# Patient Record
Sex: FEMALE | Race: WHITE | NOT HISPANIC OR LATINO | Employment: OTHER | URBAN - METROPOLITAN AREA
[De-identification: names, ages, dates, MRNs, and addresses within clinical notes are randomized per-mention and may not be internally consistent; named-entity substitution may affect disease eponyms.]

---

## 2018-02-21 ENCOUNTER — OFFICE VISIT CONVERTED (OUTPATIENT)
Dept: FAMILY MEDICINE CLINIC | Facility: CLINIC | Age: 68
End: 2018-02-21
Attending: NURSE PRACTITIONER

## 2018-03-07 ENCOUNTER — CONVERSION ENCOUNTER (OUTPATIENT)
Dept: MAMMOGRAPHY | Facility: HOSPITAL | Age: 68
End: 2018-03-07

## 2018-05-11 ENCOUNTER — OFFICE VISIT CONVERTED (OUTPATIENT)
Dept: FAMILY MEDICINE CLINIC | Facility: CLINIC | Age: 68
End: 2018-05-11
Attending: NURSE PRACTITIONER

## 2018-08-10 ENCOUNTER — OFFICE VISIT CONVERTED (OUTPATIENT)
Dept: FAMILY MEDICINE CLINIC | Facility: CLINIC | Age: 68
End: 2018-08-10
Attending: NURSE PRACTITIONER

## 2018-08-10 ENCOUNTER — CONVERSION ENCOUNTER (OUTPATIENT)
Dept: FAMILY MEDICINE CLINIC | Facility: CLINIC | Age: 68
End: 2018-08-10

## 2018-08-17 ENCOUNTER — OFFICE VISIT CONVERTED (OUTPATIENT)
Dept: SURGERY | Facility: CLINIC | Age: 68
End: 2018-08-17
Attending: SURGERY

## 2018-08-22 ENCOUNTER — OFFICE VISIT CONVERTED (OUTPATIENT)
Dept: FAMILY MEDICINE CLINIC | Facility: CLINIC | Age: 68
End: 2018-08-22
Attending: NURSE PRACTITIONER

## 2018-10-09 ENCOUNTER — OFFICE VISIT CONVERTED (OUTPATIENT)
Dept: SURGERY | Facility: CLINIC | Age: 68
End: 2018-10-09
Attending: SURGERY

## 2018-11-13 ENCOUNTER — OFFICE VISIT CONVERTED (OUTPATIENT)
Dept: FAMILY MEDICINE CLINIC | Facility: CLINIC | Age: 68
End: 2018-11-13
Attending: NURSE PRACTITIONER

## 2018-11-27 ENCOUNTER — OFFICE VISIT CONVERTED (OUTPATIENT)
Dept: FAMILY MEDICINE CLINIC | Facility: CLINIC | Age: 68
End: 2018-11-27
Attending: NURSE PRACTITIONER

## 2018-11-30 ENCOUNTER — OFFICE VISIT CONVERTED (OUTPATIENT)
Dept: SURGERY | Facility: CLINIC | Age: 68
End: 2018-11-30
Attending: SURGERY

## 2019-01-17 ENCOUNTER — OFFICE VISIT CONVERTED (OUTPATIENT)
Dept: FAMILY MEDICINE CLINIC | Facility: CLINIC | Age: 69
End: 2019-01-17
Attending: NURSE PRACTITIONER

## 2019-01-17 ENCOUNTER — HOSPITAL ENCOUNTER (OUTPATIENT)
Dept: FAMILY MEDICINE CLINIC | Facility: CLINIC | Age: 69
Discharge: HOME | End: 2019-01-17
Attending: NURSE PRACTITIONER

## 2019-01-17 LAB
ALBUMIN SERPL-MCNC: 4.7 G/DL (ref 3.5–5)
ALBUMIN/GLOB SERPL: 1.6 {RATIO} (ref 1.4–2.6)
ALP SERPL-CCNC: 116 U/L (ref 43–160)
ALT SERPL-CCNC: 43 U/L (ref 10–40)
ANION GAP SERPL CALC-SCNC: 26 MMOL/L (ref 8–19)
APPEARANCE UR: CLEAR
AST SERPL-CCNC: 52 U/L (ref 15–50)
BASOPHILS # BLD AUTO: 0.09 10*3/UL (ref 0–0.2)
BASOPHILS NFR BLD AUTO: 0.75 % (ref 0–3)
BILIRUB SERPL-MCNC: 0.47 MG/DL (ref 0.2–1.3)
BILIRUB UR QL: NEGATIVE
BUN SERPL-MCNC: 21 MG/DL (ref 5–25)
BUN/CREAT SERPL: 19 {RATIO} (ref 6–20)
CALCIUM SERPL-MCNC: 9.8 MG/DL (ref 8.7–10.4)
CHLORIDE SERPL-SCNC: 101 MMOL/L (ref 99–111)
CHOLEST SERPL-MCNC: 162 MG/DL (ref 107–200)
CHOLEST/HDLC SERPL: 4.1 {RATIO} (ref 3–6)
COLOR UR: YELLOW
CONV CO2: 20 MMOL/L (ref 22–32)
CONV COLLECTION SOURCE (UA): NORMAL
CONV TOTAL PROTEIN: 7.7 G/DL (ref 6.3–8.2)
CONV UROBILINOGEN IN URINE BY AUTOMATED TEST STRIP: 0.2 {EHRLICHU}/DL (ref 0.1–1)
CREAT UR-MCNC: 1.08 MG/DL (ref 0.5–0.9)
EOSINOPHIL # BLD AUTO: 0.44 10*3/UL (ref 0–0.7)
EOSINOPHIL # BLD AUTO: 3.65 % (ref 0–7)
ERYTHROCYTE [DISTWIDTH] IN BLOOD BY AUTOMATED COUNT: 13.2 % (ref 11.5–14.5)
EST. AVERAGE GLUCOSE BLD GHB EST-MCNC: 154 MG/DL
GFR SERPLBLD BASED ON 1.73 SQ M-ARVRAT: 52 ML/MIN/{1.73_M2}
GLOBULIN UR ELPH-MCNC: 3 G/DL (ref 2–3.5)
GLUCOSE SERPL-MCNC: 158 MG/DL (ref 65–99)
GLUCOSE UR QL: NEGATIVE MG/DL
HBA1C MFR BLD: 12.7 G/DL (ref 12–16)
HBA1C MFR BLD: 7 % (ref 3.5–5.7)
HCT VFR BLD AUTO: 36.6 % (ref 37–47)
HDLC SERPL-MCNC: 40 MG/DL (ref 40–60)
HGB UR QL STRIP: NEGATIVE
KETONES UR QL STRIP: NEGATIVE MG/DL
LDLC SERPL CALC-MCNC: 75 MG/DL (ref 70–100)
LEUKOCYTE ESTERASE UR QL STRIP: NEGATIVE
LYMPHOCYTES # BLD AUTO: 2.66 10*3/UL (ref 1–5)
MCH RBC QN AUTO: 29.6 PG (ref 27–31)
MCHC RBC AUTO-ENTMCNC: 34.8 G/DL (ref 33–37)
MCV RBC AUTO: 85.1 FL (ref 81–99)
MONOCYTES # BLD AUTO: 0.62 10*3/UL (ref 0.2–1.2)
MONOCYTES NFR BLD AUTO: 5.2 % (ref 3–10)
NEUTROPHILS # BLD AUTO: 8.14 10*3/UL (ref 2–8)
NEUTROPHILS NFR BLD AUTO: 68.1 % (ref 30–85)
NITRITE UR QL STRIP: NEGATIVE
NRBC BLD AUTO-RTO: 0 % (ref 0–0.01)
OSMOLALITY SERPL CALC.SUM OF ELEC: 300 MOSM/KG (ref 273–304)
PH UR STRIP.AUTO: 6 [PH] (ref 5–8)
PLATELET # BLD AUTO: 326 10*3/UL (ref 130–400)
PMV BLD AUTO: 7.2 FL (ref 7.4–10.4)
POTASSIUM SERPL-SCNC: 4.8 MMOL/L (ref 3.5–5.3)
PROT UR QL: NEGATIVE MG/DL
RBC # BLD AUTO: 4.3 10*6/UL (ref 4.2–5.4)
SODIUM SERPL-SCNC: 142 MMOL/L (ref 135–147)
SP GR UR: 1.01 (ref 1–1.03)
TRIGL SERPL-MCNC: 234 MG/DL (ref 40–150)
VARIANT LYMPHS NFR BLD MANUAL: 22.3 % (ref 20–45)
VLDLC SERPL-MCNC: 47 MG/DL (ref 5–37)
WBC # BLD AUTO: 11.9 10*3/UL (ref 4.8–10.8)

## 2019-07-25 ENCOUNTER — HOSPITAL ENCOUNTER (OUTPATIENT)
Dept: GENERAL RADIOLOGY | Facility: HOSPITAL | Age: 69
Discharge: HOME | End: 2019-07-25
Attending: PHYSICIAN ASSISTANT

## 2020-01-13 ENCOUNTER — HOSPITAL ENCOUNTER (OUTPATIENT)
Dept: ULTRASOUND IMAGING | Facility: HOSPITAL | Age: 70
Discharge: HOME | End: 2020-01-13
Attending: PHYSICIAN ASSISTANT

## 2020-03-10 ENCOUNTER — OFFICE VISIT CONVERTED (OUTPATIENT)
Dept: ONCOLOGY | Facility: HOSPITAL | Age: 70
End: 2020-03-10
Attending: INTERNAL MEDICINE

## 2020-03-10 ENCOUNTER — HOSPITAL ENCOUNTER (OUTPATIENT)
Dept: ONCOLOGY | Facility: HOSPITAL | Age: 70
Discharge: HOME | End: 2020-03-10
Attending: INTERNAL MEDICINE

## 2020-04-02 ENCOUNTER — OFFICE VISIT CONVERTED (OUTPATIENT)
Dept: ONCOLOGY | Facility: HOSPITAL | Age: 70
End: 2020-04-02
Attending: INTERNAL MEDICINE

## 2020-04-02 ENCOUNTER — HOSPITAL ENCOUNTER (OUTPATIENT)
Dept: ONCOLOGY | Facility: HOSPITAL | Age: 70
Discharge: HOME | End: 2020-04-02
Attending: INTERNAL MEDICINE

## 2020-04-02 LAB
ALBUMIN SERPL-MCNC: 3.9 G/DL (ref 3.5–5)
ALBUMIN/GLOB SERPL: 1.1 {RATIO} (ref 1.4–2.6)
ALP SERPL-CCNC: 134 U/L (ref 43–160)
ALT SERPL-CCNC: 22 U/L (ref 10–40)
AMYLASE SERPL-CCNC: 60 U/L (ref 30–150)
ANION GAP SERPL CALC-SCNC: 15 MMOL/L (ref 8–19)
AST SERPL-CCNC: 27 U/L (ref 15–50)
BASOPHILS # BLD AUTO: 0.05 10*3/UL (ref 0–0.2)
BASOPHILS NFR BLD AUTO: 0.6 % (ref 0–3)
BILIRUB SERPL-MCNC: 0.36 MG/DL (ref 0.2–1.3)
BUN SERPL-MCNC: 21 MG/DL (ref 5–25)
BUN/CREAT SERPL: 34 {RATIO} (ref 6–20)
CALCIUM SERPL-MCNC: 9.9 MG/DL (ref 8.7–10.4)
CALCIUM SPEC-SCNC: 9.8 MG/DL (ref 8.7–10.4)
CHLORIDE SERPL-SCNC: 97 MMOL/L (ref 99–111)
CONV ABS IMM GRAN: 0.02 10*3/UL (ref 0–0.54)
CONV CO2: 23 MMOL/L (ref 22–32)
CONV EOSINOPHILS PERCENT BY MANUAL COUNT: 2.5 % (ref 0–7)
CONV IMMATURE GRAN: 0.2 % (ref 0–0.4)
CONV TOTAL PROTEIN: 7.4 G/DL (ref 6.3–8.2)
CREAT UR-MCNC: 0.61 MG/DL (ref 0.5–0.9)
EOSINOPHIL # BLD MANUAL: 0.21 10*3/UL (ref 0–0.7)
ERYTHROCYTE [DISTWIDTH] IN BLOOD BY AUTOMATED COUNT: 15.5 % (ref 11.5–14.5)
ERYTHROCYTE [DISTWIDTH] IN BLOOD BY AUTOMATED COUNT: 46.5 FL
GFR SERPLBLD BASED ON 1.73 SQ M-ARVRAT: >60 ML/MIN/{1.73_M2}
GLOBULIN UR ELPH-MCNC: 3.5 G/DL (ref 2–3.5)
GLUCOSE SERPL-MCNC: 244 MG/DL (ref 65–99)
HBA1C MFR BLD: 10 G/DL (ref 12–16)
HCT VFR BLD AUTO: 31.7 % (ref 37–47)
LIPASE SERPL-CCNC: 190 U/L (ref 5–51)
LYMPHOCYTES # BLD AUTO: 1.52 10*3/UL (ref 1–5)
LYMPHOCYTES NFR BLD AUTO: 17.9 % (ref 20–45)
MCH RBC QN AUTO: 25.4 PG (ref 27–31)
MCHC RBC AUTO-ENTMCNC: 31.5 G/DL (ref 33–37)
MCV RBC AUTO: 80.7 FL (ref 81–99)
MONOCYTES # BLD AUTO: 0.45 10*3/UL (ref 0.2–1.2)
MONOCYTES NFR BLD MANUAL: 5.3 % (ref 3–10)
NEUTROPHILS # BLD AUTO: 6.26 10*3/UL (ref 2–8)
NEUTROPHILS NFR BLD MANUAL: 73.5 % (ref 30–85)
OSMOLALITY SERPL CALC.SUM OF ELEC: 283 MOSM/KG (ref 273–304)
PLATELET # BLD AUTO: 341 10*3/UL (ref 130–400)
PMV BLD AUTO: 9.3 FL (ref 7.4–10.4)
POTASSIUM SERPL-SCNC: 4.1 MMOL/L (ref 3.5–5.3)
PREALB SERPL-MCNC: 24.3 MG/DL (ref 20–40)
RBC MORPH BLD: 3.93 10*6/UL (ref 4.2–5.4)
SODIUM SERPL-SCNC: 131 MMOL/L (ref 135–147)
WBC # BLD AUTO: 8.51 10*3/UL (ref 4.8–10.8)

## 2020-05-14 ENCOUNTER — OFFICE VISIT CONVERTED (OUTPATIENT)
Dept: ONCOLOGY | Facility: HOSPITAL | Age: 70
End: 2020-05-14
Attending: INTERNAL MEDICINE

## 2020-05-14 ENCOUNTER — HOSPITAL ENCOUNTER (OUTPATIENT)
Dept: OTHER | Facility: HOSPITAL | Age: 70
Discharge: HOME | End: 2020-05-14
Attending: INTERNAL MEDICINE

## 2020-05-14 LAB
ALBUMIN SERPL-MCNC: 4 G/DL (ref 3.5–5)
ALBUMIN/GLOB SERPL: 1.3 {RATIO} (ref 1.4–2.6)
ALP SERPL-CCNC: 114 U/L (ref 43–160)
ALT SERPL-CCNC: 18 U/L (ref 10–40)
ANION GAP SERPL CALC-SCNC: 18 MMOL/L (ref 8–19)
AST SERPL-CCNC: 19 U/L (ref 15–50)
BASOPHILS # BLD AUTO: 0.04 10*3/UL (ref 0–0.2)
BASOPHILS NFR BLD AUTO: 0.4 % (ref 0–3)
BILIRUB SERPL-MCNC: 0.23 MG/DL (ref 0.2–1.3)
BUN SERPL-MCNC: 11 MG/DL (ref 5–25)
BUN/CREAT SERPL: 21 {RATIO} (ref 6–20)
CALCIUM SERPL-MCNC: 9.1 MG/DL (ref 8.7–10.4)
CHLORIDE SERPL-SCNC: 94 MMOL/L (ref 99–111)
CONV ABS IMM GRAN: 0.04 10*3/UL (ref 0–0.2)
CONV CO2: 23 MMOL/L (ref 22–32)
CONV IMMATURE GRAN: 0.4 % (ref 0–1.8)
CONV TOTAL PROTEIN: 7.2 G/DL (ref 6.3–8.2)
CREAT UR-MCNC: 0.53 MG/DL (ref 0.5–0.9)
DEPRECATED RDW RBC AUTO: 42.6 FL (ref 36.4–46.3)
EOSINOPHIL # BLD AUTO: 0.18 10*3/UL (ref 0–0.7)
EOSINOPHIL # BLD AUTO: 1.8 % (ref 0–7)
ERYTHROCYTE [DISTWIDTH] IN BLOOD BY AUTOMATED COUNT: 14.9 % (ref 11.7–14.4)
GFR SERPLBLD BASED ON 1.73 SQ M-ARVRAT: >60 ML/MIN/{1.73_M2}
GLOBULIN UR ELPH-MCNC: 3.2 G/DL (ref 2–3.5)
GLUCOSE SERPL-MCNC: 188 MG/DL (ref 65–99)
HCT VFR BLD AUTO: 29.4 % (ref 37–47)
HGB BLD-MCNC: 9.2 G/DL (ref 12–16)
LYMPHOCYTES # BLD AUTO: 1.43 10*3/UL (ref 1–5)
LYMPHOCYTES NFR BLD AUTO: 14.4 % (ref 20–45)
MAGNESIUM SERPL-MCNC: 1.68 MG/DL (ref 1.6–2.3)
MCH RBC QN AUTO: 24.7 PG (ref 27–31)
MCHC RBC AUTO-ENTMCNC: 31.3 G/DL (ref 33–37)
MCV RBC AUTO: 78.8 FL (ref 81–99)
MONOCYTES # BLD AUTO: 0.54 10*3/UL (ref 0.2–1.2)
MONOCYTES NFR BLD AUTO: 5.5 % (ref 3–10)
NEUTROPHILS # BLD AUTO: 7.67 10*3/UL (ref 2–8)
NEUTROPHILS NFR BLD AUTO: 77.5 % (ref 30–85)
NRBC CBCN: 0 % (ref 0–0.7)
OSMOLALITY SERPL CALC.SUM OF ELEC: 278 MOSM/KG (ref 273–304)
PHOSPHATE SERPL-MCNC: 3.4 MG/DL (ref 2.4–4.5)
PLATELET # BLD AUTO: 241 10*3/UL (ref 130–400)
PMV BLD AUTO: 10.3 FL (ref 9.4–12.3)
POTASSIUM SERPL-SCNC: 3.1 MMOL/L (ref 3.5–5.3)
RBC # BLD AUTO: 3.73 10*6/UL (ref 4.2–5.4)
SODIUM SERPL-SCNC: 132 MMOL/L (ref 135–147)
WBC # BLD AUTO: 9.9 10*3/UL (ref 4.8–10.8)

## 2020-05-21 ENCOUNTER — OFFICE VISIT CONVERTED (OUTPATIENT)
Dept: ONCOLOGY | Facility: HOSPITAL | Age: 70
End: 2020-05-21
Attending: NURSE PRACTITIONER

## 2020-05-22 ENCOUNTER — OFFICE VISIT CONVERTED (OUTPATIENT)
Dept: ONCOLOGY | Facility: HOSPITAL | Age: 70
End: 2020-05-22
Attending: INTERNAL MEDICINE

## 2020-05-22 ENCOUNTER — HOSPITAL ENCOUNTER (OUTPATIENT)
Dept: OTHER | Facility: HOSPITAL | Age: 70
Setting detail: RECURRING SERIES
Discharge: HOME | End: 2020-08-20
Attending: INTERNAL MEDICINE

## 2020-05-22 LAB
ALBUMIN SERPL-MCNC: 4 G/DL (ref 3.5–5)
ALBUMIN/GLOB SERPL: 1.3 {RATIO} (ref 1.4–2.6)
ALP SERPL-CCNC: 124 U/L (ref 43–160)
ALT SERPL-CCNC: 25 U/L (ref 10–40)
ANION GAP SERPL CALC-SCNC: 16 MMOL/L (ref 8–19)
AST SERPL-CCNC: 38 U/L (ref 15–50)
BASOPHILS # BLD AUTO: 0.01 10*3/UL (ref 0–0.2)
BASOPHILS NFR BLD AUTO: 0.1 % (ref 0–3)
BILIRUB SERPL-MCNC: 0.27 MG/DL (ref 0.2–1.3)
BUN SERPL-MCNC: 13 MG/DL (ref 5–25)
BUN/CREAT SERPL: 20 {RATIO} (ref 6–20)
CALCIUM SERPL-MCNC: 9.3 MG/DL (ref 8.7–10.4)
CALCIUM SPEC-SCNC: 9.3 MG/DL (ref 8.7–10.4)
CHLORIDE SERPL-SCNC: 93 MMOL/L (ref 99–111)
CONV ABS IMM GRAN: 0.01 10*3/UL (ref 0–0.54)
CONV CO2: 24 MMOL/L (ref 22–32)
CONV EOSINOPHILS PERCENT BY MANUAL COUNT: 1.8 % (ref 0–7)
CONV IMMATURE GRAN: 0.1 % (ref 0–0.4)
CONV TOTAL PROTEIN: 7 G/DL (ref 6.3–8.2)
CREAT UR-MCNC: 0.64 MG/DL (ref 0.5–0.9)
EOSINOPHIL # BLD MANUAL: 0.14 10*3/UL (ref 0–0.7)
ERYTHROCYTE [DISTWIDTH] IN BLOOD BY AUTOMATED COUNT: 14.3 % (ref 11.5–14.5)
ERYTHROCYTE [DISTWIDTH] IN BLOOD BY AUTOMATED COUNT: 40.4 FL
FERRITIN SERPL-MCNC: 23 NG/ML (ref 10–200)
GFR SERPLBLD BASED ON 1.73 SQ M-ARVRAT: >60 ML/MIN/{1.73_M2}
GLOBULIN UR ELPH-MCNC: 3 G/DL (ref 2–3.5)
GLUCOSE SERPL-MCNC: 248 MG/DL (ref 65–99)
HBA1C MFR BLD: 9.3 G/DL (ref 12–16)
HCT VFR BLD AUTO: 28.7 % (ref 37–47)
IRON SATN MFR SERPL: 5 % (ref 20–55)
IRON SERPL-MCNC: 27 UG/DL (ref 60–170)
LYMPHOCYTES # BLD AUTO: 0.95 10*3/UL (ref 1–5)
LYMPHOCYTES NFR BLD AUTO: 12.3 % (ref 20–45)
MCH RBC QN AUTO: 24.9 PG (ref 27–31)
MCHC RBC AUTO-ENTMCNC: 32.4 G/DL (ref 33–37)
MCV RBC AUTO: 76.9 FL (ref 81–99)
MONOCYTES # BLD AUTO: 0.48 10*3/UL (ref 0.2–1.2)
MONOCYTES NFR BLD MANUAL: 6.2 % (ref 3–10)
NEUTROPHILS # BLD AUTO: 6.14 10*3/UL (ref 2–8)
NEUTROPHILS NFR BLD MANUAL: 79.5 % (ref 30–85)
OSMOLALITY SERPL CALC.SUM OF ELEC: 276 MOSM/KG (ref 273–304)
PLATELET # BLD AUTO: 233 10*3/UL (ref 130–400)
PMV BLD AUTO: 9.4 FL (ref 7.4–10.4)
POTASSIUM SERPL-SCNC: 3.6 MMOL/L (ref 3.5–5.3)
RBC MORPH BLD: 3.73 10*6/UL (ref 4.2–5.4)
SODIUM SERPL-SCNC: 129 MMOL/L (ref 135–147)
TIBC SERPL-MCNC: 502 UG/DL (ref 245–450)
TRANSFERRIN SERPL-MCNC: 351 MG/DL (ref 250–380)
WBC # BLD AUTO: 7.73 10*3/UL (ref 4.8–10.8)

## 2020-05-29 ENCOUNTER — OFFICE VISIT CONVERTED (OUTPATIENT)
Dept: ONCOLOGY | Facility: HOSPITAL | Age: 70
End: 2020-05-29
Attending: INTERNAL MEDICINE

## 2020-05-29 LAB
ALBUMIN SERPL-MCNC: 4 G/DL (ref 3.5–5)
ALBUMIN/GLOB SERPL: 1.3 {RATIO} (ref 1.4–2.6)
ALP SERPL-CCNC: 109 U/L (ref 43–160)
ALT SERPL-CCNC: 27 U/L (ref 10–40)
ANION GAP SERPL CALC-SCNC: 15 MMOL/L (ref 8–19)
AST SERPL-CCNC: 26 U/L (ref 15–50)
BASOPHILS # BLD AUTO: 0.02 10*3/UL (ref 0–0.2)
BASOPHILS NFR BLD AUTO: 0.7 % (ref 0–3)
BILIRUB SERPL-MCNC: 0.22 MG/DL (ref 0.2–1.3)
BUN SERPL-MCNC: 22 MG/DL (ref 5–25)
BUN/CREAT SERPL: 31 {RATIO} (ref 6–20)
CALCIUM SERPL-MCNC: 9.7 MG/DL (ref 8.7–10.4)
CALCIUM SPEC-SCNC: 9.7 MG/DL (ref 8.7–10.4)
CHLORIDE SERPL-SCNC: 95 MMOL/L (ref 99–111)
CONV ABS IMM GRAN: 0.01 10*3/UL (ref 0–0.54)
CONV CO2: 26 MMOL/L (ref 22–32)
CONV EOSINOPHILS PERCENT BY MANUAL COUNT: 2.8 % (ref 0–7)
CONV IMMATURE GRAN: 0.4 % (ref 0–0.4)
CONV TOTAL PROTEIN: 7 G/DL (ref 6.3–8.2)
CREAT UR-MCNC: 0.72 MG/DL (ref 0.5–0.9)
EOSINOPHIL # BLD MANUAL: 0.08 10*3/UL (ref 0–0.7)
ERYTHROCYTE [DISTWIDTH] IN BLOOD BY AUTOMATED COUNT: 14.1 % (ref 11.5–14.5)
ERYTHROCYTE [DISTWIDTH] IN BLOOD BY AUTOMATED COUNT: 40.3 FL
GFR SERPLBLD BASED ON 1.73 SQ M-ARVRAT: >60 ML/MIN/{1.73_M2}
GLOBULIN UR ELPH-MCNC: 3 G/DL (ref 2–3.5)
GLUCOSE SERPL-MCNC: 232 MG/DL (ref 65–99)
HBA1C MFR BLD: 8.8 G/DL (ref 12–16)
HCT VFR BLD AUTO: 27.7 % (ref 37–47)
LYMPHOCYTES # BLD AUTO: 0.98 10*3/UL (ref 1–5)
LYMPHOCYTES NFR BLD AUTO: 34.9 % (ref 20–45)
MCH RBC QN AUTO: 24.7 PG (ref 27–31)
MCHC RBC AUTO-ENTMCNC: 31.8 G/DL (ref 33–37)
MCV RBC AUTO: 77.8 FL (ref 81–99)
MONOCYTES # BLD AUTO: 0.33 10*3/UL (ref 0.2–1.2)
MONOCYTES NFR BLD MANUAL: 11.7 % (ref 3–10)
NEUTROPHILS # BLD AUTO: 1.39 10*3/UL (ref 2–8)
NEUTROPHILS NFR BLD MANUAL: 49.5 % (ref 30–85)
OSMOLALITY SERPL CALC.SUM OF ELEC: 285 MOSM/KG (ref 273–304)
PATHOLOGY REVIEW: NORMAL
PLATELET # BLD AUTO: 157 10*3/UL (ref 130–400)
PMV BLD AUTO: 9.2 FL (ref 7.4–10.4)
POTASSIUM SERPL-SCNC: 4.2 MMOL/L (ref 3.5–5.3)
RBC MORPH BLD: 3.56 10*6/UL (ref 4.2–5.4)
SODIUM SERPL-SCNC: 132 MMOL/L (ref 135–147)
WBC # BLD AUTO: 2.81 10*3/UL (ref 4.8–10.8)

## 2020-06-15 ENCOUNTER — OFFICE VISIT CONVERTED (OUTPATIENT)
Dept: ONCOLOGY | Facility: HOSPITAL | Age: 70
End: 2020-06-15
Attending: INTERNAL MEDICINE

## 2020-06-15 LAB
ALBUMIN SERPL-MCNC: 4.1 G/DL (ref 3.5–5)
ALBUMIN/GLOB SERPL: 1.6 {RATIO} (ref 1.4–2.6)
ALP SERPL-CCNC: 103 U/L (ref 43–160)
ALT SERPL-CCNC: 25 U/L (ref 10–40)
ANION GAP SERPL CALC-SCNC: 12 MMOL/L (ref 8–19)
AST SERPL-CCNC: 30 U/L (ref 15–50)
BASOPHILS # BLD AUTO: 0.04 10*3/UL (ref 0–0.2)
BASOPHILS NFR BLD AUTO: 0.6 % (ref 0–3)
BILIRUB SERPL-MCNC: 0.22 MG/DL (ref 0.2–1.3)
BUN SERPL-MCNC: 15 MG/DL (ref 5–25)
BUN/CREAT SERPL: 22 {RATIO} (ref 6–20)
CALCIUM SERPL-MCNC: 9.3 MG/DL (ref 8.7–10.4)
CALCIUM SPEC-SCNC: 9.3 MG/DL (ref 8.7–10.4)
CHLORIDE SERPL-SCNC: 100 MMOL/L (ref 99–111)
CONV ABS IMM GRAN: 0.04 10*3/UL (ref 0–0.54)
CONV CO2: 25 MMOL/L (ref 22–32)
CONV EOSINOPHILS PERCENT BY MANUAL COUNT: 3 % (ref 0–7)
CONV IMMATURE GRAN: 0.6 % (ref 0–0.4)
CONV TOTAL PROTEIN: 6.7 G/DL (ref 6.3–8.2)
CREAT UR-MCNC: 0.69 MG/DL (ref 0.5–0.9)
EOSINOPHIL # BLD MANUAL: 0.21 10*3/UL (ref 0–0.7)
ERYTHROCYTE [DISTWIDTH] IN BLOOD BY AUTOMATED COUNT: 18.5 % (ref 11.5–14.5)
ERYTHROCYTE [DISTWIDTH] IN BLOOD BY AUTOMATED COUNT: 51 FL
GFR SERPLBLD BASED ON 1.73 SQ M-ARVRAT: >60 ML/MIN/{1.73_M2}
GLOBULIN UR ELPH-MCNC: 2.6 G/DL (ref 2–3.5)
GLUCOSE SERPL-MCNC: 170 MG/DL (ref 65–99)
HBA1C MFR BLD: 9.7 G/DL (ref 12–16)
HCT VFR BLD AUTO: 30 % (ref 37–47)
LYMPHOCYTES # BLD AUTO: 1.35 10*3/UL (ref 1–5)
LYMPHOCYTES NFR BLD AUTO: 19.5 % (ref 20–45)
MCH RBC QN AUTO: 26.1 PG (ref 27–31)
MCHC RBC AUTO-ENTMCNC: 32.3 G/DL (ref 33–37)
MCV RBC AUTO: 80.9 FL (ref 81–99)
MONOCYTES # BLD AUTO: 0.52 10*3/UL (ref 0.2–1.2)
MONOCYTES NFR BLD MANUAL: 7.5 % (ref 3–10)
NEUTROPHILS # BLD AUTO: 4.76 10*3/UL (ref 2–8)
NEUTROPHILS NFR BLD MANUAL: 68.8 % (ref 30–85)
OSMOLALITY SERPL CALC.SUM OF ELEC: 281 MOSM/KG (ref 273–304)
PLATELET # BLD AUTO: 366 10*3/UL (ref 130–400)
PMV BLD AUTO: 9 FL (ref 7.4–10.4)
POTASSIUM SERPL-SCNC: 4.4 MMOL/L (ref 3.5–5.3)
RBC MORPH BLD: 3.71 10*6/UL (ref 4.2–5.4)
SODIUM SERPL-SCNC: 133 MMOL/L (ref 135–147)
WBC # BLD AUTO: 6.92 10*3/UL (ref 4.8–10.8)

## 2020-06-24 ENCOUNTER — HOSPITAL ENCOUNTER (OUTPATIENT)
Dept: OTHER | Facility: HOSPITAL | Age: 70
Discharge: HOME | End: 2020-06-24
Attending: INTERNAL MEDICINE

## 2020-06-24 LAB
ALBUMIN SERPL-MCNC: 4.7 G/DL (ref 3.5–5)
ALBUMIN/GLOB SERPL: 1.7 {RATIO} (ref 1.4–2.6)
ALP SERPL-CCNC: 119 U/L (ref 43–160)
ALT SERPL-CCNC: 37 U/L (ref 10–40)
AMYLASE SERPL-CCNC: 39 U/L (ref 30–150)
ANION GAP SERPL CALC-SCNC: 17 MMOL/L (ref 8–19)
AST SERPL-CCNC: 38 U/L (ref 15–50)
BASOPHILS # BLD AUTO: 0.04 10*3/UL (ref 0–0.2)
BASOPHILS NFR BLD AUTO: 0.5 % (ref 0–3)
BILIRUB SERPL-MCNC: 0.24 MG/DL (ref 0.2–1.3)
BUN SERPL-MCNC: 19 MG/DL (ref 5–25)
BUN/CREAT SERPL: 19 {RATIO} (ref 6–20)
CALCIUM SERPL-MCNC: 9.5 MG/DL (ref 8.7–10.4)
CHLORIDE SERPL-SCNC: 97 MMOL/L (ref 99–111)
CONV ABS IMM GRAN: 0.08 10*3/UL (ref 0–0.2)
CONV CO2: 22 MMOL/L (ref 22–32)
CONV IMMATURE GRAN: 1 % (ref 0–1.8)
CONV TOTAL PROTEIN: 7.5 G/DL (ref 6.3–8.2)
CREAT UR-MCNC: 0.98 MG/DL (ref 0.5–0.9)
DEPRECATED RDW RBC AUTO: 53.2 FL (ref 36.4–46.3)
EOSINOPHIL # BLD AUTO: 0.22 10*3/UL (ref 0–0.7)
EOSINOPHIL # BLD AUTO: 2.7 % (ref 0–7)
ERYTHROCYTE [DISTWIDTH] IN BLOOD BY AUTOMATED COUNT: 20.2 % (ref 11.7–14.4)
GFR SERPLBLD BASED ON 1.73 SQ M-ARVRAT: 58 ML/MIN/{1.73_M2}
GLOBULIN UR ELPH-MCNC: 2.8 G/DL (ref 2–3.5)
GLUCOSE SERPL-MCNC: 130 MG/DL (ref 65–99)
HCT VFR BLD AUTO: 34 % (ref 37–47)
HGB BLD-MCNC: 10.7 G/DL (ref 12–16)
LIPASE SERPL-CCNC: 25 U/L (ref 5–51)
LYMPHOCYTES # BLD AUTO: 1.78 10*3/UL (ref 1–5)
LYMPHOCYTES NFR BLD AUTO: 22.1 % (ref 20–45)
MCH RBC QN AUTO: 26 PG (ref 27–31)
MCHC RBC AUTO-ENTMCNC: 31.5 G/DL (ref 33–37)
MCV RBC AUTO: 82.5 FL (ref 81–99)
MONOCYTES # BLD AUTO: 0.69 10*3/UL (ref 0.2–1.2)
MONOCYTES NFR BLD AUTO: 8.6 % (ref 3–10)
NEUTROPHILS # BLD AUTO: 5.26 10*3/UL (ref 2–8)
NEUTROPHILS NFR BLD AUTO: 65.1 % (ref 30–85)
NRBC CBCN: 0 % (ref 0–0.7)
OSMOLALITY SERPL CALC.SUM OF ELEC: 278 MOSM/KG (ref 273–304)
PLATELET # BLD AUTO: 172 10*3/UL (ref 130–400)
PMV BLD AUTO: 9.6 FL (ref 9.4–12.3)
POTASSIUM SERPL-SCNC: 4 MMOL/L (ref 3.5–5.3)
RBC # BLD AUTO: 4.12 10*6/UL (ref 4.2–5.4)
SODIUM SERPL-SCNC: 132 MMOL/L (ref 135–147)
WBC # BLD AUTO: 8.07 10*3/UL (ref 4.8–10.8)

## 2020-06-29 ENCOUNTER — OFFICE VISIT CONVERTED (OUTPATIENT)
Dept: ONCOLOGY | Facility: HOSPITAL | Age: 70
End: 2020-06-29
Attending: INTERNAL MEDICINE

## 2020-06-29 LAB
ALBUMIN SERPL-MCNC: 4 G/DL (ref 3.5–5)
ALBUMIN/GLOB SERPL: 1.5 {RATIO} (ref 1.4–2.6)
ALP SERPL-CCNC: 105 U/L (ref 43–160)
ALT SERPL-CCNC: 28 U/L (ref 10–40)
ANION GAP SERPL CALC-SCNC: 13 MMOL/L (ref 8–19)
AST SERPL-CCNC: 31 U/L (ref 15–50)
BASOPHILS # BLD AUTO: 0.02 10*3/UL (ref 0–0.2)
BASOPHILS NFR BLD AUTO: 0.3 % (ref 0–3)
BILIRUB SERPL-MCNC: 0.29 MG/DL (ref 0.2–1.3)
BUN SERPL-MCNC: 14 MG/DL (ref 5–25)
BUN/CREAT SERPL: 17 {RATIO} (ref 6–20)
CALCIUM SERPL-MCNC: 9.1 MG/DL (ref 8.7–10.4)
CALCIUM SPEC-SCNC: 9.1 MG/DL (ref 8.7–10.4)
CHLORIDE SERPL-SCNC: 101 MMOL/L (ref 99–111)
CONV ABS IMM GRAN: 0.04 10*3/UL (ref 0–0.54)
CONV CO2: 23 MMOL/L (ref 22–32)
CONV EOSINOPHILS PERCENT BY MANUAL COUNT: 4.2 % (ref 0–7)
CONV IMMATURE GRAN: 0.5 % (ref 0–0.4)
CONV TOTAL PROTEIN: 6.6 G/DL (ref 6.3–8.2)
CREAT UR-MCNC: 0.82 MG/DL (ref 0.5–0.9)
EOSINOPHIL # BLD MANUAL: 0.31 10*3/UL (ref 0–0.7)
ERYTHROCYTE [DISTWIDTH] IN BLOOD BY AUTOMATED COUNT: 22.1 % (ref 11.5–14.5)
ERYTHROCYTE [DISTWIDTH] IN BLOOD BY AUTOMATED COUNT: 62.1 FL
GFR SERPLBLD BASED ON 1.73 SQ M-ARVRAT: >60 ML/MIN/{1.73_M2}
GLOBULIN UR ELPH-MCNC: 2.6 G/DL (ref 2–3.5)
GLUCOSE SERPL-MCNC: 183 MG/DL (ref 65–99)
HBA1C MFR BLD: 10.5 G/DL (ref 12–16)
HCT VFR BLD AUTO: 32.7 % (ref 37–47)
LYMPHOCYTES # BLD AUTO: 1.33 10*3/UL (ref 1–5)
LYMPHOCYTES NFR BLD AUTO: 18.1 % (ref 20–45)
MCH RBC QN AUTO: 26.9 PG (ref 27–31)
MCHC RBC AUTO-ENTMCNC: 32.1 G/DL (ref 33–37)
MCV RBC AUTO: 83.8 FL (ref 81–99)
MONOCYTES # BLD AUTO: 0.65 10*3/UL (ref 0.2–1.2)
MONOCYTES NFR BLD MANUAL: 8.9 % (ref 3–10)
NEUTROPHILS # BLD AUTO: 4.99 10*3/UL (ref 2–8)
NEUTROPHILS NFR BLD MANUAL: 68 % (ref 30–85)
OSMOLALITY SERPL CALC.SUM OF ELEC: 281 MOSM/KG (ref 273–304)
PLATELET # BLD AUTO: 140 10*3/UL (ref 130–400)
PMV BLD AUTO: 9.8 FL (ref 7.4–10.4)
POTASSIUM SERPL-SCNC: 4.3 MMOL/L (ref 3.5–5.3)
RBC MORPH BLD: 3.9 10*6/UL (ref 4.2–5.4)
SODIUM SERPL-SCNC: 133 MMOL/L (ref 135–147)
WBC # BLD AUTO: 7.34 10*3/UL (ref 4.8–10.8)

## 2020-07-13 ENCOUNTER — OFFICE VISIT CONVERTED (OUTPATIENT)
Dept: ONCOLOGY | Facility: HOSPITAL | Age: 70
End: 2020-07-13
Attending: INTERNAL MEDICINE

## 2020-07-13 LAB
ALBUMIN SERPL-MCNC: 4 G/DL (ref 3.5–5)
ALBUMIN/GLOB SERPL: 1.7 {RATIO} (ref 1.4–2.6)
ALP SERPL-CCNC: 116 U/L (ref 43–160)
ALT SERPL-CCNC: 30 U/L (ref 10–40)
ANION GAP SERPL CALC-SCNC: 15 MMOL/L (ref 8–19)
AST SERPL-CCNC: 33 U/L (ref 15–50)
BASOPHILS # BLD AUTO: 0.03 10*3/UL (ref 0–0.2)
BASOPHILS NFR BLD AUTO: 0.5 % (ref 0–3)
BILIRUB SERPL-MCNC: 0.3 MG/DL (ref 0.2–1.3)
BUN SERPL-MCNC: 17 MG/DL (ref 5–25)
BUN/CREAT SERPL: 19 {RATIO} (ref 6–20)
CALCIUM SERPL-MCNC: 9.1 MG/DL (ref 8.7–10.4)
CHLORIDE SERPL-SCNC: 103 MMOL/L (ref 99–111)
CONV ABS IMM GRAN: 0.02 10*3/UL (ref 0–0.54)
CONV CO2: 23 MMOL/L (ref 22–32)
CONV EOSINOPHILS PERCENT BY MANUAL COUNT: 6 % (ref 0–7)
CONV IMMATURE GRAN: 0.4 % (ref 0–0.4)
CONV TOTAL PROTEIN: 6.4 G/DL (ref 6.3–8.2)
CREAT UR-MCNC: 0.9 MG/DL (ref 0.5–0.9)
EOSINOPHIL # BLD MANUAL: 0.34 10*3/UL (ref 0–0.7)
ERYTHROCYTE [DISTWIDTH] IN BLOOD BY AUTOMATED COUNT: 24.2 % (ref 11.5–14.5)
ERYTHROCYTE [DISTWIDTH] IN BLOOD BY AUTOMATED COUNT: 72.7 FL
GFR SERPLBLD BASED ON 1.73 SQ M-ARVRAT: >60 ML/MIN/{1.73_M2}
GLOBULIN UR ELPH-MCNC: 2.4 G/DL (ref 2–3.5)
GLUCOSE SERPL-MCNC: 161 MG/DL (ref 65–99)
HBA1C MFR BLD: 10.6 G/DL (ref 12–16)
HCT VFR BLD AUTO: 32.3 % (ref 37–47)
LYMPHOCYTES # BLD AUTO: 0.87 10*3/UL (ref 1–5)
LYMPHOCYTES NFR BLD AUTO: 15.3 % (ref 20–45)
MCH RBC QN AUTO: 28.6 PG (ref 27–31)
MCHC RBC AUTO-ENTMCNC: 32.8 G/DL (ref 33–37)
MCV RBC AUTO: 87.1 FL (ref 81–99)
MONOCYTES # BLD AUTO: 0.54 10*3/UL (ref 0.2–1.2)
MONOCYTES NFR BLD MANUAL: 9.5 % (ref 3–10)
NEUTROPHILS # BLD AUTO: 3.9 10*3/UL (ref 2–8)
NEUTROPHILS NFR BLD MANUAL: 68.3 % (ref 30–85)
OSMOLALITY SERPL CALC.SUM OF ELEC: 287 MOSM/KG (ref 273–304)
PLATELET # BLD AUTO: 172 10*3/UL (ref 130–400)
PMV BLD AUTO: 9.6 FL (ref 7.4–10.4)
POTASSIUM SERPL-SCNC: 4.5 MMOL/L (ref 3.5–5.3)
RBC MORPH BLD: 3.71 10*6/UL (ref 4.2–5.4)
SODIUM SERPL-SCNC: 136 MMOL/L (ref 135–147)
WBC # BLD AUTO: 5.7 10*3/UL (ref 4.8–10.8)

## 2020-07-27 ENCOUNTER — OFFICE VISIT CONVERTED (OUTPATIENT)
Dept: ONCOLOGY | Facility: HOSPITAL | Age: 70
End: 2020-07-27
Attending: INTERNAL MEDICINE

## 2020-07-27 LAB
ALBUMIN SERPL-MCNC: 4 G/DL (ref 3.5–5)
ALBUMIN/GLOB SERPL: 1.7 {RATIO} (ref 1.4–2.6)
ALP SERPL-CCNC: 119 U/L (ref 43–160)
ALT SERPL-CCNC: 23 U/L (ref 10–40)
ANION GAP SERPL CALC-SCNC: 13 MMOL/L (ref 8–19)
AST SERPL-CCNC: 27 U/L (ref 15–50)
BASOPHILS # BLD AUTO: 0.02 10*3/UL (ref 0–0.2)
BASOPHILS NFR BLD AUTO: 0.3 % (ref 0–3)
BILIRUB SERPL-MCNC: 0.3 MG/DL (ref 0.2–1.3)
BUN SERPL-MCNC: 17 MG/DL (ref 5–25)
BUN/CREAT SERPL: 22 {RATIO} (ref 6–20)
CALCIUM SERPL-MCNC: 9.1 MG/DL (ref 8.7–10.4)
CHLORIDE SERPL-SCNC: 104 MMOL/L (ref 99–111)
CONV ABS IMM GRAN: 0.02 10*3/UL (ref 0–0.54)
CONV CO2: 24 MMOL/L (ref 22–32)
CONV EOSINOPHILS PERCENT BY MANUAL COUNT: 4 % (ref 0–7)
CONV IMMATURE GRAN: 0.3 % (ref 0–0.4)
CONV TOTAL PROTEIN: 6.3 G/DL (ref 6.3–8.2)
CREAT UR-MCNC: 0.79 MG/DL (ref 0.5–0.9)
EOSINOPHIL # BLD MANUAL: 0.25 10*3/UL (ref 0–0.7)
ERYTHROCYTE [DISTWIDTH] IN BLOOD BY AUTOMATED COUNT: 24.7 % (ref 11.5–14.5)
ERYTHROCYTE [DISTWIDTH] IN BLOOD BY AUTOMATED COUNT: 78.8 FL
GFR SERPLBLD BASED ON 1.73 SQ M-ARVRAT: >60 ML/MIN/{1.73_M2}
GLOBULIN UR ELPH-MCNC: 2.3 G/DL (ref 2–3.5)
GLUCOSE SERPL-MCNC: 130 MG/DL (ref 65–99)
HBA1C MFR BLD: 10.2 G/DL (ref 12–16)
HCT VFR BLD AUTO: 30.2 % (ref 37–47)
LYMPHOCYTES # BLD AUTO: 1.23 10*3/UL (ref 1–5)
LYMPHOCYTES NFR BLD AUTO: 19.8 % (ref 20–45)
MCH RBC QN AUTO: 30.1 PG (ref 27–31)
MCHC RBC AUTO-ENTMCNC: 33.8 G/DL (ref 33–37)
MCV RBC AUTO: 89.1 FL (ref 81–99)
MONOCYTES # BLD AUTO: 0.56 10*3/UL (ref 0.2–1.2)
MONOCYTES NFR BLD MANUAL: 9 % (ref 3–10)
NEUTROPHILS # BLD AUTO: 4.12 10*3/UL (ref 2–8)
NEUTROPHILS NFR BLD MANUAL: 66.6 % (ref 30–85)
OSMOLALITY SERPL CALC.SUM OF ELEC: 287 MOSM/KG (ref 273–304)
PATHOLOGY REVIEW: NORMAL
PLATELET # BLD AUTO: 152 10*3/UL (ref 130–400)
PMV BLD AUTO: 9.6 FL (ref 7.4–10.4)
POTASSIUM SERPL-SCNC: 4.1 MMOL/L (ref 3.5–5.3)
RBC MORPH BLD: 3.39 10*6/UL (ref 4.2–5.4)
SODIUM SERPL-SCNC: 137 MMOL/L (ref 135–147)
WBC # BLD AUTO: 6.2 10*3/UL (ref 4.8–10.8)

## 2020-08-10 ENCOUNTER — OFFICE VISIT CONVERTED (OUTPATIENT)
Dept: ONCOLOGY | Facility: HOSPITAL | Age: 70
End: 2020-08-10
Attending: INTERNAL MEDICINE

## 2020-08-10 ENCOUNTER — DOCUMENTATION (OUTPATIENT)
Dept: GENETICS | Facility: HOSPITAL | Age: 70
End: 2020-08-10

## 2020-08-10 LAB
ALBUMIN SERPL-MCNC: 4.1 G/DL (ref 3.5–5)
ALBUMIN/GLOB SERPL: 1.7 {RATIO} (ref 1.4–2.6)
ALP SERPL-CCNC: 121 U/L (ref 43–160)
ALT SERPL-CCNC: 24 U/L (ref 10–40)
ANION GAP SERPL CALC-SCNC: 12 MMOL/L (ref 8–19)
AST SERPL-CCNC: 32 U/L (ref 15–50)
BASOPHILS # BLD AUTO: 0.02 10*3/UL (ref 0–0.2)
BASOPHILS NFR BLD AUTO: 0.3 % (ref 0–3)
BILIRUB SERPL-MCNC: 0.37 MG/DL (ref 0.2–1.3)
BUN SERPL-MCNC: 20 MG/DL (ref 5–25)
BUN/CREAT SERPL: 26 {RATIO} (ref 6–20)
CALCIUM SERPL-MCNC: 9 MG/DL (ref 8.7–10.4)
CHLORIDE SERPL-SCNC: 102 MMOL/L (ref 99–111)
CONV ABS IMM GRAN: 0.03 10*3/UL (ref 0–0.54)
CONV CO2: 24 MMOL/L (ref 22–32)
CONV EOSINOPHILS PERCENT BY MANUAL COUNT: 4.2 % (ref 0–7)
CONV IMMATURE GRAN: 0.5 % (ref 0–0.4)
CONV TOTAL PROTEIN: 6.5 G/DL (ref 6.3–8.2)
CREAT UR-MCNC: 0.76 MG/DL (ref 0.5–0.9)
EOSINOPHIL # BLD MANUAL: 0.25 10*3/UL (ref 0–0.7)
ERYTHROCYTE [DISTWIDTH] IN BLOOD BY AUTOMATED COUNT: 24.4 % (ref 11.5–14.5)
ERYTHROCYTE [DISTWIDTH] IN BLOOD BY AUTOMATED COUNT: 80.1 FL
GFR SERPLBLD BASED ON 1.73 SQ M-ARVRAT: >60 ML/MIN/{1.73_M2}
GLOBULIN UR ELPH-MCNC: 2.4 G/DL (ref 2–3.5)
GLUCOSE SERPL-MCNC: 173 MG/DL (ref 65–99)
HBA1C MFR BLD: 10.7 G/DL (ref 12–16)
HCT VFR BLD AUTO: 31.7 % (ref 37–47)
LYMPHOCYTES # BLD AUTO: 0.91 10*3/UL (ref 1–5)
LYMPHOCYTES NFR BLD AUTO: 15.4 % (ref 20–45)
MCH RBC QN AUTO: 30.8 PG (ref 27–31)
MCHC RBC AUTO-ENTMCNC: 33.8 G/DL (ref 33–37)
MCV RBC AUTO: 91.4 FL (ref 81–99)
MONOCYTES # BLD AUTO: 0.59 10*3/UL (ref 0.2–1.2)
MONOCYTES NFR BLD MANUAL: 10 % (ref 3–10)
NEUTROPHILS # BLD AUTO: 4.09 10*3/UL (ref 2–8)
NEUTROPHILS NFR BLD MANUAL: 69.6 % (ref 30–85)
OSMOLALITY SERPL CALC.SUM OF ELEC: 285 MOSM/KG (ref 273–304)
PATHOLOGY REVIEW: NORMAL
PLATELET # BLD AUTO: 167 10*3/UL (ref 130–400)
PMV BLD AUTO: 9.6 FL (ref 7.4–10.4)
POTASSIUM SERPL-SCNC: 3.8 MMOL/L (ref 3.5–5.3)
RBC MORPH BLD: 3.47 10*6/UL (ref 4.2–5.4)
SODIUM SERPL-SCNC: 134 MMOL/L (ref 135–147)
WBC # BLD AUTO: 5.89 10*3/UL (ref 4.8–10.8)

## 2020-08-11 NOTE — PROGRESS NOTES
Yvrose Duncan, a 70-year-old female, was referred for genetic counseling due to a personal history of pancreatic cancer.  Genetic counseling was provided via telemedicine.  Ms. Duncan was recently diagnosed with pancreatic cancer at age 70.  She is post-menopausal and retains her uterus and one fallopian tube/ovary.  She was interested in discussing her risk for a hereditary cancer syndrome.  Ms. Duncan was interested in pursuing a multi-gene panel to evaluate her risk of cancer, therefore the CancerNext panel was ordered through [employer] which analyzes 36 genes associated with an increased cancer risk. The genes on this panel include APC, JANNY, AXIN2, BARD1, BMPR1A, BRCA1, BRCA2, BRIP1, CDH1, CDK4, CDKN2A, CHEK2, DICER1, EPCAM, GREM1, HOXB13, MLH1, MRE11A, MSH2, MSH3, MSH6, MUTYH, NBN, NF1, NTHL1, PALB2, PMS2, POLD1, POLE, PTEN, RAD51C, RAD51D, RECQL, SMAD4, SMARCA4, STK11, and TP53. Results are expected in approximately 2-3 weeks.    PERTINENT FAMILY HISTORY: (See attached pedigree)   Brother: Metastatic prostate cancer, 75  Pat. Aunt: Breast cancer, >50  Pat. Aunt: Breast cancer, >50    We do not have medical records regarding any of these diagnoses.      RISK ASSESSMENT:  Ms. Duncan’s personal history of pancreatic cancer, as well as her family history of breast and prostate cancer raises the question of a hereditary cancer syndrome. We discussed BRCA1/2 testing as well as the option of pursuing a panel that would test for other genes known to impact cancer risk in addition to BRCA1/2.   Ms. Duncan clearly meets NCCN guidelines criteria for BRCA1/2 testing. These risk assessments are based on the family history information provided at the time of the appointment and could change in the future should new information be obtained.    GENETIC COUNSELING:  We reviewed the family history information in detail. Cases of cancer follow three general patterns: sporadic, familial, and hereditary.  While most cancer is  sporadic, some cases appear to occur in family clusters.  These cases are said to be familial and account for 10-20% of cancer cases.  Familial cases may be due to a combination of shared genes and environmental factors among family members.  In even fewer families, the risk for cancer is inherited, and the genes responsible for the increased cancer risk are known.      Family histories typical of hereditary cancer syndromes usually include multiple first- and second-degree relatives diagnosed with cancer types that define a syndrome.  These cases tend to be diagnosed at younger-than-expected ages and can be bilateral or multifocal.  The cancer in these families follows an autosomal dominant inheritance pattern, which indicates the likely presence of a mutation in a cancer susceptibility gene.  Children and siblings of an individual believed to carry this mutation have a 50% chance of inheriting that mutation, thereby inheriting the increased risk to develop cancer.  These mutations can be passed down from the maternal or the paternal lineage.    Hereditary pancreatic cancer accounts for 5-10% of all cases of pancreatic cancer.   The gene most often associated with a family history of pancreatic cancer is BRCA2. Mutations in BRCA2 confer up to a 7% risk for pancreatic cancer. Mutations in BRCA1 and BRCA2 also confer an increased risk for breast cancer, ovarian cancer, male breast cancer, and prostate cancer. Women with a BRCA1 or BRCA2 mutation have up to an 87% risk of breast cancer and up to a 44% risk of ovarian cancer. PALB2 is another breast cancer related gene that affects the risk of pancreatic cancer, and Hurtado syndrome is a hereditary colon cancer syndrome that is associated with pancreatic cancer.  Based on Ms. Duncan’s desire to get as much information as possible regarding her personal risks and potential risks for her family, she opted to pursue testing through a panel that would look at several other  genes known to increase the risk for cancer.    GENETIC TESTING:  The risks, benefits and limitations of genetic testing and implications for clinical management following testing were reviewed.  DNA test results can influence decisions regarding screening, prevention and surgical management.  Genetic testing can have significant psychological implications for both individuals and families.  Also discussed was the possibility of employment and insurance discrimination based on genetic test results and the laws in place to prevent this (BRANDIN).    We discussed panel testing, which would involve testing for BRCA1/2 as well as 34 additional genes that are associated with increased cancer risk. The benefits and limitations of genetic testing were discussed and Ms. Duncan decided to pursue testing via the panel. The implications of a positive or negative test result were discussed. We discussed the possibility that, in some cases, genetic test results may be informative or may be ambiguous due to the identification of a genetic variant. These variants may or may not be associated with an increased cancer risk.  With multigene panel testing, it is not uncommon for a variant of uncertain significance (VUS) to be identified.  If a VUS is identified, testing family members is typically not recommended and screening recommendations are made based on the family history.  The laboratories that perform genetic testing work to reclassify the VUS and send out an amended report if and when a VUS is reclassified.  The majority of variant findings are ultimately reclassified to a negative result.  Given her personal and family history, a negative test result would not eliminate all cancer risk to her relatives, although the risk would not be as high as it would with positive genetic testing.      PLAN: Genetic testing via the CancerNext panel through [employer] was ordered and results are expected in 2-3 weeks. We will contact Ms.  Dakota to discuss results once they are received.  Ms. Dakota is welcome to contact us in the meantime at [phone] with any questions she may have.      Shayy Solis MS, [facility], [facility]  Licensed Certified Genetic Counselor

## 2020-08-24 ENCOUNTER — HOSPITAL ENCOUNTER (OUTPATIENT)
Dept: OTHER | Facility: HOSPITAL | Age: 70
Setting detail: RECURRING SERIES
Discharge: HOME | End: 2020-11-22
Attending: INTERNAL MEDICINE

## 2020-08-24 ENCOUNTER — OFFICE VISIT CONVERTED (OUTPATIENT)
Dept: ONCOLOGY | Facility: HOSPITAL | Age: 70
End: 2020-08-24
Attending: INTERNAL MEDICINE

## 2020-08-24 LAB
ALBUMIN SERPL-MCNC: 3.9 G/DL (ref 3.5–5)
ALBUMIN/GLOB SERPL: 1.9 {RATIO} (ref 1.4–2.6)
ALP SERPL-CCNC: 111 U/L (ref 43–160)
ALT SERPL-CCNC: 21 U/L (ref 10–40)
ANION GAP SERPL CALC-SCNC: 10 MMOL/L (ref 8–19)
AST SERPL-CCNC: 25 U/L (ref 15–50)
BASOPHILS # BLD AUTO: 0.04 10*3/UL (ref 0–0.2)
BASOPHILS NFR BLD AUTO: 0.6 % (ref 0–3)
BILIRUB SERPL-MCNC: <0.15 MG/DL (ref 0.2–1.3)
BUN SERPL-MCNC: 13 MG/DL (ref 5–25)
BUN/CREAT SERPL: 18 {RATIO} (ref 6–20)
CALCIUM SERPL-MCNC: 8.7 MG/DL (ref 8.7–10.4)
CHLORIDE SERPL-SCNC: 105 MMOL/L (ref 99–111)
CONV ABS IMM GRAN: 0.03 10*3/UL (ref 0–0.54)
CONV CO2: 26 MMOL/L (ref 22–32)
CONV EOSINOPHILS PERCENT BY MANUAL COUNT: 5.5 % (ref 0–7)
CONV IMMATURE GRAN: 0.5 % (ref 0–0.4)
CONV TOTAL PROTEIN: 6 G/DL (ref 6.3–8.2)
CREAT UR-MCNC: 0.73 MG/DL (ref 0.5–0.9)
EOSINOPHIL # BLD MANUAL: 0.35 10*3/UL (ref 0–0.7)
ERYTHROCYTE [DISTWIDTH] IN BLOOD BY AUTOMATED COUNT: 22.3 % (ref 11.5–14.5)
ERYTHROCYTE [DISTWIDTH] IN BLOOD BY AUTOMATED COUNT: 76.8 FL
GFR SERPLBLD BASED ON 1.73 SQ M-ARVRAT: >60 ML/MIN/{1.73_M2}
GLOBULIN UR ELPH-MCNC: 2.1 G/DL (ref 2–3.5)
GLUCOSE SERPL-MCNC: 129 MG/DL (ref 65–99)
HBA1C MFR BLD: 9.9 G/DL (ref 12–16)
HCT VFR BLD AUTO: 28.7 % (ref 37–47)
LYMPHOCYTES # BLD AUTO: 1.26 10*3/UL (ref 1–5)
LYMPHOCYTES NFR BLD AUTO: 19.9 % (ref 20–45)
MCH RBC QN AUTO: 32.5 PG (ref 27–31)
MCHC RBC AUTO-ENTMCNC: 34.5 G/DL (ref 33–37)
MCV RBC AUTO: 94.1 FL (ref 81–99)
MONOCYTES # BLD AUTO: 0.67 10*3/UL (ref 0.2–1.2)
MONOCYTES NFR BLD MANUAL: 10.6 % (ref 3–10)
NEUTROPHILS # BLD AUTO: 3.97 10*3/UL (ref 2–8)
NEUTROPHILS NFR BLD MANUAL: 62.9 % (ref 30–85)
OSMOLALITY SERPL CALC.SUM OF ELEC: 286 MOSM/KG (ref 273–304)
PATHOLOGY REVIEW: NORMAL
PLATELET # BLD AUTO: 150 10*3/UL (ref 130–400)
PMV BLD AUTO: 9.7 FL (ref 7.4–10.4)
POTASSIUM SERPL-SCNC: 4 MMOL/L (ref 3.5–5.3)
RBC MORPH BLD: 3.05 10*6/UL (ref 4.2–5.4)
SODIUM SERPL-SCNC: 137 MMOL/L (ref 135–147)
WBC # BLD AUTO: 6.32 10*3/UL (ref 4.8–10.8)

## 2020-08-25 ENCOUNTER — DOCUMENTATION (OUTPATIENT)
Dept: GENETICS | Facility: HOSPITAL | Age: 70
End: 2020-08-25

## 2020-08-25 NOTE — PROGRESS NOTES
Yvrose Duncan, a 70-year-old female, was referred for genetic counseling due to a personal history of pancreatic cancer.  Genetic counseling was provided via telemedicine.  Ms. Duncan was recently diagnosed with pancreatic cancer at age 70.  She is post-menopausal and retains her uterus and one fallopian tube/ovary.  She was interested in discussing her risk for a hereditary cancer syndrome.  Ms. Duncan was interested in pursuing a multi-gene panel to evaluate her risk of cancer, therefore the CancerNext panel was ordered through [employer] which analyzes 36 genes associated with an increased cancer risk. The genes on this panel include APC, JANNY, AXIN2, BARD1, BMPR1A, BRCA1, BRCA2, BRIP1, CDH1, CDK4, CDKN2A, CHEK2, DICER1, EPCAM, GREM1, HOXB13, MLH1, MRE11A, MSH2, MSH3, MSH6, MUTYH, NBN, NF1, NTHL1, PALB2, PMS2, POLD1, POLE, PTEN, RAD51C, RAD51D, RECQL, SMAD4, SMARCA4, STK11, and TP53. Genetic testing was negative by sequencing and rearrangement testing for mutations in BRCA1/2 and 34 additional genes on the CancerNext panel (see attached results). These normal results were discussed with Ms. Duncan, by telephone on 8/25/20.    PERTINENT FAMILY HISTORY: (See attached pedigree)   Brother: Metastatic prostate cancer, 75  Pat. Aunt: Breast cancer, >50  Pat. Aunt: Breast cancer, >50    We do not have medical records regarding any of these diagnoses.      RISK ASSESSMENT:  Ms. Duncan’s personal history of pancreatic cancer, as well as her family history of breast and prostate cancer raises the question of a hereditary cancer syndrome. We discussed BRCA1/2 testing as well as the option of pursuing a panel that would test for other genes known to impact cancer risk in addition to BRCA1/2.   Ms. Duncan clearly meets NCCN guidelines criteria for BRCA1/2 testing. These risk assessments are based on the family history information provided at the time of the appointment and could change in the future should new information be  obtained.    GENETIC COUNSELING:  We reviewed the family history information in detail. Cases of cancer follow three general patterns: sporadic, familial, and hereditary.  While most cancer is sporadic, some cases appear to occur in family clusters.  These cases are said to be familial and account for 10-20% of cancer cases.  Familial cases may be due to a combination of shared genes and environmental factors among family members.  In even fewer families, the risk for cancer is inherited, and the genes responsible for the increased cancer risk are known.      Family histories typical of hereditary cancer syndromes usually include multiple first- and second-degree relatives diagnosed with cancer types that define a syndrome.  These cases tend to be diagnosed at younger-than-expected ages and can be bilateral or multifocal.  The cancer in these families follows an autosomal dominant inheritance pattern, which indicates the likely presence of a mutation in a cancer susceptibility gene.  Children and siblings of an individual believed to carry this mutation have a 50% chance of inheriting that mutation, thereby inheriting the increased risk to develop cancer.  These mutations can be passed down from the maternal or the paternal lineage.    Hereditary pancreatic cancer accounts for 5-10% of all cases of pancreatic cancer.   The gene most often associated with a family history of pancreatic cancer is BRCA2. Mutations in BRCA2 confer up to a 7% risk for pancreatic cancer. Mutations in BRCA1 and BRCA2 also confer an increased risk for breast cancer, ovarian cancer, male breast cancer, and prostate cancer. Women with a BRCA1 or BRCA2 mutation have up to an 87% risk of breast cancer and up to a 44% risk of ovarian cancer. PALB2 is another breast cancer related gene that affects the risk of pancreatic cancer, and Hurtado syndrome is a hereditary colon cancer syndrome that is associated with pancreatic cancer.  Based on Ms.  Dakota’s desire to get as much information as possible regarding her personal risks and potential risks for her family, she opted to pursue testing through a panel that would look at several other genes known to increase the risk for cancer.    GENETIC TESTING:  The risks, benefits and limitations of genetic testing and implications for clinical management following testing were reviewed.  DNA test results can influence decisions regarding screening, prevention and surgical management.  Genetic testing can have significant psychological implications for both individuals and families.  Also discussed was the possibility of employment and insurance discrimination based on genetic test results and the laws in place to prevent this (BRANDIN).    We discussed panel testing, which would involve testing for BRCA1/2 as well as 34 additional genes that are associated with increased cancer risk. The benefits and limitations of genetic testing were discussed and Ms. Duncan decided to pursue testing via the panel. The implications of a positive or negative test result were discussed. We discussed the possibility that, in some cases, genetic test results may be informative or may be ambiguous due to the identification of a genetic variant. These variants may or may not be associated with an increased cancer risk.  With multigene panel testing, it is not uncommon for a variant of uncertain significance (VUS) to be identified.  If a VUS is identified, testing family members is typically not recommended and screening recommendations are made based on the family history.  The laboratories that perform genetic testing work to reclassify the VUS and send out an amended report if and when a VUS is reclassified.  The majority of variant findings are ultimately reclassified to a negative result.  Given her personal and family history, a negative test result would not eliminate all cancer risk to her relatives, although the risk would not be  as high as it would with positive genetic testing.      TEST RESULTS:  Genetic testing was negative by sequencing and rearrangement testing for mutations in the 36 genes on the CancerNext panel. The negative result greatly lowers the risk of a hereditary cancer syndrome. At this time, we are unable to determine why Ms. Duncan developed pancreatic cancer. This assessment is based on the information provided at the time of the consultation.    PLAN: Genetic counseling remains available to Ms. Duncan and her family, and she is welcome to contact us with any questions she may have at [phone].      Shayy Solis MS, [facility] [place], [facility]  Licensed Certified Genetic Counselor       Cc: Eboni Acharya MD, PhD   Yvrose Duncan

## 2020-09-08 ENCOUNTER — HOSPITAL ENCOUNTER (OUTPATIENT)
Dept: CT IMAGING | Facility: HOSPITAL | Age: 70
Discharge: HOME | End: 2020-09-08
Attending: INTERNAL MEDICINE

## 2020-09-14 ENCOUNTER — OFFICE VISIT CONVERTED (OUTPATIENT)
Dept: ONCOLOGY | Facility: HOSPITAL | Age: 70
End: 2020-09-14
Attending: INTERNAL MEDICINE

## 2020-09-14 ENCOUNTER — HOSPITAL ENCOUNTER (OUTPATIENT)
Dept: OTHER | Facility: HOSPITAL | Age: 70
Discharge: HOME | End: 2020-09-14
Attending: INTERNAL MEDICINE

## 2020-09-15 ENCOUNTER — CONVERSION ENCOUNTER (OUTPATIENT)
Dept: OTHER | Facility: HOSPITAL | Age: 70
End: 2020-09-15

## 2020-09-15 ENCOUNTER — OFFICE VISIT CONVERTED (OUTPATIENT)
Dept: GASTROENTEROLOGY | Facility: CLINIC | Age: 70
End: 2020-09-15
Attending: NURSE PRACTITIONER

## 2020-10-19 ENCOUNTER — HOSPITAL ENCOUNTER (OUTPATIENT)
Dept: OTHER | Facility: HOSPITAL | Age: 70
Discharge: HOME | End: 2020-10-19
Attending: INTERNAL MEDICINE

## 2020-10-22 ENCOUNTER — HOSPITAL ENCOUNTER (OUTPATIENT)
Dept: PREADMISSION TESTING | Facility: HOSPITAL | Age: 70
Discharge: HOME | End: 2020-10-22
Attending: INTERNAL MEDICINE

## 2020-10-24 LAB — SARS-COV-2 RNA SPEC QL NAA+PROBE: NOT DETECTED

## 2020-10-27 ENCOUNTER — HOSPITAL ENCOUNTER (OUTPATIENT)
Dept: GASTROENTEROLOGY | Facility: HOSPITAL | Age: 70
Setting detail: HOSPITAL OUTPATIENT SURGERY
Discharge: HOME | End: 2020-10-27
Attending: INTERNAL MEDICINE

## 2020-10-27 LAB — GLUCOSE BLD-MCNC: 173 MG/DL (ref 65–99)

## 2020-12-16 ENCOUNTER — HOSPITAL ENCOUNTER (OUTPATIENT)
Dept: CT IMAGING | Facility: HOSPITAL | Age: 70
Discharge: HOME | End: 2020-12-16
Attending: INTERNAL MEDICINE

## 2020-12-16 LAB
CREAT BLD-MCNC: 0.4 MG/DL (ref 0.6–1.4)
GFR SERPLBLD BASED ON 1.73 SQ M-ARVRAT: >60 ML/MIN/{1.73_M2}

## 2020-12-17 ENCOUNTER — HOSPITAL ENCOUNTER (OUTPATIENT)
Dept: OTHER | Facility: HOSPITAL | Age: 70
Discharge: HOME | End: 2020-12-17
Attending: INTERNAL MEDICINE

## 2020-12-17 ENCOUNTER — OFFICE VISIT CONVERTED (OUTPATIENT)
Dept: ONCOLOGY | Facility: HOSPITAL | Age: 70
End: 2020-12-17
Attending: INTERNAL MEDICINE

## 2020-12-17 LAB
ALBUMIN SERPL-MCNC: 4 G/DL (ref 3.5–5)
ALBUMIN/GLOB SERPL: 1.5 {RATIO} (ref 1.4–2.6)
ALP SERPL-CCNC: 132 U/L (ref 43–160)
ALT SERPL-CCNC: 20 U/L (ref 10–40)
ANION GAP SERPL CALC-SCNC: 13 MMOL/L (ref 8–19)
AST SERPL-CCNC: 23 U/L (ref 15–50)
BASOPHILS # BLD AUTO: 0.06 10*3/UL (ref 0–0.2)
BASOPHILS NFR BLD AUTO: 0.7 % (ref 0–3)
BILIRUB SERPL-MCNC: 0.39 MG/DL (ref 0.2–1.3)
BUN SERPL-MCNC: 16 MG/DL (ref 5–25)
BUN/CREAT SERPL: 20 {RATIO} (ref 6–20)
CALCIUM SERPL-MCNC: 8.8 MG/DL (ref 8.7–10.4)
CANCER AG19-9 SERPL-ACNC: 27.3 U/ML (ref 0–35)
CHLORIDE SERPL-SCNC: 101 MMOL/L (ref 99–111)
CONV ABS IMM GRAN: 0.02 10*3/UL (ref 0–0.2)
CONV CO2: 26 MMOL/L (ref 22–32)
CONV IMMATURE GRAN: 0.2 % (ref 0–1.8)
CONV TOTAL PROTEIN: 6.7 G/DL (ref 6.3–8.2)
CREAT UR-MCNC: 0.81 MG/DL (ref 0.5–0.9)
DEPRECATED RDW RBC AUTO: 43.4 FL (ref 36.4–46.3)
EOSINOPHIL # BLD AUTO: 0.31 10*3/UL (ref 0–0.7)
EOSINOPHIL # BLD AUTO: 3.7 % (ref 0–7)
ERYTHROCYTE [DISTWIDTH] IN BLOOD BY AUTOMATED COUNT: 13.2 % (ref 11.7–14.4)
GFR SERPLBLD BASED ON 1.73 SQ M-ARVRAT: >60 ML/MIN/{1.73_M2}
GLOBULIN UR ELPH-MCNC: 2.7 G/DL (ref 2–3.5)
GLUCOSE SERPL-MCNC: 204 MG/DL (ref 65–99)
HCT VFR BLD AUTO: 35.8 % (ref 37–47)
HGB BLD-MCNC: 12 G/DL (ref 12–16)
LYMPHOCYTES # BLD AUTO: 1.86 10*3/UL (ref 1–5)
LYMPHOCYTES NFR BLD AUTO: 22.3 % (ref 20–45)
MCH RBC QN AUTO: 30.1 PG (ref 27–31)
MCHC RBC AUTO-ENTMCNC: 33.5 G/DL (ref 33–37)
MCV RBC AUTO: 89.7 FL (ref 81–99)
MONOCYTES # BLD AUTO: 0.44 10*3/UL (ref 0.2–1.2)
MONOCYTES NFR BLD AUTO: 5.3 % (ref 3–10)
NEUTROPHILS # BLD AUTO: 5.66 10*3/UL (ref 2–8)
NEUTROPHILS NFR BLD AUTO: 67.8 % (ref 30–85)
NRBC CBCN: 0 % (ref 0–0.7)
OSMOLALITY SERPL CALC.SUM OF ELEC: 289 MOSM/KG (ref 273–304)
PLATELET # BLD AUTO: 182 10*3/UL (ref 130–400)
PMV BLD AUTO: 10.1 FL (ref 9.4–12.3)
POTASSIUM SERPL-SCNC: 3.8 MMOL/L (ref 3.5–5.3)
RBC # BLD AUTO: 3.99 10*6/UL (ref 4.2–5.4)
SODIUM SERPL-SCNC: 136 MMOL/L (ref 135–147)
WBC # BLD AUTO: 8.35 10*3/UL (ref 4.8–10.8)

## 2021-02-23 ENCOUNTER — HOSPITAL ENCOUNTER (OUTPATIENT)
Dept: ONCOLOGY | Facility: HOSPITAL | Age: 71
Discharge: HOME | End: 2021-02-23
Attending: INTERNAL MEDICINE

## 2021-03-12 ENCOUNTER — HOSPITAL ENCOUNTER (OUTPATIENT)
Dept: CT IMAGING | Facility: HOSPITAL | Age: 71
Discharge: HOME | End: 2021-03-12
Attending: INTERNAL MEDICINE

## 2021-03-12 LAB
ALBUMIN SERPL-MCNC: 4 G/DL (ref 3.5–5)
ALBUMIN/GLOB SERPL: 1.5 {RATIO} (ref 1.4–2.6)
ALP SERPL-CCNC: 150 U/L (ref 43–160)
ALT SERPL-CCNC: 29 U/L (ref 10–40)
ANION GAP SERPL CALC-SCNC: 15 MMOL/L (ref 8–19)
AST SERPL-CCNC: 27 U/L (ref 15–50)
BASOPHILS # BLD AUTO: 0.05 10*3/UL (ref 0–0.2)
BASOPHILS NFR BLD AUTO: 0.6 % (ref 0–3)
BILIRUB SERPL-MCNC: 0.3 MG/DL (ref 0.2–1.3)
BUN SERPL-MCNC: 19 MG/DL (ref 5–25)
BUN/CREAT SERPL: 25 {RATIO} (ref 6–20)
CALCIUM SERPL-MCNC: 8.7 MG/DL (ref 8.7–10.4)
CANCER AG19-9 SERPL-ACNC: 30.9 U/ML (ref 0–35)
CHLORIDE SERPL-SCNC: 102 MMOL/L (ref 99–111)
CONV ABS IMM GRAN: 0.04 10*3/UL (ref 0–0.2)
CONV CO2: 25 MMOL/L (ref 22–32)
CONV IMMATURE GRAN: 0.5 % (ref 0–1.8)
CONV TOTAL PROTEIN: 6.7 G/DL (ref 6.3–8.2)
CREAT BLD-MCNC: 0.6 MG/DL (ref 0.6–1.4)
CREAT UR-MCNC: 0.77 MG/DL (ref 0.5–0.9)
DEPRECATED RDW RBC AUTO: 42.6 FL (ref 36.4–46.3)
EOSINOPHIL # BLD AUTO: 0.18 10*3/UL (ref 0–0.7)
EOSINOPHIL # BLD AUTO: 2.1 % (ref 0–7)
ERYTHROCYTE [DISTWIDTH] IN BLOOD BY AUTOMATED COUNT: 12.9 % (ref 11.7–14.4)
GFR SERPLBLD BASED ON 1.73 SQ M-ARVRAT: >60 ML/MIN/{1.73_M2}
GFR SERPLBLD BASED ON 1.73 SQ M-ARVRAT: >60 ML/MIN/{1.73_M2}
GLOBULIN UR ELPH-MCNC: 2.7 G/DL (ref 2–3.5)
GLUCOSE SERPL-MCNC: 238 MG/DL (ref 65–99)
HCT VFR BLD AUTO: 34.8 % (ref 37–47)
HGB BLD-MCNC: 11.9 G/DL (ref 12–16)
LYMPHOCYTES # BLD AUTO: 1.96 10*3/UL (ref 1–5)
LYMPHOCYTES NFR BLD AUTO: 22.8 % (ref 20–45)
MCH RBC QN AUTO: 30.7 PG (ref 27–31)
MCHC RBC AUTO-ENTMCNC: 34.2 G/DL (ref 33–37)
MCV RBC AUTO: 89.7 FL (ref 81–99)
MONOCYTES # BLD AUTO: 0.52 10*3/UL (ref 0.2–1.2)
MONOCYTES NFR BLD AUTO: 6.1 % (ref 3–10)
NEUTROPHILS # BLD AUTO: 5.83 10*3/UL (ref 2–8)
NEUTROPHILS NFR BLD AUTO: 67.9 % (ref 30–85)
NRBC CBCN: 0 % (ref 0–0.7)
OSMOLALITY SERPL CALC.SUM OF ELEC: 296 MOSM/KG (ref 273–304)
PLATELET # BLD AUTO: 168 10*3/UL (ref 130–400)
PMV BLD AUTO: 9.8 FL (ref 9.4–12.3)
POTASSIUM SERPL-SCNC: 4.1 MMOL/L (ref 3.5–5.3)
RBC # BLD AUTO: 3.88 10*6/UL (ref 4.2–5.4)
SODIUM SERPL-SCNC: 138 MMOL/L (ref 135–147)
WBC # BLD AUTO: 8.58 10*3/UL (ref 4.8–10.8)

## 2021-03-26 ENCOUNTER — HOSPITAL ENCOUNTER (OUTPATIENT)
Dept: OTHER | Facility: HOSPITAL | Age: 71
Discharge: HOME | End: 2021-03-26
Attending: INTERNAL MEDICINE

## 2021-03-26 ENCOUNTER — OFFICE VISIT CONVERTED (OUTPATIENT)
Dept: ONCOLOGY | Facility: HOSPITAL | Age: 71
End: 2021-03-26
Attending: INTERNAL MEDICINE

## 2021-04-01 ENCOUNTER — OFFICE VISIT CONVERTED (OUTPATIENT)
Dept: GASTROENTEROLOGY | Facility: CLINIC | Age: 71
End: 2021-04-01
Attending: NURSE PRACTITIONER

## 2021-04-12 ENCOUNTER — OFFICE VISIT CONVERTED (OUTPATIENT)
Dept: SURGERY | Facility: CLINIC | Age: 71
End: 2021-04-12
Attending: SURGERY

## 2021-04-20 ENCOUNTER — HOSPITAL ENCOUNTER (OUTPATIENT)
Dept: OTHER | Facility: HOSPITAL | Age: 71
Discharge: HOME | End: 2021-04-20
Attending: NURSE PRACTITIONER

## 2021-04-20 LAB
CONV AFP: 2 NG/ML (ref 0–8.7)
FERRITIN SERPL-MCNC: 391 NG/ML (ref 10–200)
IRON SATN MFR SERPL: 31 % (ref 20–55)
IRON SERPL-MCNC: 108 UG/DL (ref 60–170)
TIBC SERPL-MCNC: 343 UG/DL (ref 245–450)
TRANSFERRIN SERPL-MCNC: 240 MG/DL (ref 250–380)

## 2021-04-21 LAB
ALBUMIN SERPL-MCNC: 4 G/DL (ref 2.9–4.4)
ALBUMIN/GLOB SERPL: 1.3 {RATIO} (ref 0.7–1.7)
ALPHA2 GLOB SERPL ELPH-MCNC: 0.8 G/DL (ref 0.4–1)
BETA GLOBULIN: 1.2 G/DL (ref 0.7–1.3)
CERULOPLASMIN SERPL-MCNC: 26.6 MG/DL (ref 19–39)
CONV ALPHA-1-GLOBULIN: 0.2 G/DL (ref 0–0.4)
CONV HEPATITIS B SURFACE AG W CONFIRMATION RE: NEGATIVE
CONV IMMUNOGLOBULIN G (IGG): 836 MG/DL (ref 586–1602)
CONV IMMUNOGLOBULIN M (IGM): 163 MG/DL (ref 26–217)
CONV PE INTERPRETATION: ABNORMAL
CONV PE NOTE: ABNORMAL
CONV TOTAL PROTEIN: 7.1 G/DL (ref 6–8.5)
DEPRECATED MITOCHONDRIA M2 IGG SER-ACNC: <20 UNITS (ref 0–20)
DSDNA AB SER-ACNC: NEGATIVE [IU]/ML
ENA AB SER IA-ACNC: NEGATIVE {RATIO}
GAMMA GLOB SERPL ELPH-MCNC: 0.9 G/DL (ref 0.4–1.8)
GLOBULIN UR ELPH-MCNC: 3.1 G/DL (ref 2.2–3.9)
HAV IGM SERPL QL IA: NEGATIVE
HBV CORE IGM SERPL QL IA: NEGATIVE
HCV AB SER DONR QL: <0.1 S/CO RATIO (ref 0–0.9)
IGA SERPL-MCNC: 427 MG/DL (ref 64–422)
M-SPIKE: ABNORMAL G/DL
PROT PATTERN SERPL IFE-IMP: ABNORMAL

## 2021-04-22 LAB
COPPER SERPL-MCNC: 130 UG/DL (ref 80–158)
SMOOTH MUSCLE F-ACTIN AB IGG: 7 UNITS (ref 0–19)

## 2021-04-23 LAB — CONV NASH FIBROSURE: NORMAL

## 2021-04-26 LAB
A1AT SERPL-MCNC: 127 MG/DL (ref 101–187)
PHENOTYPE: NORMAL

## 2021-05-09 VITALS
SYSTOLIC BLOOD PRESSURE: 128 MMHG | BODY MASS INDEX: 34.01 KG/M2 | HEIGHT: 65 IN | WEIGHT: 204.12 LBS | OXYGEN SATURATION: 97 % | TEMPERATURE: 96.7 F | DIASTOLIC BLOOD PRESSURE: 80 MMHG | HEART RATE: 91 BPM

## 2021-05-09 VITALS
OXYGEN SATURATION: 97 % | TEMPERATURE: 97.6 F | SYSTOLIC BLOOD PRESSURE: 140 MMHG | BODY MASS INDEX: 33.51 KG/M2 | HEIGHT: 65 IN | HEART RATE: 92 BPM | DIASTOLIC BLOOD PRESSURE: 70 MMHG | WEIGHT: 201.12 LBS

## 2021-05-09 VITALS
TEMPERATURE: 96.8 F | HEART RATE: 82 BPM | SYSTOLIC BLOOD PRESSURE: 160 MMHG | DIASTOLIC BLOOD PRESSURE: 80 MMHG | HEIGHT: 65 IN | OXYGEN SATURATION: 98 % | WEIGHT: 195 LBS | BODY MASS INDEX: 32.49 KG/M2

## 2021-05-09 VITALS
DIASTOLIC BLOOD PRESSURE: 80 MMHG | TEMPERATURE: 96.8 F | WEIGHT: 195 LBS | SYSTOLIC BLOOD PRESSURE: 132 MMHG | HEART RATE: 93 BPM | OXYGEN SATURATION: 97 %

## 2021-05-09 VITALS
HEIGHT: 65 IN | TEMPERATURE: 96.6 F | BODY MASS INDEX: 34.21 KG/M2 | SYSTOLIC BLOOD PRESSURE: 122 MMHG | OXYGEN SATURATION: 99 % | HEART RATE: 97 BPM | DIASTOLIC BLOOD PRESSURE: 76 MMHG | WEIGHT: 205.37 LBS

## 2021-05-09 VITALS
OXYGEN SATURATION: 96 % | TEMPERATURE: 96.8 F | WEIGHT: 198.25 LBS | HEIGHT: 65 IN | DIASTOLIC BLOOD PRESSURE: 80 MMHG | BODY MASS INDEX: 33.03 KG/M2 | HEART RATE: 103 BPM | SYSTOLIC BLOOD PRESSURE: 140 MMHG

## 2021-05-09 VITALS
WEIGHT: 194 LBS | DIASTOLIC BLOOD PRESSURE: 80 MMHG | OXYGEN SATURATION: 97 % | SYSTOLIC BLOOD PRESSURE: 140 MMHG | HEART RATE: 113 BPM | TEMPERATURE: 97.9 F

## 2021-05-11 NOTE — H&P
"   History and Physical      Patient Name: Yvrose Duncan   Patient ID: [id number]   Sex: Female   YOB: 1950    Primary Care Provider: Brock FONSECA   Referring Provider: Inna ALMONTE    Visit Date: April 12, 2021    Provider: Kishore Sofia MD   Location: [facility]   Location Address: [address] [place] [address]  [id number]   Location Phone: [phone]          Chief Complaint  · Hernia Consult      History Of Present Illness  Yvrose Duncan is a 70 year old /White female who presents to the office today as a consult from Inna ALMONTE.      Patient was referred for an evaluation for a ventral hernia.  The patient had a routine follow-up CT regarding pancreatic cancer.  It was done on 3/12/2021 and showed a mid abdominal ventral hernia measuring 6.4 cm in size and containing nondilated small loop bowels.  There was also a smaller hernia above the aforementioned hernia that measured 3 cm and contained a knuckle of small bowel.  There was also a hernia inferior to the first mention hernia in the midline that was 5.1 cm and contain nondilated small bowel and there was also a fourth hernia more inferiorly measuring 4.4 cm extent and containing a small loop of bowel.  Patient had a Whipple procedure in [facility] about 1 year ago and finished chemotherapy about 6 months ago.  She says the lymph nodes were involved but says \"they got it all out.\"  The patient does not have any abdominal pain.  Her only concern is that her abdominal wall bulges have gotten larger in size over the past few months.  No obstructive symptoms.  Other surgeries include a laparoscopic gallbladder removal and an open appendectomy.  I reviewed the radiology report and the CT scan images.       Past Medical History  Disease Name Date Onset Notes   Diabetes --  --    Essential hypertension --  --    Gallstones --  --    Heartburn --  --    Sleep apnea --  --  "         Past Surgical History  Procedure Name Date Notes   *No Past Surgical History --  --    Appendectomy --  --    Foot surgery --  --    Gallbladder --  --    Hernia Repair --  --    Ovary removal --  --          Medication List  Name Date Started Instructions   buspirone 5 mg oral tablet  take 1 tablet (5 mg) by oral route 2 times per day   Creon 36,000-114,000- 180,000 unit oral capsule,delayed release(DR/EC) 09/15/2020 take 2 capsules by oral route 3 times a day for 30 days w meals and 1 w snack   Levemir FlexTouch U-100 Insuln 100 unit/mL (3 mL) subcutaneous insulin pen  inject by subcutaneous route per prescriber's instructions. Insulin dosing requires individualization.   lisinopril oral  --    loratadine 10 mg oral tablet  take 1 tablet (10 mg) by oral route once daily   omeprazole 20 mg oral capsule,delayed release(DR/EC)  take 1 capsule (20 mg) by oral route once daily before a meal   prazosin 2 mg oral capsule  take 1 capsule (2 mg) by oral route 2 times per day   sertraline 50 mg oral tablet  take 1 tablet (50 mg) by oral route once daily   trazodone 50 mg oral tablet  --    turmeric 400 mg oral capsule  take 1 capsule by oral route daily         Allergy List  Allergen Name Date Reaction Notes   Latex --  --  --          Family Medical History  Disease Name Relative/Age Notes   Heart Attack (MI) Father/  Mother/   --    Diabetes Mother/   --    No family history of colorectal cancer  --    Family history of heart disease Mother/   Mother         Social History  Finding Status Start/Stop Quantity Notes   Alcohol Never --/-- --  does not drink   lives with spouse --  --/-- --  --     --  --/-- --  --    Retired --  --/-- --  --    Tobacco Never --/-- --  never smoker         Review of Systems  · Constitutional  o Denies  o : fatigue, night sweats  · Eyes  o Denies  o : double vision, blurred vision  · HENT  o Denies  o : vertigo, recent head injury  · Breasts  o Denies  o : abnormal changes in  "breast size, additional breast symptoms except as noted in the HPI  · Cardiovascular  o Denies  o : chest pain, irregular heart beats  · Respiratory  o Denies  o : shortness of breath, productive cough  · Gastrointestinal  o Denies  o : nausea, vomiting  · Genitourinary  o Denies  o : dysuria, urinary retention  · Integument  o Denies  o : hair growth change, new skin lesions  · Neurologic  o Denies  o : altered mental status, seizures  · Musculoskeletal  o Denies  o : joint swelling, limitation of motion  · Endocrine  o Denies  o : cold intolerance, heat intolerance  · Heme-Lymph  o Denies  o : petechiae, lymph node enlargement or tenderness  · Allergic-Immunologic  o Denies  o : frequent illnesses      Vitals  Date Time BP Position Site L\R Cuff Size HR RR TEMP (F) WT  HT  BMI kg/m2 BSA m2 O2 Sat FR L/min FiO2 HC       04/12/2021 09:47 AM       16  174lbs 8oz 5'  4\" 29.95 1.89             Physical Examination  · Constitutional  o Appearance  o : here alone, alert and in no acute distress, reliable historian  · Head and Face  o Head  o :   § Inspection  § : no visable deformities or lesions  · Eyes  o Conjunctivae  o : clear  o Sclerae  o : clear  · Neck  o Inspection/Palpation  o : normal appearance, no masses, trachea midline  · Respiratory  o Respiratory Effort  o : breathing unlabored, respiratory effort appears normal  o Inspection of Chest  o : normal appearance, no retractions  · Cardiovascular  o Heart  o : regular rate and rhythm  · Gastrointestinal  o Abdominal Examination  o :   § Abdomen  § : soft, nontender, nondistended. midline surgical scar. several midline bulges that are easily reducible  · Skin and Subcutaneous Tissue  o General Inspection  o : no visible concerning rashes or lesions present  · Neurologic  o Cranial Nerves  o : no obvious motor deficits  o Sensation  o : no obvious sensory deficits  o Gait and Station  o :   § Gait Screening  § : normal gait, able to stand without " diffculty  o Cerebellar Function  o : no obvious abnormalities  · Psychiatric  o Judgement and Insight  o : judgment and insight intact  o Mood and Affect  o : mood normal, affect appropriate          Assessment  · History of pancreatic cancer     V10.09/Z85.07  · Ventral incisional hernia     553.21/K43.2  easily reducible, minimally symptomatic      Plan  · Medications  o Medications have been Reconciled  o Transition of Care or Provider Policy  · Instructions  o Electronically Identified Patient Education Materials Provided Electronically     Diagnosis of ventral incisional hernia was discussed with the patient.  Given the patient's medical history and given that the hernias are minimally symptomatic, recommend watchful observation.  Signs and symptoms that would be concerning for an incarcerated hernia were discussed with the patient and she will seek medical attention promptly should any of those signs or symptoms occur.  Patient will schedule appointment to see me again if the hernias start to bother her in any way that significantly affects her quality of daily life.  At this time, the hernias are minimally symptomatic and surgery is not necessary.             Electronically Signed by: Kishore Sofia MD -Author on April 12, 2021 10:41:53 AM

## 2021-05-13 NOTE — PROGRESS NOTES
"   Progress Note      Patient Name: Yvrose Duncan   Patient ID: [id number]   Sex: Female   YOB: 1950    Primary Care Provider: Brock FONSECA   Referring Provider: Dr. Eboni Acharya MD    Visit Date: September 15, 2020    Provider: GAGE Phillip   Location: [facility] [address]   Location Address: [address] [place] [address]  [place]  [id number]   Location Phone: [phone]          Chief Complaint  · follow up       History Of Present Illness  Yvrose Duncan is a 70 year old /White female who presents to the office today.      Pt dx  w pancreatic cancer and had Whipple 4/2020 , path with invasive moderately to poorly differentiated adenocarcinoma of the pancreas as well as 5 of 21 lymph nodes, perineural invasion present. She has seen Dr Ayala and had chemo.  Dr. Clancy saw the patient in March when she was admitted with ascending cholangitis, suspicion of pancreatic cancer at that time.   8/24/20 Hgb 9.9, low Iron, CMP unremarkable   Pt states she's never had a colonoscopy. No blood in stool or black stool. Usually no abd pain, taking Creon before meals, some mild \"aching\" RUQ/LUQ at times. Denies diarrhea except occasionally  and occasionally also has constipation.   It seems pt's appt was to also manage her Creon Rx. Pt thinks her weight has stabilized since the weight loss she had after surgery.   Has been on Omeprazole for 20 yrs for HB, states it works well. Has skipped but sx return.       Past Medical History  Diabetes; Essential hypertension; Gallstones; Heartburn; Sleep apnea         Past Surgical History  *No Past Surgical History; Appendectomy; Foot surgery; Gallbladder; Hernia Repair; Ovary removal         Medication List  Name Date Started Instructions   buspirone 5 mg oral tablet  take 1 tablet (5 mg) by oral route 2 times per day   carvedilol 6.25 mg oral tablet  take 1 tablet (6.25 mg) by oral route 2 times per day with food   Creon " 36,000-114,000- 180,000 unit oral capsule,delayed release(DR/EC) 09/15/2020 take 2 capsules by oral route 3 times a day for 30 days w meals and 1 w snack   lisinopril oral  --    meloxicam 15 mg oral tablet  take 1 tablet (15 mg) by oral route once daily   omeprazole 20 mg oral capsule,delayed release(DR/EC)  take 1 capsule (20 mg) by oral route once daily before a meal   sertraline 50 mg oral tablet  take 1 tablet (50 mg) by oral route once daily   trazodone 50 mg oral tablet  --          Allergy List  Latex         Family Medical History  Heart Attack (MI); Diabetes; No family history of colorectal cancer; Family history of heart disease         Social History  Alcohol (Never); lives with spouse; ; Retired; Tobacco (Never)         Review of Systems  · Constitutional  o Admits  o : good general health lately, no acute distress  · Gastrointestinal  o Denies  o : additional gastrointestinal symptoms except as noted in the HPI  · Psychiatric  o Admits  o : pleasant affect      Physical Examination  · Constitutional  o Appearance  o : well developed, well-nourished, in no acute distress  · Head and Face  o Head  o :   § Inspection  § : atraumatic, normocephalic  · Eyes  o Sclerae  o : sclerae white, no sclerae icterus  · Neck  o Inspection/Palpation  o : supple  · Respiratory  o Respiratory Effort  o : breathing unlabored  o Inspection of Chest  o : normal appearance, no retractions  · Cardiovascular  o Peripheral Vascular System  o :   § Extremities  § : no cyanosis, clubbing or edema  · Gastrointestinal  o Abdominal Examination  o : soft, nontender to palpation  · Skin and Subcutaneous Tissue  o General Inspection  o : no lesions present, no rashes present  · Neurologic  o Mental Status Examination  o :   § Orientation  § : grossly oriented to person, place and time  § Speech/Language  § : communication ability within normal limits, voice quality normal, articulation of speech normal, no evidence of  aphasia  § Attention  § : attention normal, concentration abilities normal  o Sensation  o : grossly intact  o Gait and Station  o :   § Gait Screening  § : normal gait  · Psychiatric  o General  o : Alert and oriented x3  o Mood and Affect  o : Mood and affect are appropriate to circumstances          Assessment  · Pre-op exam     V72.84/Z01.818  · Abdominal pain     789.00/R10.9  · Anemia, iron deficiency     280.9/D50.9  · Heartburn     787.1/R12  persistent  · Pancreatic cancer     157.9/C25.9      Plan  · Orders  o [facility] Pre-Op Covid-19 Screening (72223) - - 09/15/2020  · Medications  o Golytely 236-22.74-6.74 -5.86 gram oral recon soln   SIG: take as directed   DISP: (1) 4000 ml bottle with 0 refills  Prescribed on 09/15/2020     o Creon 36,000-114,000- 180,000 unit oral capsule,delayed release(DR/EC)   SIG: take 2 capsules by oral route 3 times a day for 30 days w meals and 1 w snack   DISP: (240) capsules with 5 refills  Adjusted on 09/15/2020     · Instructions  o Please Sign Permit for: EGD/COLONOSCOPY  o Indication: iron def anemia, persist HB, recent pancreatic cancer s/p whipple  o Surgical Risk and Benefits: Possible risks/complications, benefits, and alternatives to surgical or invasive procedure have been explained to patient and/or legal guardian; Patient has been evaluated and can tolerate anesthesia and/or sedation. Risks, benefits, and alternatives to anesthesia and sedation have been explained to patient and/or legal guardian.  o Follow Up after Procedure.  o Patient was educated/instructed on their diagnosis, treatment and medications prior to discharge from the clinic today.  o Patient instructed to seek medical attention urgently for new or worsening symptoms.  o Encouraged to follow-up with Primary Care Provider for preventative care.            Electronically Signed by: GAGE Phillip -Author on September 15, 2020 06:26:42 PM

## 2021-05-14 VITALS — RESPIRATION RATE: 16 BRPM | HEIGHT: 64 IN | BODY MASS INDEX: 29.79 KG/M2 | WEIGHT: 174.5 LBS

## 2021-05-14 VITALS
DIASTOLIC BLOOD PRESSURE: 59 MMHG | HEART RATE: 66 BPM | TEMPERATURE: 97.9 F | HEIGHT: 66 IN | WEIGHT: 171 LBS | SYSTOLIC BLOOD PRESSURE: 147 MMHG | BODY MASS INDEX: 27.48 KG/M2

## 2021-05-14 VITALS
TEMPERATURE: 98 F | BODY MASS INDEX: 23.99 KG/M2 | WEIGHT: 149.25 LBS | DIASTOLIC BLOOD PRESSURE: 85 MMHG | HEIGHT: 66 IN | SYSTOLIC BLOOD PRESSURE: 160 MMHG | HEART RATE: 70 BPM

## 2021-05-14 NOTE — PROGRESS NOTES
Progress Note      Patient Name: Yvrose Duncan   Patient ID: [id number]   Sex: Female   YOB: 1950    Primary Care Provider: Brock FONSECA   Referring Provider: Inna ALMONTE    Visit Date: April 1, 2021    Provider: GAGE Phillip   Location: [facility]   Location Address: [address] [place] [address]  [place]  [id number]   Location Phone: [phone]          Chief Complaint  · F/U Colon  · F/U EGD      History Of Present Illness  Yvrose Duncan is a 70 year old /White female who presents to the office today.      Pt dx  w pancreatic cancer and had Whipple 4/2020 , path with invasive moderately to poorly differentiated adenocarcinoma of the pancreas as well as 5 of 21 lymph nodes, perineural invasion present. She has seen Dr Ayala and had chemo.  Dr. Clancy saw the patient in March 2020 when she was admitted with ascending cholangitis, suspicion of pancreatic cancer at that time.   8/24/20 Hgb 9.9, low Iron, CMP unremarkable     Pt was last seen 9/2020 to schedule colonoscopy and needed Creon Rx. Reported persistent HB on Omeprazole for 20+yrs.     EGD colonoscopy 10/27/2020: Medium-sized hiatal hernia, evidence of previous antrectomy gastrojejunostomy was seen, bile reflux related gastritis noted, stomach bx w mild chronic inactive gastritis, normal afferent and efferent loops; good prep, normal mucosa throughout the colon, 14 mm juvenile retention polyp removed from the ascending colon. Random colon bx negative.  Carafate was given.  Plan to repeat in 3 years    CT the abdomen chest and pelvis w contrast 3/12/2021: Cirrhosis of the liver noted, previous Whipple procedure, 4 incisional hernias described.   3/12/2021 hemoglobin 11.9 otherwise CBC unremarkable, CMP unremarkable, CA 19-9 normal    Pt has no c/o abd pain, bowels moving normally, no wt loss.   Pt doesn't rememer taking Carafate. Occasionally feels HB.   Pt feels she has an abd  "hernia. She finds this bothersome and uncomfortable, wearing elastic pants to be more comfortable.  Pt was not aware of hx of cirrhosis or fatty liver. NO hx ETOH.   c/o of pain in right arm after flu shot in November.       Past Medical History  Diabetes; Essential hypertension; Gallstones; Heartburn; Sleep apnea         Past Surgical History  *No Past Surgical History; Appendectomy; Foot surgery; Gallbladder; Hernia Repair; Ovary removal         Medication List  Name Date Started Instructions   buspirone 5 mg oral tablet  take 1 tablet (5 mg) by oral route 2 times per day   Creon 36,000-114,000- 180,000 unit oral capsule,delayed release(DR/EC) 09/15/2020 take 2 capsules by oral route 3 times a day for 30 days w meals and 1 w snack   lisinopril oral  --    omeprazole 20 mg oral capsule,delayed release(DR/EC)  take 1 capsule (20 mg) by oral route once daily before a meal   sertraline 50 mg oral tablet  take 1 tablet (50 mg) by oral route once daily   trazodone 50 mg oral tablet  --          Allergy List  Latex       Allergies Reconciled  Family Medical History  Heart Attack (MI); Diabetes; No family history of colorectal cancer; Family history of heart disease         Social History  Alcohol (Never); lives with spouse; ; Retired; Tobacco (Never)         Review of Systems  · Constitutional  o Admits  o : good general health lately, no acute distress  · Gastrointestinal  o Denies  o : additional gastrointestinal symptoms except as noted in the HPI  · Psychiatric  o Admits  o : pleasant affect      Vitals  Date Time BP Position Site L\R Cuff Size HR RR TEMP (F) WT  HT  BMI kg/m2 BSA m2 O2 Sat FR L/min FiO2 HC       09/15/2020 01:13 /85 Sitting    70 - R  98 149lbs 4oz 5'  6\" 24.09 1.78       04/01/2021 01:16 /59 Sitting    66 - R  97.9 171lbs 0oz 5'  6\" 27.6 1.9             Physical Examination  · Constitutional  o Appearance  o : well developed, well-nourished, in no acute distress  · Head and " Face  o Head  o :   § Inspection  § : atraumatic, normocephalic  · Eyes  o Sclerae  o : sclerae white, no sclerae icterus  · Neck  o Inspection/Palpation  o : supple  · Respiratory  o Respiratory Effort  o : breathing unlabored  o Inspection of Chest  o : normal appearance, no retractions  · Cardiovascular  o Peripheral Vascular System  o :   § Extremities  § : no cyanosis, clubbing or edema  · Gastrointestinal  o Abdominal Examination  o : soft, nontender to palpation--m/l incisional scar noted, in standing position large hernia noted just proximal to umbilicus, abd feels soft and no masses palpable in supine position  · Skin and Subcutaneous Tissue  o General Inspection  o : no lesions present, no rashes present  · Neurologic  o Mental Status Examination  o :   § Orientation  § : grossly oriented to person, place and time  § Speech/Language  § : communication ability within normal limits, voice quality normal, articulation of speech normal, no evidence of aphasia  § Attention  § : attention normal, concentration abilities normal  o Sensation  o : grossly intact  o Gait and Station  o :   § Gait Screening  § : normal gait  · Psychiatric  o General  o : Alert and oriented x3  o Mood and Affect  o : Mood and affect are appropriate to circumstances          Assessment  · Cirrhosis     571.5/K74.60  · Polyp of ascending colon       Benign neoplasm of ascending colon     211.3/D12.2  · Ventral hernia     553.20/K43.9  · History of pancreatic cancer     V10.09/Z85.07  · Bile reflux gastritis     535.40/K29.60      Plan  · Orders  o General Surgery Consult (GNSUR) - - 04/01/2021  o Acute hepatitis panel (HAV IgM, HbcAb IgM, HbsAg, HCV) (22529, 15612, 83422, 57744, 17756) - - 04/01/2021  o Copper level (03050) - - 04/01/2021  o Ceruloplasmin level (52347) - - 04/01/2021  o Anti-Smooth Muscle Antibody (ASMA) [facility] (07523) - - 04/01/2021  o Anti-mitochondrial antibody assay (14747) - -  04/01/2021  o Alpha-1-Antitrypsin/Phenotype [facility] (56846, 39339) - - 04/01/2021  o Iron Profile (Iron 19955 TIBC 42391 and Transferrin 24538) (IRONP) - - 04/01/2021  o PT/INR (93507) - - 04/01/2021  o AFP ser (36892) - - 04/01/2021  o CHERYL (antinuclear antibody profile) by enzyme immunoassay (89761) - - 04/01/2021  o Ferritin ser/plas (93155) - - 04/01/2021  o BYRNES Fibrosure [facility] (drawn at [facility] only and must be fasting 8 hours; requires HT and WT) (76189, 04410, 79567, 57133, 83633, 62150, 50716, 71190, 04789, 26096) - - 04/01/2021  o Immunoelectrophoresis, Serum [facility] (17308, 66749, 19112, 26215) - - 04/01/2021  · Medications  o Medications have been Reconciled  o Transition of Care or Provider Policy  · Instructions  o Encouraged to follow-up with Primary Care Provider for preventative care.  o Patient was educated/instructed on their diagnosis, treatment and medications prior to discharge from the clinic today.  o Patient instructed to seek medical attention urgently for new or worsening symptoms.  o Patient counseled on low carb diet written info given on this and on fatty liver. Wt loss encouraged.   o Follow Up: Next avaliable appointment  o Recommended complete hepatic w/u w new dx of cirrhosis  o Recommended pt d/w her PCP her right arm pain.   o Pt was interested in talking w surgeon regarding her abdominal hernias. Consult placed.            Electronically Signed by: GAGE Phillip -Author on April 1, 2021 03:09:11 PM

## 2021-05-16 VITALS — RESPIRATION RATE: 14 BRPM | HEIGHT: 66 IN | WEIGHT: 200.5 LBS | BODY MASS INDEX: 32.22 KG/M2

## 2021-05-16 VITALS — BODY MASS INDEX: 32.14 KG/M2 | HEIGHT: 66 IN | RESPIRATION RATE: 16 BRPM | WEIGHT: 200 LBS

## 2021-05-16 VITALS — BODY MASS INDEX: 32.14 KG/M2 | RESPIRATION RATE: 14 BRPM | WEIGHT: 200 LBS | HEIGHT: 66 IN

## 2021-05-22 ENCOUNTER — TRANSCRIBE ORDERS (OUTPATIENT)
Dept: ADMINISTRATIVE | Facility: HOSPITAL | Age: 71
End: 2021-05-22

## 2021-05-22 DIAGNOSIS — C25.9 MALIGNANT NEOPLASM OF PANCREAS, UNSPECIFIED LOCATION OF MALIGNANCY (HCC): Primary | ICD-10-CM

## 2021-05-28 VITALS
TEMPERATURE: 98.2 F | SYSTOLIC BLOOD PRESSURE: 143 MMHG | TEMPERATURE: 98.3 F | WEIGHT: 149.47 LBS | RESPIRATION RATE: 17 BRPM | OXYGEN SATURATION: 99 % | DIASTOLIC BLOOD PRESSURE: 65 MMHG | SYSTOLIC BLOOD PRESSURE: 137 MMHG | HEART RATE: 68 BPM | DIASTOLIC BLOOD PRESSURE: 66 MMHG | SYSTOLIC BLOOD PRESSURE: 158 MMHG | TEMPERATURE: 97.7 F | SYSTOLIC BLOOD PRESSURE: 130 MMHG | HEART RATE: 75 BPM | BODY MASS INDEX: 27.58 KG/M2 | OXYGEN SATURATION: 98 % | SYSTOLIC BLOOD PRESSURE: 103 MMHG | HEIGHT: 63 IN | WEIGHT: 151.9 LBS | DIASTOLIC BLOOD PRESSURE: 62 MMHG | OXYGEN SATURATION: 97 % | TEMPERATURE: 97.5 F | HEIGHT: 63 IN | DIASTOLIC BLOOD PRESSURE: 69 MMHG | TEMPERATURE: 96.8 F | OXYGEN SATURATION: 96 % | BODY MASS INDEX: 26.45 KG/M2 | BODY MASS INDEX: 26.88 KG/M2 | BODY MASS INDEX: 26.72 KG/M2 | HEIGHT: 63 IN | RESPIRATION RATE: 20 BRPM | OXYGEN SATURATION: 98 % | TEMPERATURE: 99.5 F | RESPIRATION RATE: 20 BRPM | WEIGHT: 159.39 LBS | HEART RATE: 74 BPM | DIASTOLIC BLOOD PRESSURE: 65 MMHG | HEART RATE: 81 BPM | DIASTOLIC BLOOD PRESSURE: 69 MMHG | WEIGHT: 185.63 LBS | SYSTOLIC BLOOD PRESSURE: 141 MMHG | DIASTOLIC BLOOD PRESSURE: 69 MMHG | SYSTOLIC BLOOD PRESSURE: 99 MMHG | HEART RATE: 59 BPM | TEMPERATURE: 97.7 F | OXYGEN SATURATION: 97 % | OXYGEN SATURATION: 97 % | TEMPERATURE: 97.8 F | RESPIRATION RATE: 16 BRPM | BODY MASS INDEX: 26.4 KG/M2 | HEART RATE: 72 BPM | SYSTOLIC BLOOD PRESSURE: 116 MMHG | WEIGHT: 155.65 LBS | HEART RATE: 80 BPM | OXYGEN SATURATION: 98 % | HEIGHT: 65 IN | HEART RATE: 103 BPM | SYSTOLIC BLOOD PRESSURE: 134 MMHG | RESPIRATION RATE: 18 BRPM | DIASTOLIC BLOOD PRESSURE: 61 MMHG | HEART RATE: 86 BPM | RESPIRATION RATE: 20 BRPM | BODY MASS INDEX: 30.93 KG/M2 | HEART RATE: 87 BPM | DIASTOLIC BLOOD PRESSURE: 71 MMHG | RESPIRATION RATE: 16 BRPM | TEMPERATURE: 98.2 F | WEIGHT: 156.75 LBS | WEIGHT: 151.24 LBS | OXYGEN SATURATION: 96 % | WEIGHT: 150.79 LBS | WEIGHT: 148.37 LBS | SYSTOLIC BLOOD PRESSURE: 128 MMHG | DIASTOLIC BLOOD PRESSURE: 63 MMHG | BODY MASS INDEX: 26.91 KG/M2 | BODY MASS INDEX: 26.29 KG/M2 | RESPIRATION RATE: 16 BRPM | BODY MASS INDEX: 26.44 KG/M2 | WEIGHT: 167.33 LBS | HEIGHT: 65 IN | HEIGHT: 63 IN | WEIGHT: 158.73 LBS | TEMPERATURE: 97.9 F | OXYGEN SATURATION: 100 % | OXYGEN SATURATION: 99 % | BODY MASS INDEX: 26.09 KG/M2 | RESPIRATION RATE: 20 BRPM | SYSTOLIC BLOOD PRESSURE: 145 MMHG | RESPIRATION RATE: 18 BRPM | TEMPERATURE: 97.1 F | DIASTOLIC BLOOD PRESSURE: 75 MMHG | HEART RATE: 67 BPM | BODY MASS INDEX: 28.22 KG/M2 | RESPIRATION RATE: 18 BRPM

## 2021-05-28 VITALS
HEIGHT: 63 IN | RESPIRATION RATE: 18 BRPM | DIASTOLIC BLOOD PRESSURE: 73 MMHG | TEMPERATURE: 99.2 F | DIASTOLIC BLOOD PRESSURE: 73 MMHG | WEIGHT: 148.81 LBS | OXYGEN SATURATION: 100 % | WEIGHT: 155.42 LBS | HEART RATE: 56 BPM | SYSTOLIC BLOOD PRESSURE: 173 MMHG | OXYGEN SATURATION: 99 % | BODY MASS INDEX: 25.09 KG/M2 | HEART RATE: 73 BPM | RESPIRATION RATE: 18 BRPM | TEMPERATURE: 97.7 F | BODY MASS INDEX: 26.37 KG/M2 | SYSTOLIC BLOOD PRESSURE: 153 MMHG

## 2021-05-28 VITALS
OXYGEN SATURATION: 100 % | BODY MASS INDEX: 34.69 KG/M2 | DIASTOLIC BLOOD PRESSURE: 89 MMHG | HEART RATE: 71 BPM | RESPIRATION RATE: 18 BRPM | TEMPERATURE: 97.5 F | SYSTOLIC BLOOD PRESSURE: 157 MMHG | WEIGHT: 214.95 LBS

## 2021-05-28 NOTE — PROGRESS NOTES
Patient: SAMAN DUNCAN     Acct: [id number]     Report: #[id number]  UNIT #: [id number]     : 1950    Encounter Date:2021  PRIMARY CARE: MAYA RICE  ***Signed***  --------------------------------------------------------------------------------------------------------------------  ADDENDUM: 21 1034          Addendum: KAMILA FONSECA on 3/30/21 @ 10:34      Addendum      Chart Notes      Assessment:      Pancreatic adenocarcinoma: Patient underwent Whipple procedure on 4/15/2020 (Dr. Casillas) and completed 4 cycles of chemotherapy with gemcitabine and Abraxane.     Recent CT scan 3/12/2021 shows no evidence of recurrent or metastatic disease.             Plan:      - There is no evidence of recurrence by imaging, labs or exam.      - I reviewed her CBC, CMP, and CA-19-9. which are all normal.        - Plan to continue surveillance: repeat CT CAP with contrast.      - RTC in 3 months for labs, exam and imaging.            NURSE INTAKE      Visit Type      Established Patient Visit            Chief Complaint      PANCREATIC CA            Referring Provider/Copies To      Referring Provider:  RAHEEM NEWMAN      Primary Care Provider:  RAHEEM NEWMAN      Copies To:   RAHEEM NEWMAN            History and Present Illness      Past Oncology Illness History      Ms. Duncan is a 70-year-old female with pancreatic cancer.             Presented with abdomnial pain and jaundice. 2020 patient underwent EUS at     [place] by Dr. Acevedo, pancreatic head mass, 2.8 x 2.7 cm in size as well as a     common bile duct stricture.  She had a stent placed in the common bile duct.     Biopsy was negative. The procedure was complicated by pancreatitis, mesenteric     vein thrombosis and then E coli sepsis.               Then on 3/5/2020 she had worsening abdominal pain and was readmitted to [facility] with    ascending cholangitis secondary to a blocked stent.  On 3/6/2020 the patient     underwent ERCP  with stent exchange by Dr. Clancy. CT abdomen/pelvis showed     cystic areas within the head of the pancreas that may relate to pancreatitis or     possibly branch type IPMN.             Referred to Dr. Casillas at the [facility].  She was admitted for     feeding tube placement and nutritional optimization and pain control.  Whipple     procedure on 4/15/2020.  Pathology revealed invasive moderately to poorly     differentiated adenocarcinoma in the pancreas as well as an 5 of 21 lymph nodes.     She also had perineural invasion present.  Pathologic stage was pT2pN2.              Cycle 1 of adjuvant chemotherapy with gemcitabine and Abraxane on 5/22/2020.      Cycle 1 day 8 on 5/29/2020.      Cycle 2-day 1 on 6/15/2020, started doing treatment on days 1 and 15 of a q. 28-    day cycle, C2D15 on 6/29/20      Cycle 3-day 1 on 7/13/2020      Cycle 4-day 1 on 8/10/2020            Iron deficiency anemia: Patient was treated with Injectafer on June 8 and 15.             on 3/7/20 was elevated at 273 and improved to 27.3 on 12/17/20.            CT scan on 12/16/20: No evidence of metastatic disease in the chest, abdomen, or    pelvis.  Postoperative changes from Whipple procedure are noted.      CT scan on 3/12/2021: Previous Whipple procedure. No definite evidence for     active malignancy in the chest,       abdomen, or pelvis.            HPI - Oncology Interim      Ms. Ruiz is here for his scheduled follow-up.  In the interim from last visit,     she has been doing well.  She feels well today and has no complaints.  She     denies having any fever, chills, chest pain, shortness of breath, abdominal     pain, nausea or vomiting, change in bowel or urine habits, weakness or numbness     in arms or legs.  She reports that she has a large abdominal hernia for which he    is meeting with the surgeon next week and she hopes it will get repaired.            ECOG Performance Status      1            Most  Recent Lab Findings      Laboratory Tests      3/12/21 11:08            PAST, FAMILY   Past Medical History      Past Medical History:  Arthritis, Depression, Diabetes Type 2, High Cholesterol,    Hypertension      Hematology/Oncology (F):  Pancreatic Cancer            Past Surgical History      Appendectomy, Skin Cancer Removal            HERNIA REPAIR,LEFT OVARY (Left oophrectomy due to possible early      malignancy)            Family History      Family History:  Breast Cancer, Prostate Cancer            Social History      Lives independently:  Yes      Number of Children:  1            Tobacco Use      Tobacco status:  Never smoker            Substance Use      Substance use:  Denies use            REVIEW OF SYSTEMS      General:  Denies: Appetite Change, Fatigue, Fever, Night Sweats, Weight Gain,     Weight Loss      Eye:  Denies Blurred Vision, Denies Corrective Lenses, Denies Diplopia, Denies     Vision Changes      ENT:  Denies Headache, Denies Hearing Loss, Denies Hoarseness, Denies Sore     Throat      Cardiovascular:  Denies Chest Pain, Denies Palpitations      Respiratory:  Denies: Cough, Coughing Blood, Productive Cough, Shortness of Air,    Wheezing      Gastrointestinal:  Denies Bloody Stools, Denies Constipation, Denies Diarrhea,     Denies Nausea/Vomiting, Denies Problem Swallowing, Denies Unable to Control     Bowels      Genitourinary:  Denies Blood in Urine, Denies Incontinence, Denies Painful     Urination      Musculoskeletal:  Denies Back Pain, Denies Muscle Pain, Denies Painful Joints      Integumentary:  Denies Itching, Denies Lesions, Denies Rash      Neurologic:  Denies Dizziness, Denies Numbness\Tingling, Denies Seizures      Psychiatric:  Denies Anxiety, Denies Depression      Endocrine:  Denies Cold Intolerance, Denies Heat Intolerance      Hematologic/Lymphatic:  Denies Bruising, Denies Bleeding, Denies Enlarged Lymph     Nodes      Reproductive:  Denies: Menopause, Heavy Periods,  Pregnant, Still Menstruating            VITAL SIGNS AND SCORES      Vitals      Weight 214 lbs 15.176 oz / 97.5 kg      Temperature 97.5 F / 36.39 C - Temporal      Pulse 71      Respirations 18      Blood Pressure 157/89 Sitting, Left Arm      Pulse Oximetry 100%, rm air            Pain Score      Experiencing any pain?:  No      Pain Scale Used:  Numerical      Pain Intensity:  0            Fatigue Score      Experiencing any fatigue?:  No      Fatigue (0-10 scale):  0 (none)            EXAM      General Appearance:  Positive for: Alert, Oriented x3      Eye:  Positive for: Anicteric Sclerae, PERRLA      Neck:  Positive for: Full ROM, Supple      Respiratory:  Positive for: CTAB, Normal Respiratory Effort;          Negative for: Diminished Breath, Rales      Abdomen/Gastro:  Positive for: Normal Active Bowel Sounds, Soft;          Negative for: Distention, Tenderness      Psychiatric:  Positive for: AAO X 3, Appropriate Affect      Neurologic:  Positive for: Cranial Ner II-XII Intact;          Negative for: Numbness, Weakness            PREVENTION      Hx Influenza Vaccination:  No      Date Influenza Vaccine Given:  Oct 5, 2020      Influenza Vaccine Declined:  No      2 or More Falls in Past Year?:  No      Fall Past Year with Injury?:  No      Hx Pneumococcal Vaccination:  Yes      Encouraged to follow-up with:  PCP regarding preventative exams.      Chart initiated by      LORI LONG MA            ALLERGY/MEDS      Allergies      Coded Allergies:             ADHESIVE TAPE (Verified  Allergy, Unknown, 3/26/21)           LATEX (Verified  Allergy, Unknown, ITCHING, RASH, 3/26/21)      Uncoded Allergies:             band aids (Allergy, Unknown, blisters, 5/22/20)            Medications      Last Reconciled on 1/10/21 23:14 by EMERALD MUNIZ      Colestipol HCl (Colestid) 1 Gm Tablet      1 GM PO QDAY for 30 Days, #30 TAB         Prov: ROYAL WELLS         10/27/20       Sucralfate (Carafate) 1 Gm Tab      1  GM PO ACHS, #120 TAB 0 Refills         Prov: ROYAL WELLS         10/27/20       traZODone HCl (traZODone HCl) 50 Mg Tablet      50 MG PO HS, #30 TAB 0 Refills         Reported         10/23/20       Amylase/Lipase/Protease (Creon 6*) 1 Each Capsule.      04981 UNITS PO TID MEALS for 30 Days, #270 CAP.SR 5 Refills         Prov: EMERALD MUNIZ         8/31/20       Meloxicam (Meloxicam*) 7.5 Mg Tablet      7.5 MG PO BID PRN for EDEMA/SWELLING, #30 TAB 0 Refills         Reported         6/15/20       Multivitamins W-Iron (One Daily Multivitamin) 1 Each Tablet      1 EACH PO QDAY, TAB         Reported         5/14/20       busPIRone HCl (busPIRone HCl) 10 Mg Tablet      10 MG PO BID, #60 TAB         Reported         5/14/20       Prazosin HCl (Prazosin HCl) 2 Mg Capsule      2 MG PO QDAY, #60 CAP 0 Refills         Reported         5/14/20       tiZANidine HCl (tiZANidine HCl) 2 Mg Capsule      2 MG PO BID, CAP         Reported         5/14/20       Melatonin (Melatonin) 5 Mg Tablet      5 MG PO HS PRN for SLEEP, TAB         Reported         3/5/20       Loratadine (Claritin) 10 Mg Tablet      10 MG PO QDAY, #15 TAB 0 Refills         Reported         3/5/20       Sertraline HCl (Zoloft*) 25 Mg Tablet      25 MG PO QDAY         Reported         3/5/20       Hema-Fluticasone (Fluticasone 50 mcg) 16 Gm Spray.susp      2 SPRAYS NARE EACH QDAY         Reported         3/5/20       Omeprazole (Omeprazole*) 40 Mg Capsule      40 MG PO QDAY         Reported         1/17/20       Lisinopril* (Lisinopril*) 20 Mg Tablet      20 MG PO HS, #30 TAB 0 Refills         Reported         1/17/20      Medications Reviewed:  No Changes made to meds            IMPRESSION/PLAN      Diagnosis      Pancreatic cancer - C25.9            Anemia - D64.9            Notes      New Diagnostics      * Abd/Pel W/ Cont CT, 3 Months         Dx: Pancreatic cancer - C25.9      * CBC, 3 Months         Dx: Pancreatic cancer - C25.9      * CMP Comp  Metabolic Panel, 3 Months         Dx: Pancreatic cancer - C25.9      * Ca19-9, 3 Months         Dx: Pancreatic cancer - C25.9            Patient Education      Patient Education Provided:  Yes            Electronically signed by KAMILA FONSECA  03/26/2021 09:08       Disclaimer: Converted document may not contain table formatting or lab diagrams. Please see [employer] [facility] for the authenticated document.

## 2021-05-28 NOTE — PROGRESS NOTES
Patient: SAMAN DUNCAN     Acct: [id number]     Report: #[id number]  UNIT #: [id number]     : 1950    Encounter Date:06/15/2020  PRIMARY CARE: MAYA RICE  ***Signed***  --------------------------------------------------------------------------------------------------------------------  NURSE INTAKE      Visit Type      Established Patient Visit            Chief Complaint      PANCREATIC ADENOCARCINOMA; HERE FOR TX            Referring Provider/Copies To      Referring Provider:  ROYAL WELLS      Primary Care Provider:  ROYAL WELLS      Copies To:   ROYAL WELLS            History and Present Illness      Past Oncology Illness History      Ms. Duncan is a 69-year-old female who was referred to me by Dr. Wells in     gastroenterology for possible pancreatic cancer. She states that she has been     having progressively worse symptoms since early  when she developed signi    ficant abdominal pain and decreased ability to tolerate food.  At the time     images were notable for gallstones so she underwent a cholecystectomy.  However,    the pain did not improve.  By 2020 she developed jaundice.  On 2020    patient underwent EUS at [place] by Dr. Acevedo.  She is found to have pancreatic     head mass, 2.8 x 2.7 cm in size as well as a common bile duct stricture.  She     had a stent placed in the common bile duct. The patient reports the biopsy was     negative and Dr. Acevedo plan to repeat the procedure in April.  (I have not     personally reviewed the pathology report but spoke with Dr. Wells who did     confirm that the biopsy was negative.)  The patient states the EUS and biopsy     was complicated by pancreatitis, then she developed thrombosis and some     mesenteric vessel.  (I do not have the report of this finding either.)  Then she    developed sepsis secondary to E. coli bacteremia.            Then on 3/5/2020 she had worsening abdominal pain and was  readmitted to the     hospital here at [facility].  This is when she was seen by Dr. Clancy and diagnosed     with ascending cholangitis secondary to a blocked stent.  On 3/6/2020 the     patient underwent ERCP with stent exchange.  Bile duct brushing during this     procedure was negative for malignant cells but this is a suboptimal specimen due    to artifact drying.  Culture from the purulent drainage did grow enterococcus.      The patient was successfully treated with antibiotics and discharged home.  She     spoke at length with Dr. Clancy and expressed her wish not to return to [place]     for further care.  She wishes to be treated locally and have any necessary     procedures done at the [facility].            CT abdomen/pelvis with contrast on 3/5/2020: 1) a common bile stent is noted.     There is more prominent dilitation of the intrahepatic biliary tree and common     bile duct as compared to the prior study.  2) cystic areas within the head of     the pancreas that may relate to pancreatitis or possibly branch type IPMN.  3)     right renal cyst.  Largest measuring 5.4 cm 4) atherosclerotic changes 5)     degenerative changes in the lumbar spine            MRI abdomen + MRCP on 1/17/2020: Previous cholecystectomy with significant     intra-and extrahepatic bile duct dilation.  No evidence for choledocholithiasis.     There is abrupt caliber change in the common bile duct at the level of the head    of the pancreas.  Multiple small cysts in the head of the pancreas could represe    nt sequelae of chronic pancreatitis or sidebranch IPMN.  Distal common bile duct    stricture from chronic pancreatitis or mass-effect could result in the     appearance of common bile duct.  No enhancing pancreatic mass.            The patient's was referred to Dr. Casillas at the [facility].  She     was admitted for feeding tube placement and nutritional optimization and pain     control.  Once her pain was  controlled he took her for a Whipple on 4/15/2020.      Pathology revealed invasive moderately to poorly differentiated adenocarcinoma     in the pancreas as well as an 5 of 21 lymph nodes.  She also had perineural     invasion present.  Pathologic stage was pT2pN2.  She was also found to have     significant chronic pancreatitis and pancreatic intraepithelial neoplasia.            Cycle 1 of chemotherapy with gemcitabine and Abraxane on 5/22/2020.  Cycle 1 day    8 on 5/29/2020.      Cycle 2-day 1 on 6/15/2020, started doing treatment on days 1 and 15 of a q. 28-    day cycle.            HPI - Oncology Interim      Patient comes in today prior to cycle 2-day 1 of chemotherapy with gemcitabine     and Abraxane.  She says that her nausea is now manageable with medication.  Her     appetite has improved a lot over the past week.  Her hemoglobin has also     improved significantly since getting IV iron last week and not having treatment.     Her second dose of IV iron yesterday.  Patient says she has no more heart     palpitations.  Her blood sugars also under better control since she is cut down     on boost.  She does have worsening arthritis in her feet but says she     occasionally takes meloxicam which helps.  She does complain of constipation and    we discussed over-the-counter medications.            ECOG Performance Status      1            Most Recent Lab Findings      Laboratory Tests      6/15/20 10:01            PAST, FAMILY   Past Medical History      Past Medical History:  Arthritis, Depression, Diabetes Type 2, High Cholesterol,    Hypertension      Hematology/Oncology (F):  Pancreatic Cancer            Past Surgical History      Appendectomy, Skin Cancer Removal            HERNIA REPAIR,LEFT OVARY (Left oophrectomy due to possible early      malignancy)            Family History      Family History:  Breast Cancer (AUNT)            Social History      Marital Status:        Lives independently:   Yes      Number of Children:  1            Tobacco Use      Tobacco status:  Never smoker      Currently Vaping:  No            Alcohol Use      Alcohol intake:  None            Substance Use      Substance use:  Denies use            REVIEW OF SYSTEMS      General:  Denies: Appetite Change, Fatigue, Fever, Night Sweats, Weight Gain,     Weight Loss      Eye:  Denies Blurred Vision, Denies Corrective Lenses, Denies Diplopia, Denies     Vision Changes      ENT:  Denies Headache, Denies Hearing Loss, Denies Hoarseness, Denies Sore     Throat      Cardiovascular:  Denies Chest Pain, Denies Palpitations      Respiratory:  Denies: Cough, Coughing Blood, Productive Cough, Shortness of Air,    Wheezing      Gastrointestinal:  Admits Constipation, Admits Nausea/Vomiting; Denies Bloody     Stools, Denies Diarrhea, Denies Problem Swallowing, Denies Unable to Control     Bowels      Genitourinary:  Denies Blood in Urine, Denies Incontinence, Denies Painful     Urination      Musculoskeletal:  Denies Back Pain, Denies Muscle Pain; Admits Painful Joints      Integumentary:  Denies Itching, Denies Lesions, Denies Rash      Neurologic:  Denies Dizziness, Denies Numbness\Tingling, Denies Seizures      Psychiatric:  Denies Anxiety, Denies Depression      Endocrine:  Denies Cold Intolerance, Denies Heat Intolerance      Hematologic/Lymphatic:  Denies Bruising, Denies Bleeding, Denies Enlarged Lymph     Nodes      Reproductive:  Denies: Menopause, Heavy Periods, Pregnant, Still Menstruating            VITAL SIGNS AND SCORES      Vitals      Height 5 ft 3.46 in / 161.2 cm      Weight 155 lbs 10.316 oz / 70.6 kg      BSA 1.75 m2      BMI 27.2 kg/m2      Temperature 97.5 F / 36.39 C - Temporal      Pulse 74      Respirations 18      Blood Pressure 128/61 Sitting      Pulse Oximetry 100%, ROOM AIR            Pain Score      Experiencing any pain?:  No      Pain Scale Used:  Numerical      Pain Intensity:  0            Fatigue Score       Experiencing any fatigue?:  No      Fatigue (0-10 scale):  0 (none)            PT/OT Functional Screening      Weakness            Speech Functional Screening      No needs identified            Rehab to be Ordered      Type of Referral to be Ordered:  Patient Declined            EXAM      General: Alert, cooperative, no acute distress, generally well-appearing but     thinner      Eyes: Anicteric sclera, PERRLA      HEENT: Oropharynx clear, no exudates      Respiratory: CTAB, normal respiratory effort      Abdomen: Normal active bowel sounds, no tenderness, no distention      Cardiovascular: RRR, no murmur, no peripheral edema      Skin: Normal tone, no rash, no lesions      Psychiatric: Appropriate affect, intact judgment      Neurologic: No focal sensory or motor deficits, no weakness, numbness, dizziness      Musculoskeletal: Normal muscle strength, normal muscle tone      Extremities: No clubbing, cyanosis, or deformities            PREVENTION      Hx Influenza Vaccination:  Yes      Date Influenza Vaccine Given:  Oct 2, 2019      Influenza Vaccine Declined:  No      2 or More Falls Past Year?:  No      Fall Past Year with Injury?:  No      Hx Pneumococcal Vaccination:  Yes      Encouraged to follow-up with:  PCP regarding preventative exams.      Chart initiated by      CALEB FRANCO            ALLERGY/MEDS      Allergies      Coded Allergies:             ADHESIVE TAPE (Verified  Allergy, Unknown, 5/29/20)      Uncoded Allergies:             band aids (Allergy, Unknown, blisters, 5/22/20)            Medications      Last Reconciled on 6/21/20 21:25 by EMERALD MUNIZ      Meloxicam (Meloxicam*) 7.5 Mg Tablet      7.5 MG PO BID, #30 TAB 0 Refills         Reported         6/15/20       Dronabinol (Dronabinol) 5 Mg Capsule      5 MG PO HS, #30 CAP 3 Refills         Reported         5/22/20       Lidocaine/Prilocaine (Emla) 30 Gm Cream..g.      1 APL TOPICAL ONCE, #2 GM 4 Refills         Prov:  KATIE BAIN onc         5/21/20       Lidocaine/Prilocaine (Emla) 30 Gm Cream..g.      1 APL TOPICAL ONCE, #2 GM 5 Refills         Prov: EMERALD MUNIZ         5/21/20       Prochlorperazine Maleate (Prochlorperazine Maleate) 10 Mg Tab      10 MG PO Q6H PRN for NAUSEA AND/OR VOMITING, #60 TAB 3 Refills         Prov: EMERALD MUNIZ         5/14/20       Ondansetron Odt (ONDANSETRON ODT) 8 Mg Tab.rapdis      8 MG PO Q8H PRN for NAUSEA, #30 TAB.RAPDIS 5 Refills         Prov: EMERALD MUNIZ         5/14/20       Dronabinol (Dronabinol) 5 Mg Capsule      5 MG PO BID, #60 CAP 3 Refills         Reported         5/14/20       Valerian Root (Valerian Root) 100 Mg Capsule      100 MG PO QDAY, CAP         Reported         5/14/20       Multivitamins W-Iron (One Daily Multivitamin) 1 Each Tablet      1 EACH PO QDAY, TAB         Reported         5/14/20       busPIRone HCl (busPIRone HCl) 10 Mg Tablet      10 MG PO BID, #60 TAB         Reported         5/14/20       Prazosin HCl (Prazosin HCl) 2 Mg Capsule      2 MG PO QDAY, #60 CAP 0 Refills         Reported         5/14/20       tiZANidine HCl (tiZANidine HCl) 2 Mg Capsule      2 MG PO BID, CAP         Reported         5/14/20       Sucralfate (Carafate) 1,000 Mg/10 Ml Oral.susp      1000 MG PO BID, #600 ML         Reported         5/14/20       (Creon)   No Conflict Check      1 CAP PO TID         Reported         5/14/20       Melatonin (Melatonin) 5 Mg Tablet      5 MG PO HS PRN for SLEEP, TAB         Reported         3/5/20       Loratadine (Claritin) 10 Mg Tablet      5 MG PO QDAY, #15 TAB 0 Refills         Reported         3/5/20       Sertraline HCl (Zoloft*) 25 Mg Tablet      25 MG PO QDAY         Reported         3/5/20       Hema-Fluticasone (Fluticasone 50 mcg) 16 Gm Spray.susp      2 SPRAYS NARE EACH QDAY         Reported         3/5/20       Insulin Detemir (Levemir VIAL*) 100 Unit/1 Ml Vial      10 UNITS SUBQ QAM         Reported         3/5/20        Omeprazole (Omeprazole*) 40 Mg Capsule      40 MG PO QDAY         Reported         1/17/20       Lisinopril* (Lisinopril*) 20 Mg Tablet      20 MG PO HS, #30 TAB 0 Refills         Reported         1/17/20       traZODone HCl (traZODone HCl) 100 Mg Tablet      100 MG PO HS, #30 TAB 0 Refills         Reported         11/5/18      Medications Reviewed:  Changes made to meds            IMPRESSION/PLAN      Impression      70-year-old female with pancreatic adenocarcinoma currently undergoing adjuvant     chemotherapy status post Whipple.            Diagnosis      Pancreatic cancer - C25.9            Notes      New Medications      * Meloxicam (Meloxicam*) 7.5 MG TABLET: 7.5 MG PO BID #30      New Referrals      * Diet Non Diabetic, Routine         Eboni Gannon RD         REFER TO RD         Reason for Referral: may need diabetic nutrition support         Dx: Pancreatic cancer - C25.9            Plan      Pancreatic adenocarcinoma: Patient underwent a Whipple procedure the resected.      She is here today for cycle 2-day 1 of chemotherapy with gemcitabine and     Abraxane.  We previously decided to change to every other week chemotherapy on a     28-day cycle for better tolerability.  The patient has done very well over the     last week since she was off treatment.  I reviewed her labs and they are     adequate to proceed to treatment today.            Microcytic anemia: Patient has iron deficiency anemia.  She was started on IV     iron last week and will get her second dose today.  Her hemoglobin is improved     from 8.8 up to 9.7.  Her MCV is slightly increased from 87-90.            Appetite: Patient states her appetite has significantly improved.  This could be     due to not having chemotherapy last week.  We will continue to monitor and     start her on appetite medication if needed.            Hyperglycemia: Patient is cut down on her boost supplements because of     hyperglycemia.  Since her nutrition  is very important I will contact [facility],     our nutritionist regarding recommendations.  I would like for her to remain on     nutritional supplements especially if there is a lower calorie option.            Patient Education      Patient Education Provided:  Yes            Electronically signed by EMERALD MUNIZ  06/21/2020 21:25       Disclaimer: Converted document may not contain table formatting or lab diagrams. Please see [employer] for the authenticated document.

## 2021-05-28 NOTE — PROGRESS NOTES
Patient: SAMAN DUNCAN     Acct: [id number]     Report: #[id number]  UNIT #: [id number]     : 1950    Encounter Date:2020  PRIMARY CARE: MAYA RICE  ***Signed***  --------------------------------------------------------------------------------------------------------------------  NURSE INTAKE      Visit Type      Established Patient Visit            Chief Complaint      PANCREATIC ADENOCARCINOMA (HERE FOR TX)            Referring Provider/Copies To      Referring Provider:  ROYAL WELLS      Primary Care Provider:  ROYAL WELLS      Copies To:   ROYAL WELLS            History and Present Illness      Past Oncology Illness History      Ms. Duncan is a 70-year-old female who was referred to me by Dr. Wells in     gastroenterology for possible pancreatic cancer. She states that she has been     having progressively worse symptoms since early  when she developed si    gnificant abdominal pain and decreased ability to tolerate food.  At the time     images were notable for gallstones so she underwent a cholecystectomy.  However,    the pain did not improve.  By 2020 she developed jaundice.  On 2020    patient underwent EUS at [facility] [place] by Dr. Acevedo.  She is found to have pancreatic     head mass, 2.8 x 2.7 cm in size as well as a common bile duct stricture.  She ha    d a stent placed in the common bile duct. The patient reports the biopsy was     negative and Dr. Acevedo plan to repeat the procedure in April.  (I have not     personally reviewed the pathology report but spoke with Dr. Wells who did     confirm that the biopsy was negative.)  The patient states the EUS and biopsy     was complicated by pancreatitis, then she developed thrombosis and some     mesenteric vessel.  (I do not have the report of this finding either.)  Then she    developed sepsis secondary to E. coli bacteremia.            Then on 3/5/2020 she had worsening abdominal pain and was  readmitted to the     hospital here at [facility].  This is when she was seen by Dr. Clancy and diagnosed     with ascending cholangitis secondary to a blocked stent.  On 3/6/2020 the     patient underwent ERCP with stent exchange.  Bile duct brushing during this     procedure was negative for malignant cells but this is a suboptimal specimen due    to artifact drying.  Culture from the purulent drainage did grow enterococcus.      The patient was successfully treated with antibiotics and discharged home.  She     spoke at length with Dr. Clancy and expressed her wish not to return to [place]     for further care.  She wishes to be treated locally and have any necessary     procedures done at the [facility].            CT abdomen/pelvis with contrast on 3/5/2020: 1) a common bile stent is noted.     There is more prominent dilitation of the intrahepatic biliary tree and common     bile duct as compared to the prior study.  2) cystic areas within the head of     the pancreas that may relate to pancreatitis or possibly branch type IPMN.  3)     right renal cyst.  Largest measuring 5.4 cm 4) atherosclerotic changes 5)     degenerative changes in the lumbar spine            MRI abdomen + MRCP on 1/17/2020: Previous cholecystectomy with significant     intra-and extrahepatic bile duct dilation.  No evidence for choledocholithiasis.     There is abrupt caliber change in the common bile duct at the level of the head    of the pancreas.  Multiple small cysts in the head of the pancreas could repr    esent sequelae of chronic pancreatitis or sidebranch IPMN.  Distal common bile     duct stricture from chronic pancreatitis or mass-effect could result in the     appearance of common bile duct.  No enhancing pancreatic mass.            The patient's was referred to Dr. Casillas at the [facility].  She     was admitted for feeding tube placement and nutritional optimization and pain     control.  Once her pain  was controlled he took her for a Whipple on 4/15/2020.      Pathology revealed invasive moderately to poorly differentiated adenocarcinoma     in the pancreas as well as an 5 of 21 lymph nodes.  She also had perineural     invasion present.  Pathologic stage was pT2pN2.  She was also found to have     significant chronic pancreatitis and pancreatic intraepithelial neoplasia.            Cycle 1 of chemotherapy with gemcitabine and Abraxane on 5/22/2020.  Cycle 1 day    8 on 5/29/2020.      Cycle 2-day 1 on 6/15/2020, started doing treatment on days 1 and 15 of a q. 28-    day cycle, C2D15 on 6/29/20            HPI - Oncology Interim      The pt comes in today prior to C2D15. She is tolerating chemotherapy well but     has some intermittent abdominal pain. The pain is worse after eating.  She     wonders if she should increase her creon dose.  She has some diarrhea following     the chemotherapy but that improved over the past week although she still has     pain.            ECOG Performance Status      1            Most Recent Lab Findings      Laboratory Tests      6/24/20 12:40            6/29/20 10:31            PAST, FAMILY   Past Medical History      Past Medical History:  Arthritis, Depression, Diabetes Type 2, High Cholesterol,    Hypertension      Hematology/Oncology (F):  Pancreatic Cancer            Past Surgical History      Appendectomy, Skin Cancer Removal            HERNIA REPAIR,LEFT OVARY (Left oophrectomy due to possible early      malignancy)            Family History      Family History:  Breast Cancer (AUNT)            Social History      Marital Status:        Lives independently:  Yes      Number of Children:  1            Tobacco Use      Tobacco status:  Never smoker      Currently Vaping:  No            Alcohol Use      Alcohol intake:  None            Substance Use      Substance use:  Denies use            REVIEW OF SYSTEMS      General:  Admits: Fatigue;          Denies: Appetite  Change, Fever, Night Sweats, Weight Gain, Weight Loss      Eye:  Denies Blurred Vision, Denies Corrective Lenses, Denies Diplopia, Denies     Vision Changes      ENT:  Denies Headache, Denies Hearing Loss, Denies Hoarseness, Denies Sore     Throat      Cardiovascular:  Denies Chest Pain, Denies Palpitations      Respiratory:  Denies: Cough, Coughing Blood, Productive Cough, Shortness of Air,    Wheezing      Gastrointestinal:  Admits Diarrhea; Denies Bloody Stools, Denies Constipation,     Denies Nausea/Vomiting, Denies Problem Swallowing, Denies Unable to Control     Bowels      Other      abdominal pain      Genitourinary:  Denies Blood in Urine, Denies Incontinence, Denies Painful     Urination      Musculoskeletal:  Denies Back Pain, Denies Muscle Pain, Denies Painful Joints      Integumentary:  Denies Itching, Denies Lesions, Denies Rash      Neurologic:  Denies Dizziness, Denies Numbness\Tingling, Denies Seizures      Psychiatric:  Denies Anxiety, Denies Depression      Endocrine:  Denies Cold Intolerance, Denies Heat Intolerance      Hematologic/Lymphatic:  Denies Bruising, Denies Bleeding, Denies Enlarged Lymph     Nodes      Reproductive:  Denies: Menopause, Heavy Periods, Pregnant, Still Menstruating            VITAL SIGNS AND SCORES      Vitals      Weight 151 lbs 3.769 oz / 68.6 kg      Temperature 97.9 F / 36.61 C - Temporal      Pulse 72      Respirations 18      Blood Pressure 116/62 Sitting      Pulse Oximetry 98%, ROOM AIR            Pain Score      Experiencing any pain?:  No      Pain Scale Used:  Numerical      Pain Intensity:  0            Fatigue Score      Experiencing any fatigue?:  Yes      Fatigue (0-10 scale):  2            PT/OT Functional Screening      Weakness            Speech Functional Screening      No needs identified            Rehab to be Ordered      Type of Referral to be Ordered:  Patient Declined            EXAM      General: Alert, cooperative, no acute distress      Eyes:  Anicteric sclera, PERRLA      HEENT: Oropharynx clear, no exudates      Respiratory: CTAB, normal respiratory effort      Abdomen: Normal active bowel sounds, no tenderness, no distention      Cardiovascular: RRR, no murmur, no peripheral edema      Skin: Normal tone, no rash, no lesions      Psychiatric: Appropriate affect, intact judgment      Neurologic: No focal sensory or motor deficits, no weakness, numbness, dizziness      Musculoskeletal: Normal muscle strength, normal muscle tone      Extremities: No clubbing, cyanosis, or deformities            PREVENTION      Hx Influenza Vaccination:  Yes      Date Influenza Vaccine Given:  Oct 2, 2019      Influenza Vaccine Declined:  No      2 or More Falls Past Year?:  No      Fall Past Year with Injury?:  No      Hx Pneumococcal Vaccination:  Yes      Encouraged to follow-up with:  PCP regarding preventative exams.      Chart initiated by      VARGHESE OSPINA MA            ALLERGY/MEDS      Allergies      Coded Allergies:             ADHESIVE TAPE (Verified  Allergy, Unknown, 6/29/20)      Uncoded Allergies:             band aids (Allergy, Unknown, blisters, 5/22/20)            Medications      Last Reconciled on 7/8/20 23:51 by EMERALD MUNIZ      Amylase/Lipase/Protease (Creon 6*) 1 Each Capsule.      10591 UNITS PO TID MEALS for 30 Days, #270 CAP.SR 1 Refill         Prov: EMERALD MUNIZ         6/29/20       Potassium Chloride (K-Dur*) 10 Meq Tab.er.prt      20 MEQ PO BID, #120 TAB 0 Refills         Reported         6/29/20       Meloxicam (Meloxicam*) 7.5 Mg Tablet      7.5 MG PO BID, #30 TAB 0 Refills         Reported         6/15/20       Dronabinol (Dronabinol) 5 Mg Capsule      5 MG PO HS, #30 CAP 3 Refills         Reported         5/22/20       Lidocaine/Prilocaine (Emla) 30 Gm Cream..g.      1 APL TOPICAL ONCE, #2 GM 4 Refills         Prov: KATIE BAIN onc         5/21/20       Lidocaine/Prilocaine (Emla) 30 Gm Cream..g.      1 APL TOPICAL ONCE, #2  GM 5 Refills         Prov: EMERALD MUNIZ         5/21/20       Prochlorperazine Maleate (Prochlorperazine Maleate) 10 Mg Tab      10 MG PO Q6H PRN for NAUSEA AND/OR VOMITING, #60 TAB 3 Refills         Prov: EMERALD MUNIZ         5/14/20       Ondansetron Odt (ONDANSETRON ODT) 8 Mg Tab.rapdis      8 MG PO Q8H PRN for NAUSEA, #30 TAB.RAPDIS 5 Refills         Prov: EMERALD MUNIZ         5/14/20       Dronabinol (Dronabinol) 5 Mg Capsule      5 MG PO BID, #60 CAP 3 Refills         Reported         5/14/20       Valerian Root (Valerian Root) 100 Mg Capsule      100 MG PO QDAY, CAP         Reported         5/14/20       Multivitamins W-Iron (One Daily Multivitamin) 1 Each Tablet      1 EACH PO QDAY, TAB         Reported         5/14/20       busPIRone HCl (busPIRone HCl) 10 Mg Tablet      10 MG PO BID, #60 TAB         Reported         5/14/20       Prazosin HCl (Prazosin HCl) 2 Mg Capsule      2 MG PO QDAY, #60 CAP 0 Refills         Reported         5/14/20       tiZANidine HCl (tiZANidine HCl) 2 Mg Capsule      2 MG PO BID, CAP         Reported         5/14/20       Sucralfate (Carafate) 1,000 Mg/10 Ml Oral.susp      1000 MG PO BID, #600 ML         Reported         5/14/20       (Creon)   No Conflict Check      1 CAP PO TID         Reported         5/14/20       Melatonin (Melatonin) 5 Mg Tablet      5 MG PO HS PRN for SLEEP, TAB         Reported         3/5/20       Loratadine (Claritin) 10 Mg Tablet      5 MG PO QDAY, #15 TAB 0 Refills         Reported         3/5/20       Sertraline HCl (Zoloft*) 25 Mg Tablet      25 MG PO QDAY         Reported         3/5/20       Hema-Fluticasone (Fluticasone 50 mcg) 16 Gm Spray.susp      2 SPRAYS NARE EACH QDAY         Reported         3/5/20       Insulin Detemir (Levemir VIAL*) 100 Unit/1 Ml Vial      10 UNITS SUBQ QAM         Reported         3/5/20       Omeprazole (Omeprazole*) 40 Mg Capsule      40 MG PO QDAY         Reported         1/17/20       Lisinopril* (Lisinopril*)  20 Mg Tablet      20 MG PO HS, #30 TAB 0 Refills         Reported         1/17/20       traZODone HCl (traZODone HCl) 100 Mg Tablet      100 MG PO HS, #30 TAB 0 Refills         Reported         11/5/18      Medications Reviewed:  No Changes made to meds            IMPRESSION/PLAN      Impression      71 yo F with pancreatic cancer s/p whipple getting adjuvant chemotherapy.            Diagnosis      Lung cancer - C34.90            Notes      New Medications      * Amylase/Lipase/Protease (Creon 6*) 1 EACH CAPSULE.DR: 18,000 UNITS PO TID       MEALS 30 Days #270         Instructions: 3 caps with each meal            Plan      Pancreatic cancer:  s/p whipple. Pt is here for C2 D15 of [place]/abraxane.  She is     getting chemo every other week in a regimen that is easier to tolerate than     standard dosing.  I have reviewed her labs and they are adequate to proceed.             Abdominal Pain: I spoke with her surgeon at , Dr. Casillas, who recommended     increasing her creon to 900mg (3 tabs) TID.  I also contacted Dr. Clancy to ask     him to help manage her abdominal issues locally.            Patient Education      Patient Education Provided:  Yes            Electronically signed by EMERALD MUNIZ  07/08/2020 23:51       Disclaimer: Converted document may not contain table formatting or lab diagrams. Please see [email or web address] for the authenticated document.

## 2021-05-28 NOTE — PROGRESS NOTES
Patient: SAMAN DUNCAN     Acct: [id number]     Report: #[id number]  UNIT #: [id number]     : 1950    Encounter Date:2020  PRIMARY CARE: MAYA RICE  ***Signed***  --------------------------------------------------------------------------------------------------------------------  NURSE INTAKE      Visit Type      Established Patient Visit            Chief Complaint      PANCREATIC CANCER            Referring Provider/Copies To      Primary Care Provider:  RAHEEM NEWMAN      Copies To:   RAHEEM NEWMAN            History and Present Illness      Past Oncology Illness History      Ms. Duncan is a 70-year-old female with pancreatic cancer.             Presented with abdomnial pain and jaundice. 2020 patient underwent EUS at     [place] by Dr. Acevedo, pancreatic head mass, 2.8 x 2.7 cm in size as well as a     common bile duct stricture.  She had a stent placed in the common bile duct.     Biopsy was negative. The procedure was complicated by pancreatitis, mesenteric     vein thrombosis and then E coli sepsis.               Then on 3/5/2020 she had worsening abdominal pain and was readmitted to [facility] with    ascending cholangitis secondary to a blocked stent.  On 3/6/2020 the patient     underwent ERCP with stent exchange by Dr. Clancy. CT abdomen/pelvis showed     cystic areas within the head of the pancreas that may relate to pancreatitis or     possibly branch type IPMN.             Referred to Dr. Casillas at the [facility].  She was admitted for     feeding tube placement and nutritional optimization and pain control.  Whipple     procedure on 4/15/2020.  Pathology revealed invasive moderately to poorly     differentiated adenocarcinoma in the pancreas as well as an 5 of 21 lymph nodes.     She also had perineural invasion present.  Pathologic stage was pT2pN2.              Cycle 1 of adjuvant chemotherapy with gemcitabine and Abraxane on 2020.      Cycle 1 day 8 on  5/29/2020.      Cycle 2-day 1 on 6/15/2020, started doing treatment on days 1 and 15 of a q. 28-    day cycle, C2D15 on 6/29/20      Cycle 3-day 1 on 7/13/2020      Cycle 4-day 1 on 8/10/2020            Iron deficiency anemia: Patient was treated with Injectafer on June 8 and 15.            CT scan on 9/8/2020: 1) postsurgical changes of interval Whipple procedure when     compared to prior CT from 3/5/2020.  2) small linear stent extending from the     left hepatic duct through the hepaticojejunostomy without evidence of     destruction.  3) pancreatic parenchymal atrophy without significant main duct     dilation and resection of the pancreatic head.  4) small amount of increased     density and stranding along the portal vein, superior mesenteric vein, and     superior mesenteric artery which may be postsurgical with tumor abutment felt to    be less likely but not excluded given the lack of a true nodular appearance.      Comparison with prior postop images will be helpful.  5) multiple small lymph     nodes within the upper abdomen and retroperitoneum not meeting size criteria for    pathologic enlargement.            HPI - Oncology Interim      Patient comes in today to review the results of her recent CT scan which shows     no evidence of recurrent disease.  She is currently doing well but continues the    plan of spikes in her blood pressure.  She had one episode where her blood     pressure jumped over 200 and she experienced frontal headaches.  Usually her     blood pressures running in the 170s to 180s.  Unfortunately when she was at her     primary care providers yesterday her blood pressure was 138 systolic.  He did     not change any of her medications for this reason.  She is eating well.  She has    an appoint with Dr. Ryan soon.  He will be managing her Creon.            ECOG Performance Status      1            PAST, FAMILY   Past Medical History      Past Medical History:  Arthritis, Depression,  Diabetes Type 2, High Cholesterol,    Hypertension      Hematology/Oncology (F):  Pancreatic Cancer            Past Surgical History      Appendectomy, Skin Cancer Removal            HERNIA REPAIR,LEFT OVARY (Left oophrectomy due to possible early      malignancy)            Family History      Family History:  Breast Cancer, Prostate Cancer            Social History      Lives independently:  Yes      Number of Children:  1            Tobacco Use      Tobacco status:  Never smoker            Substance Use      Substance use:  Denies use            REVIEW OF SYSTEMS      General:  Denies: Appetite Change, Fatigue, Fever, Night Sweats, Weight Gain,     Weight Loss      Eye:  Denies Blurred Vision, Denies Corrective Lenses, Denies Diplopia, Denies     Vision Changes      ENT:  Denies Headache, Denies Hearing Loss, Denies Hoarseness, Denies Sore     Throat      Cardiovascular:  Denies Chest Pain, Denies Palpitations      Respiratory:  Denies: Cough, Coughing Blood, Productive Cough, Shortness of Air,    Wheezing      Gastrointestinal:  Denies Bloody Stools, Denies Constipation, Denies Diarrhea,     Denies Nausea/Vomiting, Denies Problem Swallowing, Denies Unable to Control     Bowels      Genitourinary:  Denies Blood in Urine, Denies Incontinence, Denies Painful Urina    tion      Musculoskeletal:  Denies Back Pain, Denies Muscle Pain, Denies Painful Joints      Integumentary:  Denies Itching, Denies Lesions, Denies Rash      Neurologic:  Denies Dizziness, Denies Numbness\Tingling, Denies Seizures      Psychiatric:  Denies Anxiety, Denies Depression      Endocrine:  Denies Cold Intolerance, Denies Heat Intolerance      Hematologic/Lymphatic:  Denies Bruising, Denies Bleeding, Denies Enlarged Lymph     Nodes      Reproductive:  Denies: Menopause, Heavy Periods, Pregnant, Still Menstruating            VITAL SIGNS AND SCORES      Vitals      Height 5 ft 3.46 in / 161.2 cm      Weight 148 lbs 12.968 oz / 67.5 kg      BSA  1.71 m2      BMI 26.0 kg/m2      Temperature 97.7 F / 36.5 C - Temporal      Pulse 56      Respirations 18      Blood Pressure 173/73 Sitting, Right Arm      Pulse Oximetry 100%, ROOM AIR            Pain Score      Experiencing any pain?:  No      Pain Scale Used:  Numerical      Pain Intensity:  0            Fatigue Score      Experiencing any fatigue?:  No      Fatigue (0-10 scale):  0 (none)            EXAM      General: Alert, cooperative, no acute distress      Eyes: Anicteric sclera, PERRLA      HEENT: Oropharynx clear, no exudates      Respiratory: CTAB, normal respiratory effort      Abdomen: Normal active bowel sounds, no tenderness, no distention      Cardiovascular: RRR, no murmur, no peripheral edema      Skin: Normal tone, no rash, no lesions      Psychiatric: Appropriate affect, intact judgment      Neurologic: No focal sensory or motor deficits, no weakness, numbness, dizziness      Musculoskeletal: Normal muscle strength, normal muscle tone      Extremities: No clubbing, cyanosis, or deformities            PREVENTION      Date Influenza Vaccine Given:  Oct 2, 2019      Influenza Vaccine Declined:  No      2 or More Falls in Past Year?:  No      Fall Past Year with Injury?:  No      Hx Pneumococcal Vaccination:  Yes      Encouraged to follow-up with:  PCP regarding preventative exams.      Chart initiated by      LUISA QUINONES [facility]            ALLERGY/MEDS      Allergies      Coded Allergies:             ADHESIVE TAPE (Verified  Allergy, Unknown, 8/24/20)      Uncoded Allergies:             band aids (Allergy, Unknown, blisters, 5/22/20)            Medications      Last Reconciled on 9/15/20 22:34 by EMERALD MUNIZ      Amylase/Lipase/Protease (Creon 6*) 1 Each Capsule.      92061 UNITS PO TID MEALS for 30 Days, #270 CAP.SR 5 Refills         Prov: EMERALD MUNIZ         8/31/20       Carvedilol (Coreg) 6.25 Mg Tablet      6.25 MG PO BID, #60 TAB 3 Refills         Prov: EMERALD MUNIZ         8/24/20        Carvedilol (Carvedilol) 3.125 Mg Tablet      3.125 MG PO BID, #60 TAB 0 Refills         Prov: EMERALD MUNIZ         8/10/20       Dronabinol (Dronabinol) 5 Mg Capsule      5 MG PO BID, #60 CAP 4 Refills         Reported         7/27/20       Amylase/Lipase/Protease (Creon 6*) 1 Each Capsule.dr      15072 UNITS PO TID MEALS for 30 Days, #270 CAP.SR 1 Refill         Prov: EMERALD MUNIZ         6/29/20       Potassium Chloride (K-Dur*) 10 Meq Tab.er.prt      20 MEQ PO BID, #120 TAB 0 Refills         Reported         6/29/20       Meloxicam (Meloxicam*) 7.5 Mg Tablet      7.5 MG PO BID, #30 TAB 0 Refills         Reported         6/15/20       Dronabinol (Dronabinol) 5 Mg Capsule      5 MG PO HS, #30 CAP 3 Refills         Reported         5/22/20       Lidocaine/Prilocaine (Emla) 30 Gm Cream..g.      1 APL TOPICAL ONCE, #2 GM 5 Refills         Prov: EMERALD MUNIZ         5/21/20       Prochlorperazine Maleate (Prochlorperazine Maleate) 10 Mg Tab      10 MG PO Q6H PRN for NAUSEA AND/OR VOMITING, #60 TAB 3 Refills         Prov: EMERALD MUNIZ         5/14/20       Ondansetron Odt (ONDANSETRON ODT) 8 Mg Tab.rapdis      8 MG PO Q8H PRN for NAUSEA, #30 TAB.RAPDIS 5 Refills         Prov: EMERALD MUNIZ         5/14/20       Dronabinol (Dronabinol) 5 Mg Capsule      5 MG PO BID, #60 CAP 3 Refills         Reported         5/14/20       Valerian Root (Valerian Root) 100 Mg Capsule      100 MG PO QDAY, CAP         Reported         5/14/20       Multivitamins W-Iron (One Daily Multivitamin) 1 Each Tablet      1 EACH PO QDAY, TAB         Reported         5/14/20       busPIRone HCl (busPIRone HCl) 10 Mg Tablet      10 MG PO BID, #60 TAB         Reported         5/14/20       Prazosin HCl (Prazosin HCl) 2 Mg Capsule      2 MG PO QDAY, #60 CAP 0 Refills         Reported         5/14/20       tiZANidine HCl (tiZANidine HCl) 2 Mg Capsule      2 MG PO BID, CAP         Reported         5/14/20       Sucralfate (Carafate) 1,000  Mg/10 Ml Oral.susp      1000 MG PO BID, #600 ML         Reported         5/14/20       Melatonin (Melatonin) 5 Mg Tablet      5 MG PO HS PRN for SLEEP, TAB         Reported         3/5/20       Loratadine (Claritin) 10 Mg Tablet      5 MG PO QDAY, #15 TAB 0 Refills         Reported         3/5/20       Sertraline HCl (Zoloft*) 25 Mg Tablet      25 MG PO QDAY         Reported         3/5/20       Hema-Fluticasone (Fluticasone 50 mcg) 16 Gm Spray.susp      2 SPRAYS NARE EACH QDAY         Reported         3/5/20       Insulin Detemir (Levemir VIAL*) 100 Unit/1 Ml Vial      10 UNITS SUBQ QAM         Reported         3/5/20       Omeprazole (Omeprazole*) 40 Mg Capsule      40 MG PO QDAY         Reported         1/17/20       Lisinopril* (Lisinopril*) 20 Mg Tablet      20 MG PO HS, #30 TAB 0 Refills         Reported         1/17/20       traZODone HCl (traZODone HCl) 100 Mg Tablet      100 MG PO HS, #30 TAB 0 Refills         Reported         11/5/18      Medications Reviewed:  No Changes made to meds            IMPRESSION/PLAN      Impression      70-year-old female with a history of pancreatic adenocarcinoma status post     Whipple and adjuvant chemotherapy.            Diagnosis      Pancreatic cancer - C25.9            Notes      New Diagnostics      * CT Abd/Pelvis/Chest W/Contrast, 3 Months         Dx: Pancreatic cancer - C25.9            Plan      Pancreatic adenocarcinoma: Patient underwent Whipple procedure on 4/15/2020 and     completed 4 cycles of chemotherapy with gemcitabine and Abraxane.  Recent CT     scan shows no evidence of recurrent disease.  She will follow-up with me in 3     months with another repeat CT CAP with contrast.  At that time I will also     recheck labs with a CBC, CMP, and CA-19-9.  I discussed the recent results with     her surgeon Dr. Casillas.            Hypertension: Patient reports having episodes of blood pressure spikes with     headaches.  I recommend she double the dose of Coreg  to 12.4 mg twice a day.  If    she tolerates this well then I will refill the medication to reflect this dose.            Patient Education      Patient Education Provided:  Yes            Electronically signed by EMERALD MUNIZ  09/15/2020 22:34       Disclaimer: Converted document may not contain table formatting or lab diagrams. Please see [employer] for the authenticated document.

## 2021-05-28 NOTE — PROGRESS NOTES
Patient: SAMAN DUNCAN     Acct: [id number]     Report: #[id number]  UNIT #: [id number]     : 1950    Encounter Date:2020  PRIMARY CARE: MAYA RICE  ***Signed***  --------------------------------------------------------------------------------------------------------------------  NURSE INTAKE      Visit Type      Established Patient Visit            Chief Complaint      PANCREATIC CANCER            Referring Provider/Copies To      Primary Care Provider:  RAHEEM NEWMAN      Copies To:   RAHEEM NEWMAN            History and Present Illness      Past Oncology Illness History      Ms. Duncan is a 70-year-old female with pancreatic cancer.             Presented with abdomnial pain and jaundice. 2020 patient underwent EUS at     [place] by Dr. Acevedo, pancreatic head mass, 2.8 x 2.7 cm in size as well as a     common bile duct stricture.  She had a stent placed in the common bile duct.     Biopsy was negative. The procedure was complicated by pancreatitis, mesenteric     vein thrombosis and then E coli sepsis.               Then on 3/5/2020 she had worsening abdominal pain and was readmitted to [facility] with    ascending cholangitis secondary to a blocked stent.  On 3/6/2020 the patient     underwent ERCP with stent exchange by Dr. Clancy. CT abdomen/pelvis showed     cystic areas within the head of the pancreas that may relate to pancreatitis or     possibly branch type IPMN.             Referred to Dr. Casillas at the [facility].  She was admitted for     feeding tube placement and nutritional optimization and pain control.  Whipple     procedure on 4/15/2020.  Pathology revealed invasive moderately to poorly     differentiated adenocarcinoma in the pancreas as well as an 5 of 21 lymph nodes.     She also had perineural invasion present.  Pathologic stage was pT2pN2.              Cycle 1 of adjuvant chemotherapy with gemcitabine and Abraxane on 2020.      Cycle 1 day 8 on  5/29/2020.      Cycle 2-day 1 on 6/15/2020, started doing treatment on days 1 and 15 of a q. 28-    day cycle, C2D15 on 6/29/20      Cycle 3-day 1 on 7/13/2020      Cycle 4-day 1 on 8/10/2020            Iron deficiency anemia: Patient was treated with Injectafer on June 8 and 15.             on 3/7/20 was elevated at 273 and improved to 27.3 on 12/17/20.            CT scan on 12/16/20: No evidence of metastatic disease in the chest, abdomen, or    pelvis.  Postoperative changes from Whipple procedure are noted.            HPI - Oncology Interim      Patient comes in today for follow-up after her recent CT scan.  Fortunately this    shows no evidence of metastatic disease.  She is feeling well.  She continues do    see Dr. Clancy for management of her Creon.  He recently performed an EGD and     colonoscopy which was negative.  She does continue do have some abdominal pain     but it is tolerable.            ECOG Performance Status      1            PAST, FAMILY   Past Medical History      Past Medical History:  Arthritis, Depression, Diabetes Type 2, High Cholesterol,    Hypertension      Hematology/Oncology (F):  Pancreatic Cancer            Past Surgical History      Appendectomy, Skin Cancer Removal            HERNIA REPAIR,LEFT OVARY (Left oophrectomy due to possible early      malignancy)            Family History      Family History:  Breast Cancer, Prostate Cancer            Social History      Lives independently:  Yes      Number of Children:  1            Tobacco Use      Tobacco status:  Never smoker            Substance Use      Substance use:  Denies use            REVIEW OF SYSTEMS      General:  Admits: Fatigue;          Denies: Appetite Change, Fever, Night Sweats, Weight Gain, Weight Loss      Eye:  Denies Blurred Vision, Denies Corrective Lenses, Denies Diplopia, Denies     Vision Changes      ENT:  Denies Headache, Denies Hearing Loss, Denies Hoarseness, Denies Sore     Throat       Cardiovascular:  Denies Chest Pain, Denies Palpitations      Respiratory:  Denies: Cough, Coughing Blood, Productive Cough, Shortness of Air,    Wheezing      Gastrointestinal:  Denies Bloody Stools, Denies Constipation, Denies Diarrhea,     Denies Nausea/Vomiting, Denies Problem Swallowing, Denies Unable to Control     Bowels      Genitourinary:  Denies Blood in Urine, Denies Incontinence, Denies Painful     Urination      Musculoskeletal:  Denies Back Pain, Denies Muscle Pain, Denies Painful Joints      Integumentary:  Denies Itching, Denies Lesions, Denies Rash      Neurologic:  Denies Dizziness, Denies Numbness\Tingling, Denies Seizures      Psychiatric:  Denies Anxiety, Denies Depression      Endocrine:  Denies Cold Intolerance, Denies Heat Intolerance      Hematologic/Lymphatic:  Denies Bruising, Denies Bleeding, Denies Enlarged Lymph     Nodes      Reproductive:  Denies: Menopause, Heavy Periods, Pregnant, Still Menstruating            VITAL SIGNS AND SCORES      Vitals      Weight 155 lbs 6.789 oz / 70.5 kg      Temperature 99.2 F / 37.33 C - Temporal      Pulse 73      Respirations 18      Blood Pressure 153/73 Sitting, Left Arm      Pulse Oximetry 99%, ROOM AIR            Pain Score      Experiencing any pain?:  Yes      Pain Scale Used:  Numerical      Pain Intensity:  5            Fatigue Score      Experiencing any fatigue?:  Yes      Fatigue (0-10 scale):  6            EXAM      General: Alert, cooperative, no acute distress, well appearing      Eyes: Anicteric sclera, PERRLA      Respiratory: CTAB, normal respiratory effort      Abdomen: Normal active bowel sounds, no tenderness, no distention      Cardiovascular: RRR, no murmur, no lower extremity edema      Skin: Normal tone, no rash, no lesions      Psychiatric: Appropriate affect, intact judgment      Neurologic: No focal sensory or motor deficits, no weakness, numbness, dizziness      Musculoskeletal: Normal muscle strength and tone       Extremities: No clubbing, cyanosis, or deformities            PREVENTION      Date Influenza Vaccine Given:  Oct 2, 2019      Influenza Vaccine Declined:  No      2 or More Falls in Past Year?:  No      Fall Past Year with Injury?:  No      Hx Pneumococcal Vaccination:  Yes      Encouraged to follow-up with:  PCP regarding preventative exams.      Chart initiated by      LUISA FRANCO            ALLERGY/MEDS      Allergies      Coded Allergies:             ADHESIVE TAPE (Verified  Allergy, Unknown, 12/17/20)           LATEX (Verified  Allergy, Unknown, ITCHING, RASH, 12/17/20)      Uncoded Allergies:             band aids (Allergy, Unknown, blisters, 5/22/20)            Medications      Last Reconciled on 1/10/21 23:14 by EMERALD MUNIZ      Colestipol HCl (Colestid) 1 Gm Tablet      1 GM PO QDAY for 30 Days, #30 TAB         Prov: ROYAL WELLS         10/27/20       Sucralfate (Carafate) 1 Gm Tab      1 GM PO ACHS, #120 TAB 0 Refills         Prov: ROYAL WELLS         10/27/20       traZODone HCl (traZODone HCl) 50 Mg Tablet      50 MG PO HS, #30 TAB 0 Refills         Reported         10/23/20       Amylase/Lipase/Protease (Creon 6*) 1 Each Capsule.      81457 UNITS PO TID MEALS for 30 Days, #270 CAP.SR 5 Refills         Prov: EMERALD MUNIZ         8/31/20       Meloxicam (Meloxicam*) 7.5 Mg Tablet      7.5 MG PO BID PRN for EDEMA/SWELLING, #30 TAB 0 Refills         Reported         6/15/20       Multivitamins W-Iron (One Daily Multivitamin) 1 Each Tablet      1 EACH PO QDAY, TAB         Reported         5/14/20       busPIRone HCl (busPIRone HCl) 10 Mg Tablet      10 MG PO BID, #60 TAB         Reported         5/14/20       Prazosin HCl (Prazosin HCl) 2 Mg Capsule      2 MG PO QDAY, #60 CAP 0 Refills         Reported         5/14/20       tiZANidine HCl (tiZANidine HCl) 2 Mg Capsule      2 MG PO BID, CAP         Reported         5/14/20       Melatonin (Melatonin) 5 Mg Tablet      5 MG PO HS  PRN for SLEEP, TAB         Reported         3/5/20       Loratadine (Claritin) 10 Mg Tablet      10 MG PO QDAY, #15 TAB 0 Refills         Reported         3/5/20       Sertraline HCl (Zoloft*) 25 Mg Tablet      25 MG PO QDAY         Reported         3/5/20       Hema-Fluticasone (Fluticasone 50 mcg) 16 Gm Spray.susp      2 SPRAYS NARE EACH QDAY         Reported         3/5/20       Omeprazole (Omeprazole*) 40 Mg Capsule      40 MG PO QDAY         Reported         1/17/20       Lisinopril* (Lisinopril*) 20 Mg Tablet      20 MG PO HS, #30 TAB 0 Refills         Reported         1/17/20      Medications Reviewed:  No Changes made to meds            IMPRESSION/PLAN      Diagnosis      Pancreatic cancer - C25.9            Notes      New Diagnostics      * CT Abd/Pelvis/Chest W/Contrast, 3 Months         Dx: Pancreatic cancer - C25.9      * Ca19-9, 3 Months         Dx: Pancreatic cancer - C25.9      * CBC, 3 Months         Dx: Pancreatic cancer - C25.9      * CMP Comp Metabolic Panel, 3 Months         Dx: Pancreatic cancer - C25.9      * CBC, Routine         Dx: Pancreatic cancer - C25.9      * CMP Comp Metabolic Panel, Routine         Dx: Pancreatic cancer - C25.9      * Ca19-9, Routine         Dx: Pancreatic cancer - C25.9            Plan      Pancreatic adenocarcinoma: Patient underwent Whipple procedure on 4/15/2020 (Dr. Casillas) and completed 4 cycles of chemotherapy with gemcitabine and Abraxane.     Recent CT scan shows no evidence of recurrent or metastatic disease. I reviewed     her CBC, CMP, and CA-19-9. which are all normal.  She will follow-up with me in     3 months with another repeat CT CAP with contrast. We will repeat labs at that     time as well.            Patient Education      Patient Education Provided:  Yes            Electronically signed by EMERALD MUNIZ  01/10/2021 23:14       Disclaimer: Converted document may not contain table formatting or lab diagrams. Please see [employer] for the authenticated document.

## 2021-05-28 NOTE — PROGRESS NOTES
Patient: SAMAN DUNCAN     Acct: [id number]     Report: #[id number]  UNIT #: [id number]     : 1950    Encounter Date:2020  PRIMARY CARE: MAYA RICE  ***Signed***  --------------------------------------------------------------------------------------------------------------------  NURSE INTAKE      Visit Type      Established Patient Visit            Chief Complaint      PANCREATIC CA HERE FOR TX            Referring Provider/Copies To      Referring Provider:  ROYAL WELLS      Primary Care Provider:  ROYAL WELLS            History and Present Illness      Past Oncology Illness History      Ms. Duncan is a 70-year-old female who was referred to me by Dr. Wells in     gastroenterology for possible pancreatic cancer. She states that she has been     having progressively worse symptoms since early  when she developed     significant abdominal pain and decreased ability to tolerate food.  At the time     images were notable for gallstones so she underwent a cholecystectomy.  However,    the pain did not improve.  By 2020 she developed jaundice.  On 2020    patient underwent EUS at [facility] by Dr. Acevedo.  She is found to have pancreatic     head mass, 2.8 x 2.7 cm in size as well as a common bile duct stricture.  She     had a stent placed in the common bile duct. The patient reports the biopsy was     negative and Dr. Acevedo plan to repeat the procedure in April.  (I have not     personally reviewed the pathology report but spoke with Dr. Wells who did     confirm that the biopsy was negative.)  The patient states the EUS and biopsy     was complicated by pancreatitis, then she developed thrombosis and some     mesenteric vessel.  (I do not have the report of this finding either.)  Then she    developed sepsis secondary to E. coli bacteremia.            Then on 3/5/2020 she had worsening abdominal pain and was readmitted to the     hospital here at [facility].  This is  when she was seen by Dr. Clancy and diagnosed     with ascending cholangitis secondary to a blocked stent.  On 3/6/2020 the     patient underwent ERCP with stent exchange.  Bile duct brushing during this     procedure was negative for malignant cells but this is a suboptimal specimen due    to artifact drying.  Culture from the purulent drainage did grow enterococcus.      The patient was successfully treated with antibiotics and discharged home.  She     spoke at length with Dr. Clancy and expressed her wish not to return to [place]     for further care.  She wishes to be treated locally and have any necessary     procedures done at the [facility].            CT abdomen/pelvis with contrast on 3/5/2020: 1) a common bile stent is noted.     There is more prominent dilitation of the intrahepatic biliary tree and common     bile duct as compared to the prior study.  2) cystic areas within the head of     the pancreas that may relate to pancreatitis or possibly branch type IPMN.  3)     right renal cyst.  Largest measuring 5.4 cm 4) atherosclerotic changes 5)     degenerative changes in the lumbar spine            MRI abdomen + MRCP on 1/17/2020: Previous cholecystectomy with significant     intra-and extrahepatic bile duct dilation.  No evidence for choledocholithiasis.     There is abrupt caliber change in the common bile duct at the level of the head    of the pancreas.  Multiple small cysts in the head of the pancreas could     represent sequelae of chronic pancreatitis or sidebranch IPMN.  Distal common     bile duct stricture from chronic pancreatitis or mass-effect could result in the    appearance of common bile duct.  No enhancing pancreatic mass.            The patient's was referred to Dr. Casillas at the [facility].  She     was admitted for feeding tube placement and nutritional optimization and pain     control.  Once her pain was controlled he took her for a Whipple on 4/15/2020.       Pathology revealed invasive moderately to poorly differentiated adenocarcinoma     in the pancreas as well as an 5 of 21 lymph nodes.  She also had perineural     invasion present.  Pathologic stage was pT2pN2.  She was also found to have     significant chronic pancreatitis and pancreatic intraepithelial neoplasia.            Cycle 1 of chemotherapy with gemcitabine and Abraxane on 5/22/2020.  Cycle 1 day    8 on 5/29/2020.      Cycle 2-day 1 on 6/15/2020, started doing treatment on days 1 and 15 of a q. 28-    day cycle, C2D15 on 6/29/20      Cycle 3-day 1 on 7/13/2020            Iron deficiency anemia: Patient was treated with Injectafer on June 8 and 15.            HPI - Oncology Interim      Patient comes in today prior to cycle 3.  She says she feels fairly well and her    abdominal pain resolved after she increase the dose of Creon.  She would like to    see Dr. Clancy for additional recommendations.  She also says she feels well     after doses of Injectafer.  She has somewhat more energy.            Her only complaint is worsening vision.  She feels that things are somewhat more    blurry.  She denies floaters or double vision.            ECOG Performance Status      1            Most Recent Lab Findings      Laboratory Tests      6/29/20 10:31            6/29/20 11:02            PAST, FAMILY   Past Medical History      Past Medical History:  Arthritis, Depression, Diabetes Type 2, High Cholesterol,    Hypertension      Hematology/Oncology (F):  Pancreatic Cancer            Past Surgical History      Appendectomy, Skin Cancer Removal            HERNIA REPAIR,LEFT OVARY (Left oophrectomy due to possible early      malignancy)            Family History      Family History:  Breast Cancer ([facility]UNT)            Social History      Marital Status:        Lives independently:  Yes      Number of Children:  1            Tobacco Use      Tobacco status:  Never smoker      Currently Vaping:  No             Alcohol Use      Alcohol intake:  None            Substance Use      Substance use:  Denies use            REVIEW OF SYSTEMS      General:  Admits: Fatigue;          Denies: Appetite Change, Fever, Night Sweats, Weight Gain, Weight Loss      Eye:  Admits Vision Changes; Denies Blurred Vision, Denies Corrective Lenses,     Denies Diplopia      ENT:  Denies Headache, Denies Hearing Loss, Denies Hoarseness, Denies Sore     Throat      Cardiovascular:  Denies Chest Pain, Denies Palpitations      Respiratory:  Denies: Cough, Coughing Blood, Productive Cough, Shortness of Air,    Wheezing      Gastrointestinal:  Denies Bloody Stools, Denies Constipation, Denies Diarrhea,     Denies Nausea/Vomiting, Denies Problem Swallowing, Denies Unable to Control     Bowels      Genitourinary:  Denies Blood in Urine, Denies Incontinence, Denies Painful     Urination      Musculoskeletal:  Denies Back Pain, Denies Muscle Pain, Denies Painful Joints      Integumentary:  Denies Itching, Denies Lesions, Denies Rash      Neurologic:  Denies Dizziness, Denies Numbness\Tingling, Denies Seizures      Psychiatric:  Denies Anxiety, Denies Depression      Endocrine:  Denies Cold Intolerance, Denies Heat Intolerance      Hematologic/Lymphatic:  Denies Bruising, Denies Bleeding, Denies Enlarged Lymph     Nodes      Reproductive:  Denies: Menopause, Heavy Periods, Pregnant, Still Menstruating            VITAL SIGNS AND SCORES      Vitals      Height 5 ft 3.46 in / 161.2 cm      Weight 150 lbs 12.714 oz / 68.4 kg      BSA 1.72 m2      BMI 26.3 kg/m2      Temperature 97.8 F / 36.56 C - Temporal      Pulse 75      Respirations 18      Blood Pressure 103/66 Sitting      Pulse Oximetry 97%, ROOM AIR            Pain Score      Experiencing any pain?:  No      Pain Scale Used:  Numerical      Pain Intensity:  0            Fatigue Score      Experiencing any fatigue?:  Yes      Fatigue (0-10 scale):  2            PT/OT Functional Screening      Weakness             Speech Functional Screening      No needs identified            Rehab to be Ordered      Type of Referral to be Ordered:  Patient Declined            EXAM      General: Alert, cooperative, no acute distress      Eyes: Anicteric sclera, PERRLA      HEENT: Oropharynx clear, no exudates      Respiratory: CTAB, normal respiratory effort      Abdomen: Normal active bowel sounds, no tenderness, no distention      Cardiovascular: RRR, no murmur, no peripheral edema      Skin: Normal tone, no rash, no lesions      Psychiatric: Appropriate affect, intact judgment      Neurologic: No focal sensory or motor deficits, no weakness, numbness, dizziness      Musculoskeletal: Normal muscle strength, normal muscle tone      Extremities: No clubbing, cyanosis, or deformities            PREVENTION      Hx Influenza Vaccination:  Yes      Date Influenza Vaccine Given:  Oct 2, 2019      Influenza Vaccine Declined:  No      2 or More Falls in Past Year?:  No      Fall Past Year with Injury?:  No      Hx Pneumococcal Vaccination:  Yes      Encouraged to follow-up with:  PCP regarding preventative exams.      Chart initiated by      CALEB FRANCO            ALLERGY/MEDS      Allergies      Coded Allergies:             ADHESIVE TAPE (Verified  Allergy, Unknown, 7/13/20)      Uncoded Allergies:             band aids (Allergy, Unknown, blisters, 5/22/20)            Medications      Last Reconciled on 7/13/20 22:18 by EMERALD MUNIZ      Amylase/Lipase/Protease (Creon 6*) 1 Each Capsule.      29895 UNITS PO TID MEALS for 30 Days, #270 CAP.SR 1 Refill         Prov: EMERALD MUNIZ         6/29/20       Potassium Chloride (K-Dur*) 10 Meq Tab.er.prt      20 MEQ PO BID, #120 TAB 0 Refills         Reported         6/29/20       Meloxicam (Meloxicam*) 7.5 Mg Tablet      7.5 MG PO BID, #30 TAB 0 Refills         Reported         6/15/20       Dronabinol (Dronabinol) 5 Mg Capsule      5 MG PO HS, #30 CAP 3 Refills         Reported          5/22/20       Lidocaine/Prilocaine (Emla) 30 Gm Cream..g.      1 APL TOPICAL ONCE, #2 GM 5 Refills         Prov: EMERALD MUNIZ         5/21/20       Prochlorperazine Maleate (Prochlorperazine Maleate) 10 Mg Tab      10 MG PO Q6H PRN for NAUSEA AND/OR VOMITING, #60 TAB 3 Refills         Prov: EMERALD MUNIZ         5/14/20       Ondansetron Odt (ONDANSETRON ODT) 8 Mg Tab.rapdis      8 MG PO Q8H PRN for NAUSEA, #30 TAB.RAPDIS 5 Refills         Prov: EMERALD MUNIZ         5/14/20       Dronabinol (Dronabinol) 5 Mg Capsule      5 MG PO BID, #60 CAP 3 Refills         Reported         5/14/20       Valerian Root (Valerian Root) 100 Mg Capsule      100 MG PO QDAY, CAP         Reported         5/14/20       Multivitamins W-Iron (One Daily Multivitamin) 1 Each Tablet      1 EACH PO QDAY, TAB         Reported         5/14/20       busPIRone HCl (busPIRone HCl) 10 Mg Tablet      10 MG PO BID, #60 TAB         Reported         5/14/20       Prazosin HCl (Prazosin HCl) 2 Mg Capsule      2 MG PO QDAY, #60 CAP 0 Refills         Reported         5/14/20       tiZANidine HCl (tiZANidine HCl) 2 Mg Capsule      2 MG PO BID, CAP         Reported         5/14/20       Sucralfate (Carafate) 1,000 Mg/10 Ml Oral.susp      1000 MG PO BID, #600 ML         Reported         5/14/20       Melatonin (Melatonin) 5 Mg Tablet      5 MG PO HS PRN for SLEEP, TAB         Reported         3/5/20       Loratadine (Claritin) 10 Mg Tablet      5 MG PO QDAY, #15 TAB 0 Refills         Reported         3/5/20       Sertraline HCl (Zoloft*) 25 Mg Tablet      25 MG PO QDAY         Reported         3/5/20       Hema-Fluticasone (Fluticasone 50 mcg) 16 Gm Spray.susp      2 SPRAYS NARE EACH QDAY         Reported         3/5/20       Insulin Detemir (Levemir VIAL*) 100 Unit/1 Ml Vial      10 UNITS SUBQ QAM         Reported         3/5/20       Omeprazole (Omeprazole*) 40 Mg Capsule      40 MG PO QDAY         Reported         1/17/20       Lisinopril* (Lisinopril*)  20 Mg Tablet      20 MG PO HS, #30 TAB 0 Refills         Reported         1/17/20       traZODone HCl (traZODone HCl) 100 Mg Tablet      100 MG PO HS, #30 TAB 0 Refills         Reported         11/5/18      Medications Reviewed:  No Changes made to meds            IMPRESSION/PLAN      Impression      70-year-old female with history of pancreatic cancer status post Whipple here     for adjuvant chemotherapy.            Plan      Pancreatic cancer: Status post Whipple.  Patient is here today for cycle 3-day 1    of Gemzar Abraxane.  I reviewed her labs and they are adequate to proceed to     treatment.             SJ: s/p IV iron in June.  Hgb improved from 8.8 to 10.6.            Patient Education      Patient Education Provided:  Yes            Electronically signed by EMERALD MUNIZ  07/13/2020 22:18       Disclaimer: Converted document may not contain table formatting or lab diagrams. Please see Book[employer]Table System for the authenticated document.

## 2021-05-28 NOTE — PROGRESS NOTES
Patient: SAMAN DUNCAN     Acct: [id number]     Report: #[id number]  UNIT #: [id number]     : 1950    Encounter Date:2020  PRIMARY CARE: MAYA RICE  ***Signed***  --------------------------------------------------------------------------------------------------------------------  NURSE INTAKE      Visit Type      Established Patient Visit            Chief Complaint      PANCREATIC ADENOCARCINOMA (HERE FOR TX)            Referring Provider/Copies To      Referring Provider:  ROYAL WELLS      Primary Care Provider:  ROYAL WELLS      Copies To:   ROYAL WELLS            History and Present Illness      Past Oncology Illness History      Ms. Duncan is a 70-year-old female with pancreatic cancer.             Presented with abdomnial pain and jaundice. 2020 patient underwent EUS at     [place] by Dr. Acevedo, pancreatic head mass, 2.8 x 2.7 cm in size as well as a     common bile duct stricture.  She had a stent placed in the common bile duct.     Biopsy was negative. The procedure was complicated by pancreatitis, mesenteric     vein thrombosis and then E coli sepsis.               Then on 3/5/2020 she had worsening abdominal pain and was readmitted to [facility] with    ascending cholangitis secondary to a blocked stent.  On 3/6/2020 the patient     underwent ERCP with stent exchange by Dr. Wells. CT abdomen/pelvis showed     cystic areas within the head of the pancreas that may relate to pancreatitis or     possibly branch type IPMN.             Referred to Dr. Casillas at the [facility].  She was admitted for     feeding tube placement and nutritional optimization and pain control.  Whipple     procedure on 4/15/2020.  Pathology revealed invasive moderately to poorly     differentiated adenocarcinoma in the pancreas as well as an 5 of 21 lymph nodes.     She also had perineural invasion present.  Pathologic stage was pT2pN2.              Cycle 1 of adjuvant  chemotherapy with gemcitabine and Abraxane on 5/22/2020.  C    ycle 1 day 8 on 5/29/2020.      Cycle 2-day 1 on 6/15/2020, started doing treatment on days 1 and 15 of a q. 28-    day cycle, C2D15 on 6/29/20      Cycle 3-day 1 on 7/13/2020            Iron deficiency anemia: Patient was treated with Injectafer on June 8 and 15.            HPI - Oncology Interim      Patient comes in today for cycle 3-day 15 of chemotherapy with gemcitabine and     Abraxane.  She continues to tolerate treatment well.  She occasionally has some     lower abdominal pain but is doing much better since increasing the dose of     Creon.  She has not yet spoken with Dr. Clancy about follow-up and management of    creon.  She feels she is eating much better with Marinol and would like a     refill.            We discussed genetic testing for the first time.  I explained to the patient     that genetic testing is recommended for any patient diagnosed with pancreatic     cancer due to the rarity of the disease.  Her family history is significant for     brother with prostate cancer and a paternal aunt with breast cancer.  She also     had a maternal uncle with an unknown type of cancer.            ECOG Performance Status      1            Most Recent Lab Findings      Laboratory Tests      7/13/20 09:58            7/13/20 10:14            PAST, FAMILY   Past Medical History      Past Medical History:  Arthritis, Depression, Diabetes Type 2, High Cholesterol,    Hypertension      Hematology/Oncology (F):  Pancreatic Cancer            Past Surgical History      Appendectomy, Skin Cancer Removal            HERNIA REPAIR,LEFT OVARY (Left oophrectomy due to possible early      malignancy)            Family History      Family History:  Breast Cancer (AUNT), Prostate Cancer (Brother)            Social History      Marital Status:        Lives independently:  Yes      Number of Children:  1            Tobacco Use      Tobacco status:  Never  smoker      Currently Vaping:  No            Alcohol Use      Alcohol intake:  None            Substance Use      Substance use:  Denies use            REVIEW OF SYSTEMS      General:  Admits: Fatigue;          Denies: Appetite Change, Fever, Night Sweats, Weight Gain, Weight Loss      Eye:  Denies Blurred Vision, Denies Corrective Lenses, Denies Diplopia, Denies     Vision Changes      ENT:  Denies Headache, Denies Hearing Loss, Denies Hoarseness, Denies Sore     Throat      Cardiovascular:  Denies Chest Pain, Denies Palpitations      Respiratory:  Denies: Cough, Coughing Blood, Productive Cough, Shortness of Air,    Wheezing      Gastrointestinal:  Denies Bloody Stools, Denies Constipation, Denies Diarrhea,     Denies Nausea/Vomiting, Denies Problem Swallowing, Denies Unable to Control     Bowels      Genitourinary:  Denies Blood in Urine, Denies Incontinence, Denies Painful     Urination      Musculoskeletal:  Denies Back Pain, Denies Muscle Pain, Denies Painful Joints      Integumentary:  Denies Itching, Denies Lesions, Denies Rash      Neurologic:  Denies Dizziness, Denies Numbness\Tingling, Denies Seizures      Psychiatric:  Denies Anxiety, Denies Depression      Endocrine:  Denies Cold Intolerance, Denies Heat Intolerance      Hematologic/Lymphatic:  Denies Bruising, Denies Bleeding, Denies Enlarged Lymph     Nodes      Reproductive:  Denies: Menopause, Heavy Periods, Pregnant, Still Menstruating            VITAL SIGNS AND SCORES      Vitals      Weight 149 lbs 7.550 oz / 67.8 kg      Temperature 98.3 F / 36.83 C - Temporal      Pulse 67      Respirations 16      Blood Pressure 130/63 Sitting      Pulse Oximetry 96%, ROOM AIR            Pain Score      Experiencing any pain?:  No      Pain Scale Used:  Numerical      Pain Intensity:  0            Fatigue Score      Experiencing any fatigue?:  Yes      Fatigue (0-10 scale):  3            PT/OT Functional Screening      Weakness            Speech Functional  Screening      No needs identified            Rehab to be Ordered      Type of Referral to be Ordered:  Patient Declined            EXAM      Other      General: Alert, cooperative, no acute distress, well-appearing      Eyes: Anicteric sclera, PERRLA      HEENT: Oropharynx clear, no exudates      Respiratory: CTAB, normal respiratory effort      Abdomen: Normal active bowel sounds, no tenderness, no distention      Cardiovascular: RRR, no murmur, no peripheral edema      Skin: Normal tone, no rash, no lesions      Psychiatric: Appropriate affect, intact judgment      Neurologic: No focal sensory or motor deficits, no weakness, numbness, dizziness      Musculoskeletal: Normal muscle strength, normal muscle tone      Extremities: No clubbing, cyanosis, or deformities            PREVENTION      Hx Influenza Vaccination:  Yes      Date Influenza Vaccine Given:  Oct 2, 2019      Influenza Vaccine Declined:  No      2 or More Falls in Past Year?:  No      Fall Past Year with Injury?:  No      Hx Pneumococcal Vaccination:  Yes      Encouraged to follow-up with:  PCP regarding preventative exams.      Chart initiated by      VARGHESE OSPINA MA            ALLERGY/MEDS      Allergies      Coded Allergies:             ADHESIVE TAPE (Verified  Allergy, Unknown, 7/27/20)      Uncoded Allergies:             band aids (Allergy, Unknown, blisters, 5/22/20)            Medications      Last Reconciled on 8/2/20 12:35 by EMERALD MUNIZ      Dronabinol (Dronabinol) 5 Mg Capsule      5 MG PO BID, #60 CAP 4 Refills         Reported         7/27/20       Amylase/Lipase/Protease (Creon 6*) 1 Each Capsule.      09154 UNITS PO TID MEALS for 30 Days, #270 CAP.SR 1 Refill         Prov: EMERALD MUNIZ         6/29/20       Potassium Chloride (K-Dur*) 10 Meq Tab.er.prt      20 MEQ PO BID, #120 TAB 0 Refills         Reported         6/29/20       Meloxicam (Meloxicam*) 7.5 Mg Tablet      7.5 MG PO BID, #30 TAB 0 Refills         Reported          6/15/20       Dronabinol (Dronabinol) 5 Mg Capsule      5 MG PO HS, #30 CAP 3 Refills         Reported         5/22/20       Lidocaine/Prilocaine (Emla) 30 Gm Cream..g.      1 APL TOPICAL ONCE, #2 GM 5 Refills         Prov: EMERALD MUNIZ         5/21/20       Prochlorperazine Maleate (Prochlorperazine Maleate) 10 Mg Tab      10 MG PO Q6H PRN for NAUSEA AND/OR VOMITING, #60 TAB 3 Refills         Prov: EMERALD MUNIZ         5/14/20       Ondansetron Odt (ONDANSETRON ODT) 8 Mg Tab.rapdis      8 MG PO Q8H PRN for NAUSEA, #30 TAB.RAPDIS 5 Refills         Prov: EMERALD MUNIZ         5/14/20       Dronabinol (Dronabinol) 5 Mg Capsule      5 MG PO BID, #60 CAP 3 Refills         Reported         5/14/20       Valerian Root (Valerian Root) 100 Mg Capsule      100 MG PO QDAY, CAP         Reported         5/14/20       Multivitamins W-Iron (One Daily Multivitamin) 1 Each Tablet      1 EACH PO QDAY, TAB         Reported         5/14/20       busPIRone HCl (busPIRone HCl) 10 Mg Tablet      10 MG PO BID, #60 TAB         Reported         5/14/20       Prazosin HCl (Prazosin HCl) 2 Mg Capsule      2 MG PO QDAY, #60 CAP 0 Refills         Reported         5/14/20       tiZANidine HCl (tiZANidine HCl) 2 Mg Capsule      2 MG PO BID, CAP         Reported         5/14/20       Sucralfate (Carafate) 1,000 Mg/10 Ml Oral.susp      1000 MG PO BID, #600 ML         Reported         5/14/20       Melatonin (Melatonin) 5 Mg Tablet      5 MG PO HS PRN for SLEEP, TAB         Reported         3/5/20       Loratadine (Claritin) 10 Mg Tablet      5 MG PO QDAY, #15 TAB 0 Refills         Reported         3/5/20       Sertraline HCl (Zoloft*) 25 Mg Tablet      25 MG PO QDAY         Reported         3/5/20       Hema-Fluticasone (Fluticasone 50 mcg) 16 Gm Spray.susp      2 SPRAYS NARE EACH QDAY         Reported         3/5/20       Insulin Detemir (Levemir VIAL*) 100 Unit/1 Ml Vial      10 UNITS SUBQ QAM         Reported         3/5/20       Omeprazole  (Omeprazole*) 40 Mg Capsule      40 MG PO QDAY         Reported         1/17/20       Lisinopril* (Lisinopril*) 20 Mg Tablet      20 MG PO HS, #30 TAB 0 Refills         Reported         1/17/20       traZODone HCl (traZODone HCl) 100 Mg Tablet      100 MG PO HS, #30 TAB 0 Refills         Reported         11/5/18      Medications Reviewed:  No Changes made to meds            IMPRESSION/PLAN      Impression      70-year-old female with pancreatic cancer status post Whipple procedure     currently getting adjuvant chemotherapy.            Diagnosis      Pancreatic cancer - C25.9            Notes      New Medications      * Dronabinol 5 MG CAPSULE: 5 MG PO BID #60         Instructions: 1 CAP      New Diagnostics      * CMP Comp Metabolic Panel, Routine         Dx: Pancreatic cancer - C25.9      New Referrals      * Genetic Counseling, As Soon As Possible         [facility] [place]         Dx: Pancreatic cancer - C25.9            Plan      Pancreatic cancer: Status post Whipple procedure.  Here today for cycle 3-day 15    of chemotherapy with gemcitabine (800 mg per metered squared) and Abraxane (100     mg per metered squared).  Her treatment is on days 1 and 15 of a 21-day cycle.      Patient return to clinic for cycle 4-day 1.            Poor appetite malnutrition: Patient has been eating better while on Marinol.  I     will refill that prescription.  Her abdominal pain has also improved since     increasing her dose of Creon.  After speaking with her surgeon, I relates that     her dose of Creon could be increased.  I will contact Dr. Clancy for management.                 Genetics: Current guidelines recommend genetic testing for any patient diagnosed    with pancreatic cancer.  Furthermore the patient has a history of prostate     cancer in her brother.  Also not genetic testing for pancreatic cancer related     mutations.  The patient will follow-up here for the results.            Patient  Education      Patient Education Provided:  Yes            Electronically signed by EMERALD MUNIZ  08/02/2020 12:35       Disclaimer: Converted document may not contain table formatting or lab diagrams. Please see [employer] for the authenticated document.

## 2021-05-28 NOTE — PROGRESS NOTES
Patient: SAMAN DUNCNA     Acct: [id number]     Report: #[id number]  UNIT #: [id number]     : 1950    Encounter Date:08/10/2020  PRIMARY CARE: MAYA RICE  ***Signed***  --------------------------------------------------------------------------------------------------------------------  NURSE INTAKE      Visit Type      Established Patient Visit            Chief Complaint      PANCREATIC CA HERE FOR TX            Referring Provider/Copies To      Referring Provider:  ROYAL WELLS      Primary Care Provider:  ROYAL WELLS            History and Present Illness      Past Oncology Illness History      Ms. Duncan is a 70-year-old female with pancreatic cancer.             Presented with abdomnial pain and jaundice. 2020 patient underwent EUS at     [place] by Dr. Acevedo, pancreatic head mass, 2.8 x 2.7 cm in size as well as a     common bile duct stricture.  She had a stent placed in the common bile duct.     Biopsy was negative. The procedure was complicated by pancreatitis, mesenteric     vein thrombosis and then E coli sepsis.               Then on 3/5/2020 she had worsening abdominal pain and was readmitted to [facility] with    ascending cholangitis secondary to a blocked stent.  On 3/6/2020 the patient     underwent ERCP with stent exchange by Dr. Wells. CT abdomen/pelvis showed     cystic areas within the head of the pancreas that may relate to pancreatitis or     possibly branch type IPMN.             Referred to Dr. Casillas at the [facility].  She was admitted for     feeding tube placement and nutritional optimization and pain control.  Whipple     procedure on 4/15/2020.  Pathology revealed invasive moderately to poorly dif    ferentiated adenocarcinoma in the pancreas as well as an 5 of 21 lymph nodes.      She also had perineural invasion present.  Pathologic stage was pT2pN2.              Cycle 1 of adjuvant chemotherapy with gemcitabine and Abraxane on  "5/22/2020.      Cycle 1 day 8 on 5/29/2020.      Cycle 2-day 1 on 6/15/2020, started doing treatment on days 1 and 15 of a q. 28-    day cycle, C2D15 on 6/29/20      Cycle 3-day 1 on 7/13/2020      Cycle 4-day 1 on 8/10/2020            Iron deficiency anemia: Patient was treated with Injectafer on June 8 and 15.            HPI - Oncology Interim      Patient comes in today for her fourth cycle of chemotherapy.  She says she is     tolerating chemotherapy well but has had increased blood pressures over the last    several evenings.  Her blood pressure is started to run in the 170s and is     associated with a \"fuzzy head\" and feeling lousy.  Her appetite has been great     since she has been on Marinol.  She says that she has been eating bigger meals     and now takes 4 tablets of Creon before this.  It has helped significantly with     her abdominal pain.            ECOG Performance Status      1            Most Recent Lab Findings      Laboratory Tests      8/10/20 09:33            8/10/20 09:45            PAST, FAMILY   Past Medical History      Past Medical History:  Arthritis, Depression, Diabetes Type 2, High Cholesterol,    Hypertension      Hematology/Oncology (F):  Pancreatic Cancer            Past Surgical History      Appendectomy, Skin Cancer Removal            HERNIA REPAIR,LEFT OVARY (Left oophrectomy due to possible early      malignancy)            Family History      Family History:  Breast Cancer (AUNT), Prostate Cancer (Brother)            Social History      Marital Status:        Lives independently:  Yes      Number of Children:  1            Tobacco Use      Tobacco status:  Never smoker      Currently Vaping:  No            Alcohol Use      Alcohol intake:  None            Substance Use      Substance use:  Denies use            REVIEW OF SYSTEMS      General:  Admits: Fatigue;          Denies: Appetite Change, Fever, Night Sweats, Weight Gain, Weight Loss      Eye:  Denies Blurred " Vision, Denies Corrective Lenses, Denies Diplopia, Denies     Vision Changes      ENT:  Denies Headache, Denies Hearing Loss, Denies Hoarseness, Denies Sore     Throat      Cardiovascular:  Denies Chest Pain, Denies Palpitations      Respiratory:  Denies: Cough, Coughing Blood, Productive Cough, Shortness of Air,    Wheezing      Gastrointestinal:  Denies Bloody Stools, Denies Constipation, Denies Diarrhea,     Denies Nausea/Vomiting, Denies Problem Swallowing, Denies Unable to Control     Bowels      Genitourinary:  Denies Blood in Urine, Denies Incontinence, Denies Painful     Urination      Musculoskeletal:  Denies Back Pain, Denies Muscle Pain, Denies Painful Joints      Integumentary:  Denies Itching, Denies Lesions, Denies Rash      Neurologic:  Denies Dizziness, Denies Numbness\Tingling, Denies Seizures      Psychiatric:  Denies Anxiety, Denies Depression      Endocrine:  Denies Cold Intolerance, Denies Heat Intolerance      Hematologic/Lymphatic:  Denies Bruising, Denies Bleeding, Denies Enlarged Lymph     Nodes      Reproductive:  Denies: Menopause, Heavy Periods, Pregnant, Still Menstruating            VITAL SIGNS AND SCORES      Vitals      Height 5 ft 3.46 in / 161.2 cm      Weight 148 lbs 5.913 oz / 67.3 kg      BSA 1.71 m2      BMI 25.9 kg/m2      Temperature 97.1 F / 36.17 C - Temporal      Pulse 68      Respirations 16      Blood Pressure 141/69 Sitting      Pulse Oximetry 96%, ROOM AIR            Pain Score      Experiencing any pain?:  No      Pain Scale Used:  Numerical      Pain Intensity:  0            Fatigue Score      Experiencing any fatigue?:  Yes      Fatigue (0-10 scale):  2            PT/OT Functional Screening      Weakness            Speech Functional Screening      No needs identified            Rehab to be Ordered      Type of Referral to be Ordered:  Patient Declined            EXAM      General: Alert, cooperative, no acute distress, thin in the face but her weight     appears  stable      Eyes: Anicteric sclera, PERRLA      HEENT: Oropharynx clear, no exudates      Respiratory: CTAB, normal respiratory effort      Abdomen: Normal active bowel sounds, no tenderness, no distention      Cardiovascular: RRR, no murmur, no peripheral edema      Skin: Normal tone, no rash, no lesions      Psychiatric: Appropriate affect, intact judgment      Neurologic: No focal sensory or motor deficits, no weakness, numbness, dizziness      Musculoskeletal: Normal muscle strength, normal muscle tone      Extremities: No clubbing, cyanosis, or deformities            PREVENTION      Hx Influenza Vaccination:  Yes      Date Influenza Vaccine Given:  Oct 2, 2019      Influenza Vaccine Declined:  No      2 or More Falls in Past Year?:  No      Fall Past Year with Injury?:  No      Hx Pneumococcal Vaccination:  Yes      Encouraged to follow-up with:  PCP regarding preventative exams.      Chart initiated by      CALEB FRANCO            ALLERGY/MEDS      Allergies      Coded Allergies:             ADHESIVE TAPE (Verified  Allergy, Unknown, 7/27/20)      Uncoded Allergies:             band aids (Allergy, Unknown, blisters, 5/22/20)            Medications      Last Reconciled on 8/10/20 16:59 by EMERALD MUNIZ      Carvedilol (Carvedilol) 3.125 Mg Tablet      3.125 MG PO BID, #60 TAB 0 Refills         Prov: EMERALD MUNIZ         8/10/20       Dronabinol (Dronabinol) 5 Mg Capsule      5 MG PO BID, #60 CAP 4 Refills         Reported         7/27/20       Amylase/Lipase/Protease (Creon 6*) 1 Each Capsule.dr      04957 UNITS PO TID MEALS for 30 Days, #270 CAP.SR 1 Refill         Prov: EMERALD MUNIZ         6/29/20       Potassium Chloride (K-Dur*) 10 Meq Tab.er.prt      20 MEQ PO BID, #120 TAB 0 Refills         Reported         6/29/20       Meloxicam (Meloxicam*) 7.5 Mg Tablet      7.5 MG PO BID, #30 TAB 0 Refills         Reported         6/15/20       Dronabinol (Dronabinol) 5 Mg Capsule      5 MG PO HS, #30 CAP 3  Refills         Reported         5/22/20       Lidocaine/Prilocaine (Emla) 30 Gm Cream..g.      1 APL TOPICAL ONCE, #2 GM 5 Refills         Prov: EMERALD MUNIZ         5/21/20       Prochlorperazine Maleate (Prochlorperazine Maleate) 10 Mg Tab      10 MG PO Q6H PRN for NAUSEA AND/OR VOMITING, #60 TAB 3 Refills         Prov: EMERALD MUNIZ         5/14/20       Ondansetron Odt (ONDANSETRON ODT) 8 Mg Tab.rapdis      8 MG PO Q8H PRN for NAUSEA, #30 TAB.RAPDIS 5 Refills         Prov: EMERALD MUNIZ         5/14/20       Dronabinol (Dronabinol) 5 Mg Capsule      5 MG PO BID, #60 CAP 3 Refills         Reported         5/14/20       Valerian Root (Valerian Root) 100 Mg Capsule      100 MG PO QDAY, CAP         Reported         5/14/20       Multivitamins W-Iron (One Daily Multivitamin) 1 Each Tablet      1 EACH PO QDAY, TAB         Reported         5/14/20       busPIRone HCl (busPIRone HCl) 10 Mg Tablet      10 MG PO BID, #60 TAB         Reported         5/14/20       Prazosin HCl (Prazosin HCl) 2 Mg Capsule      2 MG PO QDAY, #60 CAP 0 Refills         Reported         5/14/20       tiZANidine HCl (tiZANidine HCl) 2 Mg Capsule      2 MG PO BID, CAP         Reported         5/14/20       Sucralfate (Carafate) 1,000 Mg/10 Ml Oral.susp      1000 MG PO BID, #600 ML         Reported         5/14/20       Melatonin (Melatonin) 5 Mg Tablet      5 MG PO HS PRN for SLEEP, TAB         Reported         3/5/20       Loratadine (Claritin) 10 Mg Tablet      5 MG PO QDAY, #15 TAB 0 Refills         Reported         3/5/20       Sertraline HCl (Zoloft*) 25 Mg Tablet      25 MG PO QDAY         Reported         3/5/20       Hema-Fluticasone (Fluticasone 50 mcg) 16 Gm Spray.susp      2 SPRAYS NARE EACH QDAY         Reported         3/5/20       Insulin Detemir (Levemir VIAL*) 100 Unit/1 Ml Vial      10 UNITS SUBQ QAM         Reported         3/5/20       Omeprazole (Omeprazole*) 40 Mg Capsule      40 MG PO QDAY         Reported          1/17/20       Lisinopril* (Lisinopril*) 20 Mg Tablet      20 MG PO HS, #30 TAB 0 Refills         Reported         1/17/20       traZODone HCl (traZODone HCl) 100 Mg Tablet      100 MG PO HS, #30 TAB 0 Refills         Reported         11/5/18      Medications Reviewed:  No Changes made to meds            IMPRESSION/PLAN      Impression      70-year-old female with history of pancreatic cancer here for her fourth cycle     of chemotherapy.            Diagnosis      Notes      New Medications      * Carvedilol 3.125 MG TABLET: 3.125 MG PO BID #60            Plan      Pancreatic cancer: Patient underwent a Whipple procedure at the time of     diagnosis.  She is here today for her fourth cycle of chemotherapy with     gemcitabine (800 mg per metered squared) and Abraxane 100 mg per metered     squared).  Her treatment is on days 1 and 15 of a 21-day cycle.              Hypertension: Patient is currently on lisinopril every morning.  She does not     have any other blood pressure medications.  She said that she is try to get into    her primary care provider but has a difficult time right now getting a call     back.  I agreed to start her on Coreg 3.1 mg twice daily.  I will follow-up with    her at the next appointment and we will see if we need to adjust upward.            Malnutrition: Patient's weight is now stable and she is eating very well while     on Marinol.  I contacted Dr. Ryan who will help manage her Creon.  She is     currently taking 3 tablets with most meals but for with larger meals.  She is     doing very well with this regimen and having reduced abdominal pain.            Genetics: The patient has her genetics appointment today after chemotherapy     infusion.            Patient Education      Patient Education Provided:  Yes            Electronically signed by EMERADL MUNIZ  08/10/2020 16:59       Disclaimer: Converted document may not contain table formatting or lab diagrams. Please see [facility] for the authenticated document.

## 2021-05-28 NOTE — PROGRESS NOTES
Patient: SAMAN DUNCAN     Acct: [id number]     Report: #[id number]  UNIT #: [id number]     : 1950    Encounter Date:2020  PRIMARY CARE: MAYA RICE  ***Signed***  --------------------------------------------------------------------------------------------------------------------  NURSE INTAKE      Visit Type      Established Patient Visit            Chief Complaint      PANCREATIC CANCER            Referring Provider/Copies To      Referring Provider:  ROYAL WELLS      Primary Care Provider:  A            History and Present Illness      Past Oncology Illness History      Ms. Duncan is a 69-year-old female who was referred to me by Dr. Wells in     gastroenterology for possible pancreatic cancer. She was in clinic today with     her daughter.  She states that she has been having progressively worse symptoms     since early  when she developed significant abdominal pain and decreased     ability to tolerate food.  At the time images were notable for gallstones so she    underwent a cholecystectomy.  However, the pain did not improve.  By 2020 she developed jaundice.  On 2020 patient underwent EUS at [place] by     Dr. Acevedo.  She is found to have pancreatic head mass, 2.8 x 2.7 cm in size as     well as a common bile duct stricture.  She had a stent placed in the common bile    duct. The patient reports the biopsy was negative and Dr. Acevedo plan to repeat     the procedure in April.  (I have not personally reviewed the pathology report     but spoke with Dr. Wells who did confirm that the biopsy was negative.)  The     patient states the EUS and biopsy was complicated by pancreatitis, then she     developed thrombosis and some mesenteric vessel.  (I do not have the report of     this finding either.)  Then she developed sepsis secondary to E. coli     bacteremia.            Then on 3/5/2020 she had worsening abdominal pain and was readmitted to the      hospital here at [facility].  This is when she was seen by Dr. Clancy and diagnosed     with ascending cholangitis secondary to a blocked stent.  On 3/6/2020 the patie    nt underwent ERCP with stent exchange.  Bile duct brushing during this procedure    was negative for malignant cells but this is a suboptimal specimen due to     artifact drying.  Culture from the purulent drainage did grow enterococcus.  The    patient was successfully treated with antibiotics and discharged home.  She     spoke at length with Dr. Clancy and expressed her wish not to return to [place]     for further care.  She wishes to be treated locally and have any necessary     procedures done at the [facility].            CT abdomen/pelvis with contrast on 3/5/2020: 1) a common bile stent is noted.     There is more prominent dilitation of the intrahepatic biliary tree and common     bile duct as compared to the prior study.  2) cystic areas within the head of     the pancreas that may relate to pancreatitis or possibly branch type IPMN.  3)     right renal cyst.  Largest measuring 5.4 cm 4) atherosclerotic changes 5)     degenerative changes in the lumbar spine            MRI abdomen + MRCP on 1/17/2020: Previous cholecystectomy with significant intra    -and extrahepatic bile duct dilation.  No evidence for choledocholithiasis.      There is abrupt caliber change in the common bile duct at the level of the head     of the pancreas.  Multiple small cysts in the head of the pancreas could     represent sequelae of chronic pancreatitis or sidebranch IPMN.  Distal common     bile duct stricture from chronic pancreatitis or mass-effect could result in the    appearance of common bile duct.  No enhancing pancreatic mass.            The patient's was referred to Dr. Casillas at the [facility].  She     was admitted for feeding tube placement and nutritional optimization and pain     control.            HPI - Oncology Interim       The patient comes in today for follow-up and lab check after feeding tube     placement.  She feels better now and her pain is greatly reduced except for some    dull aching across both sides of her abdomen.  She also feels stronger and is     able to take care of herself again.              She is concerned that her glucose numbers have been running higher.  We     discussed reasons for this including the fact that she has restarted tube feeds.     Her insulin is managed by her primary care provider, Dr. Singh.  During our     conversation the patient's daughter was on speaker phone and told her that her     PCP had recommended she take Levemir 10 units twice daily.  Instead, the patient    was only taking it nightly as she was unaware of the correct instructions.            ECOG Performance Status      1            PAST, FAMILY   Past Medical History      Past Medical History:  Arthritis, Depression, Diabetes Type 2, High Cholesterol,    Hypertension      Hematology/Oncology (F):  Pancreatic Cancer            Past Surgical History      Appendectomy, Skin Cancer Removal            HERNIA REPAIR,LEFT OVARY (Left oophrectomy due to possible early      malignancy)            Family History      Family History:  Breast Cancer (AUNT)            Social History      Marital Status:        Lives independently:  Yes      Number of Children:  1            Tobacco Use      Tobacco status:  Never smoker      Currently Vaping:  No            Alcohol Use      Alcohol intake:  None            Substance Use      Substance use:  Denies use            REVIEW OF SYSTEMS      General:  Admits: Fatigue, Weight Loss;          Denies: Appetite Change, Fever, Night Sweats, Weight Gain      Eye:  Denies Blurred Vision, Denies Corrective Lenses, Denies Diplopia, Denies     Vision Changes      ENT:  Denies Headache, Denies Hearing Loss, Denies Hoarseness, Denies Sore     Throat      Cardiovascular:  Denies Chest Pain, Denies  Palpitations      Respiratory:  Denies: Coughing Blood, Productive Cough, Shortness of Air,     Wheezing      Gastrointestinal:  Admits Nausea/Vomiting; Denies Bloody Stools, Denies     Constipation, Denies Diarrhea, Denies Problem Swallowing, Denies Unable to     Control Bowels      Other      dull abdominal pain across both sides of her abdomen      Genitourinary:  Denies Blood in Urine, Denies Incontinence, Denies Painful     Urination      Musculoskeletal:  Denies Back Pain, Denies Muscle Pain, Denies Painful Joints      Integumentary:  Denies Itching, Denies Lesions, Denies Rash      Neurologic:  Denies Dizziness, Denies Numbness\Tingling, Denies Seizures      Psychiatric:  Denies Anxiety, Denies Depression      Endocrine:  Denies Cold Intolerance, Denies Heat Intolerance      Hematologic/Lymphatic:  Denies Bruising, Denies Bleeding, Denies Enlarged Lymph     Nodes      Reproductive:  Denies: Menopause, Heavy Periods, Pregnant, Still Menstruating            VITAL SIGNS AND SCORES      Vitals      Weight 167 lbs 5.266 oz / 75.9 kg      Temperature 97.7 F / 36.5 C - Temporal      Pulse 80      Respirations 16      Blood Pressure 143/69 Sitting, Left Arm      Pulse Oximetry 98%, ROOM AIR            Pain Score      Experiencing any pain?:  No      Pain Scale Used:  Numerical      Pain Intensity:  0            Fatigue Score      Experiencing any fatigue?:  No      Fatigue (0-10 scale):  0 (none)            PT/OT Functional Screening      Weakness            Speech Functional Screening      No needs identified            Rehab to be Ordered      Type of Referral to be Ordered:  Patient Declined            EXAM      General: Alert, cooperative, no acute distress, appears more strong and well     appearing, out of the wheelchair      Eyes: Anicteric sclera, PERRLA      Respiratory: CTAB, normal respiratory effort      Abdomen: Normal active bowel sounds, mild tenderness diffusely, no distention      Cardiovascular:  RRR, no murmur, no peripheral edema      Skin: Normal tone, no rash, no lesions      Psychiatric: Appropriate affect, intact judgment      Neurologic: No focal sensory or motor deficits, no weakness, numbness, dizziness      Musculoskeletal: Normal muscle strength, normal muscle tone      Extremities: No clubbing, cyanosis, or deformities            PREVENTION      Hx Influenza Vaccination:  Yes      Date Influenza Vaccine Given:  Oct 2, 2019      Influenza Vaccine Declined:  No      2 or More Falls Past Year?:  No      Fall Past Year with Injury?:  No      Hx Pneumococcal Vaccination:  No      Encouraged to follow-up with:  PCP regarding preventative exams.      Chart initiated by      LUISA QUINONES [facility]            ALLERGY/MEDS      Allergies      Coded Allergies:             ADHESIVE TAPE (Verified  Allergy, Unknown, 4/2/20)            Medications      Last Reconciled on 4/8/20 11:48 by EMERALD MUNIZ      Magnesium Oxide (Magnesium Oxide) 500 Mg Tablet      500 MG PO QDAY, TAB         Reported         3/5/20       Melatonin (Melatonin) 5 Mg Tablet      5 MG PO HS PRN for SLEEP, TAB         Reported         3/5/20       Loratadine (Claritin) 10 Mg Tablet      5 MG PO QDAY, #15 TAB 0 Refills         Reported         3/5/20       Sertraline HCl (Zoloft*) 25 Mg Tablet      25 MG PO QDAY         Reported         3/5/20       Hema-Fluticasone (Fluticasone 50 mcg) 16 Gm Spray.susp      2 SPRAYS NARE EACH QDAY         Reported         3/5/20       Insulin Detemir (Levemir VIAL*) 100 Unit/1 Ml Vial      10 UNITS SUBQ QAM         Reported         3/5/20       Omeprazole (Omeprazole*) 40 Mg Capsule      40 MG PO QDAY         Reported         1/17/20       Lisinopril* (Lisinopril*) 20 Mg Tablet      20 MG PO HS, #30 TAB 0 Refills         Reported         1/17/20       traZODone HCl (traZODone HCl) 100 Mg Tablet      100 MG PO HS, #30 TAB 0 Refills         Reported         11/5/18      Medications Reviewed:  Changes made to  meds            IMPRESSION/PLAN      Impression      68 yo F with pancreatic head lesions concerning for IPMN vs pancreatic cancer.            Diagnosis      Notes      Discontinued Medications      * AMOXICILLIN/CLAVULANATE K (Augmentin 875/125 Mg) 1 EACH TABLET: 875 MG PO BID       14 Days #28            Plan      Pancreatic Head Lesions: concerning for IPMN vs pancreatic adenocarcinoma.  Pt     was referred to Dr. Casillas at .  He and I have been in communication     regarding her treatment plan. She is following up with me today for repeat labs.     Tentative date for her Whipple procedure is April 15, 2020.  I will make a     tentative follow-up appointment for the patient around the middle of May since     she should be about 4 weeks post surgery at that time.  If the appointment needs    to be adjusted to ensure that she has closer follow-up with them we will do that    at the instruction of her surgeon.            Hyperglycemia: Patient's glucose has been in the 200s to 300s recently since     restarting tube feeds.  Her insulin is managed by Dr. Mayfield, her primary care     provider.  She is on Levemir 10 units nightly.  However, the patient was     supposed to be on 10 units twice daily and was unaware until her daughter told     her during this visit.  Patient was instructed to start 10 units twice daily for    3 days and then contact Dr. Mayfield for further recommendations.            Abdominal pain: Patient had a history of recent pancreatitis.  Lipase today was     190 which is improved from 249 on 3/5/2020.  Patient's abdominal pain has also     improved which suggest she no longer has acute pancreatitis.            Malnutrition: Patient's nutrition has significantly increased since she was     admitted to the hospital at  and a feeding tube was placed.  She is getting     tube feeds 22 hours a day and her energy has significantly improved.  Her     prealbumin was checked in clinic today and is  24 (normal range 20-40).            Patient Education      Patient Education Provided:  Yes            Electronically signed by EMERALD MUNIZ  04/08/2020 11:48       Disclaimer: Converted document may not contain table formatting or lab diagrams. Please see [facility] for the authenticated document.

## 2021-05-28 NOTE — PROGRESS NOTES
Patient: SAMAN DUNCAN     Acct: [id number]     Report: #[id number]  UNIT #: [id number]     : 1950    Encounter Date:2020  PRIMARY CARE: MAYA RICE  ***Signed***  --------------------------------------------------------------------------------------------------------------------  NURSE INTAKE      Visit Type      Established Patient Visit            Chief Complaint      PANCREATIC CANCER            Referring Provider/Copies To      Referring Provider:  ROYAL WELLS      Primary Care Provider:  ROYAL WELLS      Copies To:   ROYAL WELLS            History and Present Illness      Past Oncology Illness History      Ms. Duncan is a 69-year-old female who was referred to me by Dr. Wells in     gastroenterology for possible pancreatic cancer. She was in clinic today with     her daughter.  She states that she has been having progressively worse symptoms     since early  when she developed significant abdominal pain and decreased     ability to tolerate food.  At the time images were notable for gallstones so she    underwent a cholecystectomy.  However, the pain did not improve.  By 2020 she developed jaundice.  On 2020 patient underwent EUS at [place] by     Dr. Acevedo.  She is found to have pancreatic head mass, 2.8 x 2.7 cm in size as     well as a common bile duct stricture.  She had a stent placed in the common bile    duct. The patient reports the biopsy was negative and Dr. Acevedo plan to repeat     the procedure in April.  (I have not personally reviewed the pathology report     but spoke with Dr. Wells who did confirm that the biopsy was negative.)  The     patient states the EUS and biopsy was complicated by pancreatitis, then she     developed thrombosis and some mesenteric vessel.  (I do not have the report of     this finding either.)  Then she developed sepsis secondary to E. coli     bacteremia.            Then on 3/5/2020 she had  worsening abdominal pain and was readmitted to the     hospital here at [facility].  This is when she was seen by Dr. Clancy and diagnosed     with ascending cholangitis secondary to a blocked stent.  On 3/6/2020 the     patient underwent ERCP with stent exchange.  Bile duct brushing during this     procedure was negative for malignant cells but this is a suboptimal specimen due    to artifact drying.  Culture from the purulent drainage did grow enterococcus.      The patient was successfully treated with antibiotics and discharged home.  She     spoke at length with Dr. Clancy and expressed her wish not to return to [place]     for further care.  She wishes to be treated locally and have any necessary     procedures done at the [facility].            CT abdomen/pelvis with contrast on 3/5/2020: 1) a common bile stent is noted.     There is more prominent dilitation of the intrahepatic biliary tree and common     bile duct as compared to the prior study.  2) cystic areas within the head of     the pancreas that may relate to pancreatitis or possibly branch type IPMN.  3)     right renal cyst.  Largest measuring 5.4 cm 4) atherosclerotic changes 5)     degenerative changes in the lumbar spine            MRI abdomen + MRCP on 1/17/2020: Previous cholecystectomy with significant     intra-and extrahepatic bile duct dilation.  No evidence for choledocholithiasis.     There is abrupt caliber change in the common bile duct at the level of the head    of the pancreas.  Multiple small cysts in the head of the pancreas could     represent sequelae of chronic pancreatitis or sidebranch IPMN.  Distal common     bile duct stricture from chronic pancreatitis or mass-effect could result in the    appearance of common bile duct.  No enhancing pancreatic mass.            The patient's was referred to Dr. Casillas at the [facility].  She     was admitted for feeding tube placement and nutritional optimization and  pain     control.  Once her pain was controlled he took her for a Whipple on 4/15/2020.      Pathology revealed invasive moderately to poorly differentiated adenocarcinoma     in the pancreas as well as an 5 of 21 lymph nodes.  She also had perineural     invasion present.  Pathologic stage was pT2pN2.  She was also found to have     significant chronic pancreatitis and pancreatic intraepithelial neoplasia.            [facility] - Oncology Interim      Patient comes in today to follow-up after her Whipple procedure which was done     on 4/15/2020.  Since that time I have been in communication with her surgeon Dr. Casillas.  He states that she did very well with surgery and has recovered     quickly.  Her biggest difficulties have been with anxiety over the entire     procedure, recovery, and diagnosis.  Thanks to nutritional support prior to     surgery she has done well in this regard as well.            I discussed the need for chemotherapy with the patient and her daughter by video    chat.  The patient was agreeable to treatment with chemotherapy.  However, she     had several concerns about her medications.  She felt like she did not know     which medications to continue which to stop after she was discharged from .            The patient and her daughter were also concerned that she had had several     electrolyte abnormalities while she is at .  It is been more than 1 week since    her labs were checked and she requests them to be checked again.            She has had some abdominal pain recently but feels this is due to not taking     Creon for a week.  She did not develop diarrhea but sounds like she has had some    difficulty with constipation.  She just started Creon again yesterday and thinks    it may be helping.            ECOG Performance Status      1            PAST, FAMILY   Past Medical History      Past Medical History:  Arthritis, Depression, Diabetes Type 2, High Cholesterol,    Hypertension       Hematology/Oncology (F):  Pancreatic Cancer            Past Surgical History      Appendectomy, Skin Cancer Removal            HERNIA REPAIR,LEFT OVARY (Left oophrectomy due to possible early      malignancy)            Family History      Family History:  Breast Cancer (AUNT)            Social History      Marital Status:        Lives independently:  Yes      Number of Children:  1            Tobacco Use      Tobacco status:  Never smoker      Currently Vaping:  No            Alcohol Use      Alcohol intake:  None            Substance Use      Substance use:  Denies use            REVIEW OF SYSTEMS      General:  Admits: Weight Loss;          Denies: Appetite Change, Fatigue, Fever, Night Sweats, Weight Gain      Eye:  Denies Blurred Vision, Denies Corrective Lenses, Denies Diplopia, Denies     Vision Changes      ENT:  Denies Headache, Denies Hearing Loss, Denies Hoarseness, Denies Sore     Throat      Cardiovascular:  Denies Chest Pain, Denies Palpitations      Respiratory:  Denies: Cough, Coughing Blood, Productive Cough, Shortness of Air,    Wheezing      Gastrointestinal:  Denies Bloody Stools, Denies Constipation, Denies Diarrhea,     Denies Nausea/Vomiting, Denies Problem Swallowing, Denies Unable to Control     Bowels      Other      ABDOMINAL PAIN      Genitourinary:  Denies Blood in Urine, Denies Incontinence, Denies Painful     Urination      Musculoskeletal:  Denies Back Pain, Denies Muscle Pain, Denies Painful Joints      Integumentary:  Denies Itching, Denies Lesions, Denies Rash      Neurologic:  Denies Dizziness, Denies Numbness\Tingling, Denies Seizures      Psychiatric:  Denies Anxiety, Denies Depression      Endocrine:  Denies Cold Intolerance, Denies Heat Intolerance      Hematologic/Lymphatic:  Denies Bruising, Denies Bleeding, Denies Enlarged Lymph     Nodes      Reproductive:  Denies: Menopause, Heavy Periods, Pregnant, Still Menstruating            VITAL SIGNS AND SCORES       Vitals      Weight 159 lbs 6.281 oz / 72.3 kg      Temperature 96.8 F / 36 C - Temporal      Pulse 81      Respirations 20      Blood Pressure 134/69 Sitting, Left Arm      Pulse Oximetry 99%, ROOM AIR            Pain Score      Experiencing any pain?:  Yes (ABDOMINAL PAIN)      Pain Scale Used:  Numerical      Pain Intensity:  6            Fatigue Score      Experiencing any fatigue?:  No      Fatigue (0-10 scale):  0 (none)            PT/OT Functional Screening      Weakness            Speech Functional Screening      No needs identified            Rehab to be Ordered      Type of Referral to be Ordered:  Patient Declined            EXAM      General: Alert, cooperative, no acute distress but thinner than on previous exam      Eyes: Anicteric sclera, PERRLA      HEENT: Oropharynx clear, no exudates      Respiratory: CTAB, normal respiratory effort      Abdomen: Normal active bowel sounds, no tenderness to light palpation, no     distention      Cardiovascular: RRR, no murmur, no peripheral edema      Skin: Normal tone, no rash, well-healed abdominal scar      Psychiatric: Appropriate affect, intact judgment      Neurologic: No focal sensory or motor deficits, no weakness, numbness, dizziness      Musculoskeletal: Normal muscle strength, normal muscle tone      Extremities: No clubbing, cyanosis, or deformities            PREVENTION      Hx Influenza Vaccination:  Yes      Date Influenza Vaccine Given:  Oct 2, 2019      Influenza Vaccine Declined:  No      2 or More Falls Past Year?:  No      Fall Past Year with Injury?:  No      Hx Pneumococcal Vaccination:  Yes      Encouraged to follow-up with:  PCP regarding preventative exams.      Chart initiated by      VARGHESE OSPINA MA            ALLERGY/MEDS      Allergies      Coded Allergies:             ADHESIVE TAPE (Verified  Allergy, Unknown, 5/14/20)            Medications      Last Reconciled on 5/14/20 20:57 by EMERALD MUNIZ      Prochlorperazine Maleate  (Prochlorperazine Maleate) 10 Mg Tab      10 MG PO Q6H PRN for NAUSEA AND/OR VOMITING, #60 TAB 3 Refills         Prov: EMERALD MUNIZ         5/14/20       Ondansetron Odt (ONDANSETRON ODT) 8 Mg Tab.rapdis      8 MG PO Q8H PRN for NAUSEA, #30 TAB.RAPDIS 5 Refills         Prov: EMERALD MUNIZ         5/14/20       Dronabinol (Dronabinol) 5 Mg Capsule      5 MG PO BID, #60 CAP 3 Refills         Reported         5/14/20       Valerian Root (Valerian Root) 100 Mg Capsule      100 MG PO QDAY, CAP         Reported         5/14/20       Multivitamins W-Iron (One Daily Multivitamin) 1 Each Tablet      1 EACH PO QDAY, TAB         Reported         5/14/20       busPIRone HCl (busPIRone HCl) 10 Mg Tablet      10 MG PO BID, #60 TAB         Reported         5/14/20       Prazosin HCl (Prazosin HCl) 2 Mg Capsule      2 MG PO QDAY, #60 CAP 0 Refills         Reported         5/14/20       tiZANidine HCl (tiZANidine HCl) 2 Mg Capsule      2 MG PO BID, CAP         Reported         5/14/20       Sucralfate (Carafate) 1,000 Mg/10 Ml Oral.susp      1000 MG PO BID, #600 ML         Reported         5/14/20       (Creon)   No Conflict Check      1 CAP PO TID         Reported         5/14/20       Magnesium Oxide (Magnesium Oxide) 500 Mg Tablet      500 MG PO QDAY, TAB         Reported         3/5/20       Melatonin (Melatonin) 5 Mg Tablet      5 MG PO HS PRN for SLEEP, TAB         Reported         3/5/20       Loratadine (Claritin) 10 Mg Tablet      5 MG PO QDAY, #15 TAB 0 Refills         Reported         3/5/20       Sertraline HCl (Zoloft*) 25 Mg Tablet      25 MG PO QDAY         Reported         3/5/20       Hema-Fluticasone (Fluticasone 50 mcg) 16 Gm Spray.susp      2 SPRAYS NARE EACH QDAY         Reported         3/5/20       Insulin Detemir (Levemir VIAL*) 100 Unit/1 Ml Vial      10 UNITS SUBQ QAM         Reported         3/5/20       Omeprazole (Omeprazole*) 40 Mg Capsule      40 MG PO QDAY         Reported         1/17/20        Lisinopril* (Lisinopril*) 20 Mg Tablet      20 MG PO HS, #30 TAB 0 Refills         Reported         1/17/20       traZODone HCl (traZODone HCl) 100 Mg Tablet      100 MG PO HS, #30 TAB 0 Refills         Reported         11/5/18      Medications Reviewed:  Changes made to meds            IMPRESSION/PLAN      Impression      69-year-old female with a recent diagnosis of pancreatic adenocarcinoma status     post Whipple.            Diagnosis      Pancreatic cancer - C25.9            Notes      New Medications      * (CREON): 1 CAP PO TID      * Sucralfate (Carafate) 1,000 MG/10 ML ORAL.SUSP: 1,000 MG PO BID #600      * tiZANidine HCl 2 MG CAPSULE: 2 MG PO BID      * Prazosin HCl 2 MG CAPSULE: 2 MG PO QDAY #60      * busPIRone HCl 10 MG TABLET: 10 MG PO BID #60      * Multivitamins W-Iron (One Daily Multivitamin) 1 EACH TABLET: 1 EACH PO QDAY      * Valerian Root 100 MG CAPSULE: 100 MG PO QDAY      * Dronabinol 5 MG CAPSULE: 5 MG PO BID #60         Instructions: 1 CAP      * ONDANSETRON ODT 8 MG TAB.RAPDIS: 8 MG PO Q8H PRN NAUSEA #30      * Prochlorperazine Maleate 10 MG TAB: 10 MG PO Q6H PRN NAUSEA AND/OR VOMITING       #60      New Diagnostics      * CBC With Auto Diff, Routine         Dx: Pancreatic cancer - C25.9      * CMP Comp Metabolic Panel, Routine         Dx: Pancreatic cancer - C25.9      * Magnesium Serum, Routine         Dx: Pancreatic cancer - C25.9      * Phosphorus Blood/P04, Routine         Dx: Pancreatic cancer - C25.9            Plan      Pancreatic adenocarcinoma: Patient underwent a Whipple on 4/15/2020.  She has     recovered well.  Her surgeon I have discussed her case and he feels comfortable     with her proceeding to chemotherapy at this time.  I discussed the risks and     benefits to adjuvant chemotherapy with gemcitabine and Abraxane on days 1 and 8     of a 21-day cycle.  The patient is agreeable.  She Ronald has a port.            Anxiety/depression: The patient has been on Zoloft for  many years and was     started on BuSpar for anxiety after her Whipple.  Her biggest complaint at this     point is that she is unable to sleep well at night.  She gets 3 to 4 hours     asleep and this is probably due to anxiety.            Leg spasm: The patient is on tizanidine and prazosin.  She still has some     difficulty with leg spasms.  She will discuss these medications with her primary    care provider.            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  We will continue to monitor blood     glucose.            Malnutrition: The patient required a feeding tube and hospitalization for     nutritional optimization prior to surgery.  She was also on Creon but when that     prescription ran out she did not take it for a week.  Now that she is back on     Creon her abdominal pain is improving but she has a poor appetite.  She request     something for appetite stimulation.  I discussed her case with our pharmacist to    help pick the best appetite stimulating medication.  He advised me that Megace     and mirtazapine are both contraindicated with her current medications.      Therefore I will prescribe Marinol.            Hypokalemia and hypomagnesemia: Patient had low levels of magnesium and     potassium following surgery.  Recheck labs today which show that her potassium     remains slightly low at 3.0 and magnesium is within normal limits.  My nurse     called the patient after the clinic visit to tell her these results.  I believe     with her improve diet the potassium will improve on its own.  If it does not     improve by the time she starts chemotherapy I will start her on IV or oral     replacement.            Patient Education      Patient Education Provided:  Yes            Electronically signed by EMERALD MUNIZ  05/14/2020 20:57       Disclaimer: Converted document may not contain table formatting or lab diagrams.  Please see [facility] [employer] [facility] for the authenticated document.

## 2021-05-28 NOTE — PROGRESS NOTES
Patient: SAMAN DUNCAN     Acct: [id number]     Report: #[id number]  UNIT #: [id number]     : 1950    Encounter Date:2020  PRIMARY CARE: MAYA RICE  ***Signed***  --------------------------------------------------------------------------------------------------------------------  NURSE INTAKE      Visit Type      Established Patient Visit            Chief Complaint      PANCREATIC CA HERE FOR TX            Referring Provider/Copies To      Referring Provider:  ROYAL WELLS      Primary Care Provider:  ROYAL WELLS            History and Present Illness      Past Oncology Illness History      Ms. Duncan is a 70-year-old female with pancreatic cancer.             Presented with abdomnial pain and jaundice. 2020 patient underwent EUS at     [place] by Dr. Acevedo, pancreatic head mass, 2.8 x 2.7 cm in size as well as a     common bile duct stricture.  She had a stent placed in the common bile duct.     Biopsy was negative. The procedure was complicated by pancreatitis, mesenteric     vein thrombosis and then E coli sepsis.               Then on 3/5/2020 she had worsening abdominal pain and was readmitted to [facility] with    ascending cholangitis secondary to a blocked stent.  On 3/6/2020 the patient     underwent ERCP with stent exchange by Dr. Wells. CT abdomen/pelvis showed     cystic areas within the head of the pancreas that may relate to pancreatitis or     possibly branch type IPMN.             Referred to Dr. Casillas at the [facility].  She was admitted for     feeding tube placement and nutritional optimization and pain control.  Whipple     procedure on 4/15/2020.  Pathology revealed invasive moderately to poorly dif    ferentiated adenocarcinoma in the pancreas as well as an 5 of 21 lymph nodes.      She also had perineural invasion present.  Pathologic stage was pT2pN2.              Cycle 1 of adjuvant chemotherapy with gemcitabine and Abraxane on  5/22/2020.      Cycle 1 day 8 on 5/29/2020.      Cycle 2-day 1 on 6/15/2020, started doing treatment on days 1 and 15 of a q. 28-    day cycle, C2D15 on 6/29/20      Cycle 3-day 1 on 7/13/2020      Cycle 4-day 1 on 8/10/2020            Iron deficiency anemia: Patient was treated with Injectafer on June 8 and 15.            HPI - Oncology Interim      Patient comes in today for cycle 4-day 15 of adjuvant chemotherapy.  I talked     with her daughter on the phone while the patient was in the room.  I discussed     with him the plan for 4 versus 6 cycles of chemotherapy.  I explained that     initially when the patient started adjuvant chemotherapy she was having a     difficult time with side effects post surgery.  I wrote orders for chemotherapy     to last for 6 cycles but recognize that 4 cycles would probably be the maximum     the patient can tolerate.  She has tolerated chemotherapy better since that time    but her counts continue to go down.  There is not good evidence that 6 cycles is    better than 4 cycles therefore I am comfortable stopping the patient's     chemotherapy after 4 cycles.  The patient agreed with this plan because she is     ready to be finished with treatment.            The patient's biggest concern is her blood pressure.  Today in clinic her blood     pressure was in the 170s systolic.  Patient says at home her blood pressure was     188/80 this morning.  She has not seen her primary care provider since starting     chemotherapy but plans to make an appointment now that chemotherapy is coming to    an end.  We discussed the plan for further imaging and port flushes as well.            ECOG Performance Status      1            Most Recent Lab Findings      Laboratory Tests      8/10/20 09:33            8/10/20 09:45            PAST, FAMILY   Past Medical History      Past Medical History:  Arthritis, Depression, Diabetes Type 2, High Cholesterol,    Hypertension      Hematology/Oncology  (F):  Pancreatic Cancer            Past Surgical History      Appendectomy, Skin Cancer Removal            HERNIA REPAIR,LEFT OVARY (Left oophrectomy due to possible early      malignancy)            Family History      Family History:  Breast Cancer (AUNT), Prostate Cancer (Brother)            Social History      Marital Status:        Lives independently:  Yes      Number of Children:  1            Tobacco Use      Tobacco status:  Never smoker      Currently Vaping:  No            Alcohol Use      Alcohol intake:  None            Substance Use      Substance use:  Denies use            REVIEW OF SYSTEMS      General:  Admits: Fatigue;          Denies: Appetite Change, Fever, Night Sweats, Weight Gain, Weight Loss      Eye:  Denies Blurred Vision, Denies Corrective Lenses, Denies Diplopia, Denies     Vision Changes      ENT:  Denies Headache, Denies Hearing Loss, Denies Hoarseness, Denies Sore     Throat      Cardiovascular:  Denies Chest Pain, Denies Palpitations      Respiratory:  Denies: Cough, Coughing Blood, Productive Cough, Shortness of Air,    Wheezing      Genitourinary:  Denies Blood in Urine, Denies Incontinence, Denies Painful     Urination      Musculoskeletal:  Denies Back Pain, Denies Muscle Pain, Denies Painful Joints      Integumentary:  Denies Itching, Denies Lesions, Denies Rash      Neurologic:  Denies Dizziness, Denies Numbness\Tingling, Denies Seizures      Psychiatric:  Denies Anxiety, Denies Depression      Endocrine:  Denies Cold Intolerance, Denies Heat Intolerance      Hematologic/Lymphatic:  Denies Bruising, Denies Bleeding, Denies Enlarged Lymph     Nodes      Reproductive:  Denies: Menopause, Heavy Periods, Pregnant, Still Menstruating            VITAL SIGNS AND SCORES      Vitals      Height 5 ft 3.46 in / 161.2 cm      Weight 151 lbs 14.351 oz / 68.9 kg      BSA 1.73 m2      BMI 26.5 kg/m2      Temperature 98.2 F / 36.78 C - Temporal      Pulse 59      Respirations 20       Blood Pressure 158/75 Sitting, Left Arm      Pulse Oximetry 97%, ROOM AIR            Pain Score      Experiencing any pain?:  No      Pain Scale Used:  Numerical      Pain Intensity:  0            Fatigue Score      Experiencing any fatigue?:  No      Fatigue (0-10 scale):  0 (none)            PT/OT Functional Screening      Weakness            Speech Functional Screening      No needs identified            Rehab to be Ordered      Type of Referral to be Ordered:  Patient Declined            EXAM      General: Alert, cooperative, no acute distress      Eyes: Anicteric sclera, PERRLA      HEENT: Oropharynx clear, no exudates      Respiratory: CTAB, normal respiratory effort      Abdomen: Normal active bowel sounds, no tenderness, no distention      Cardiovascular: RRR, no murmur, no peripheral edema      Skin: Normal tone, no rash, no lesions      Psychiatric: Appropriate affect, intact judgment      Neurologic: No focal sensory or motor deficits, no weakness, numbness, dizziness      Musculoskeletal: Normal muscle strength, normal muscle tone      Extremities: No clubbing, cyanosis, or deformities            PREVENTION      Hx Influenza Vaccination:  Yes      Date Influenza Vaccine Given:  Oct 2, 2019      Influenza Vaccine Declined:  No      2 or More Falls in Past Year?:  No      Fall Past Year with Injury?:  No      Hx Pneumococcal Vaccination:  Yes      Encouraged to follow-up with:  PCP regarding preventative exams.      Chart initiated by      CALEB FRANCO            ALLERGY/MEDS      Allergies      Coded Allergies:             ADHESIVE TAPE (Verified  Allergy, Unknown, 7/27/20)      Uncoded Allergies:             band aids (Allergy, Unknown, blisters, 5/22/20)            Medications      Last Reconciled on 8/24/20 12:59 by EMERALD MUNIZ      Carvedilol (Coreg) 6.25 Mg Tablet      6.25 MG PO BID, #60 TAB 3 Refills         Prov: EMERALD MUNIZ         8/24/20       Carvedilol (Carvedilol) 3.125 Mg Tablet       3.125 MG PO BID, #60 TAB 0 Refills         Prov: MUNIZEMERALD         8/10/20       Dronabinol (Dronabinol) 5 Mg Capsule      5 MG PO BID, #60 CAP 4 Refills         Reported         7/27/20       Amylase/Lipase/Protease (Creon 6*) 1 Each Capsule.dr      85919 UNITS PO TID MEALS for 30 Days, #270 CAP.SR 1 Refill         Prov: EMERALD MUNIZ         6/29/20       Potassium Chloride (K-Dur*) 10 Meq Tab.er.prt      20 MEQ PO BID, #120 TAB 0 Refills         Reported         6/29/20       Meloxicam (Meloxicam*) 7.5 Mg Tablet      7.5 MG PO BID, #30 TAB 0 Refills         Reported         6/15/20       Dronabinol (Dronabinol) 5 Mg Capsule      5 MG PO HS, #30 CAP 3 Refills         Reported         5/22/20       Lidocaine/Prilocaine (Emla) 30 Gm Cream..g.      1 APL TOPICAL ONCE, #2 GM 5 Refills         Prov: EMERALD MUNIZ         5/21/20       Prochlorperazine Maleate (Prochlorperazine Maleate) 10 Mg Tab      10 MG PO Q6H PRN for NAUSEA AND/OR VOMITING, #60 TAB 3 Refills         Prov: EMERALD MUNIZ         5/14/20       Ondansetron Odt (ONDANSETRON ODT) 8 Mg Tab.rapdis      8 MG PO Q8H PRN for NAUSEA, #30 TAB.RAPDIS 5 Refills         Prov: EMERALD MUNIZ         5/14/20       Dronabinol (Dronabinol) 5 Mg Capsule      5 MG PO BID, #60 CAP 3 Refills         Reported         5/14/20       Valerian Root (Valerian Root) 100 Mg Capsule      100 MG PO QDAY, CAP         Reported         5/14/20       Multivitamins W-Iron (One Daily Multivitamin) 1 Each Tablet      1 EACH PO QDAY, TAB         Reported         5/14/20       busPIRone HCl (busPIRone HCl) 10 Mg Tablet      10 MG PO BID, #60 TAB         Reported         5/14/20       Prazosin HCl (Prazosin HCl) 2 Mg Capsule      2 MG PO QDAY, #60 CAP 0 Refills         Reported         5/14/20       tiZANidine HCl (tiZANidine HCl) 2 Mg Capsule      2 MG PO BID, CAP         Reported         5/14/20       Sucralfate (Carafate) 1,000 Mg/10 Ml Oral.susp      1000 MG PO BID, #600 ML          Reported         5/14/20       Melatonin (Melatonin) 5 Mg Tablet      5 MG PO HS PRN for SLEEP, TAB         Reported         3/5/20       Loratadine (Claritin) 10 Mg Tablet      5 MG PO QDAY, #15 TAB 0 Refills         Reported         3/5/20       Sertraline HCl (Zoloft*) 25 Mg Tablet      25 MG PO QDAY         Reported         3/5/20       Hema-Fluticasone (Fluticasone 50 mcg) 16 Gm Spray.susp      2 SPRAYS NARE EACH QDAY         Reported         3/5/20       Insulin Detemir (Levemir VIAL*) 100 Unit/1 Ml Vial      10 UNITS SUBQ QAM         Reported         3/5/20       Omeprazole (Omeprazole*) 40 Mg Capsule      40 MG PO QDAY         Reported         1/17/20       Lisinopril* (Lisinopril*) 20 Mg Tablet      20 MG PO HS, #30 TAB 0 Refills         Reported         1/17/20       traZODone HCl (traZODone HCl) 100 Mg Tablet      100 MG PO HS, #30 TAB 0 Refills         Reported         11/5/18      Medications Reviewed:  No Changes made to meds            IMPRESSION/PLAN      Impression      70-year-old female with pancreatic adenocarcinoma finishing adjuvant     chemotherapy.            Diagnosis      Pancreatic cancer - C25.9            Notes      New Medications      * Carvedilol (Coreg) 6.25 MG TABLET: 6.25 MG PO BID #60      New Diagnostics      * CT Abd/Pelvis/Chest W/Contrast, 2 Week         Dx: Pancreatic cancer - C25.9            Plan      Pancreatic adenocarcinoma: Patient underwent a Whipple procedure on 4/15/2020     and is here today for cycle 4-day 15 of adjuvant chemotherapy with gemcitabine     and Abraxane.  I discussed stopping chemotherapy with today's dose to complete 4    cycles versus continuing for 2 more cycles.  After weighing the risks and     benefits as we understand them the patient elected to complete treatment today.     I will plan to get a CT CAP with contrast in the next 2 to 3 weeks and follow-up    with the patient by phone afterwards.  She will get her port flushed today and      then will need another port flush in 8 weeks.  The patient is interested in     keeping the port in place for the next several months and we will continue to     reevaluate this at future appointments.            Hypertension: At the last visit I started the patient on 3.1 mg of Coreg twice     daily.  I will increase that to 6.2 mg twice daily today.            Malnutrition: Patient's weight is stable and she is eating well while on     Marinol.  She is also doing well with Creon supplementation so long as she takes    4 tablets with bigger meals and 3 tablets with smaller meals.  She occasionally     has abdominal pain.  She has an appointment with Dr. Clancy on 9/8/2020 to     discuss further management.            Genetics: Patient was previously seen by a genetic counselor and is awaiting     results.            Patient Education      Patient Education Provided:  Yes            Electronically signed by EMERALD MUNIZ  08/24/2020 13:00       Disclaimer: Converted document may not contain table formatting or lab diagrams. Please see [employer] [facility] for the authenticated document.

## 2021-05-28 NOTE — PROGRESS NOTES
Patient: SAMAN DUNCAN     Acct: [id number]     Report: #[id number]  UNIT #: [id number]     : 1950    Encounter Date:2020  PRIMARY CARE: MAYA RICE  ***Signed***  --------------------------------------------------------------------------------------------------------------------  NURSE INTAKE      Visit Type      Established Patient Visit            Chief Complaint      PANCREATIC CANCER            Referring Provider/Copies To      Referring Provider:  ROYAL WELLS      Primary Care Provider:  ROYAL WELLS      Copies To:   ROYAL WELLS            History and Present Illness      Past Oncology Illness History      Ms. Duncan is a 69-year-old female who was referred to me by Dr. Wells in     gastroenterology for possible pancreatic cancer. She was in clinic today with     her daughter.  She states that she has been having progressively worse symptoms     since early  when she developed significant abdominal pain and decreased     ability to tolerate food.  At the time images were notable for gallstones so she    underwent a cholecystectomy.  However, the pain did not improve.  By 2020 she developed jaundice.  On 2020 patient underwent EUS at [place] by     Dr. Acevedo.  She is found to have pancreatic head mass, 2.8 x 2.7 cm in size as     well as a common bile duct stricture.  She had a stent placed in the common bile    duct. The patient reports the biopsy was negative and Dr. Acevedo plan to repeat     the procedure in April.  (I have not personally reviewed the pathology report     but spoke with Dr. Wells who did confirm that the biopsy was negative.)  The     patient states the EUS and biopsy was complicated by pancreatitis, then she     developed thrombosis and some mesenteric vessel.  (I do not have the report of     this finding either.)  Then she developed sepsis secondary to E. coli     bacteremia.            Then on 3/5/2020 she had  worsening abdominal pain and was readmitted to the     hospital here at [facility].  This is when she was seen by Dr. Clancy and diagnosed     with ascending cholangitis secondary to a blocked stent.  On 3/6/2020 the     patient underwent ERCP with stent exchange.  Bile duct brushing during this     procedure was negative for malignant cells but this is a suboptimal specimen due    to artifact drying.  Culture from the purulent drainage did grow enterococcus.      The patient was successfully treated with antibiotics and discharged home.  She     spoke at length with Dr. Clancy and expressed her wish not to return to [place]     for further care.  She wishes to be treated locally and have any necessary     procedures done at the [facility].            CT abdomen/pelvis with contrast on 3/5/2020: 1) a common bile stent is noted.     There is more prominent dilitation of the intrahepatic biliary tree and common     bile duct as compared to the prior study.  2) cystic areas within the head of     the pancreas that may relate to pancreatitis or possibly branch type IPMN.  3)     right renal cyst.  Largest measuring 5.4 cm 4) atherosclerotic changes 5)     degenerative changes in the lumbar spine            MRI abdomen + MRCP on 1/17/2020: Previous cholecystectomy with significant     intra-and extrahepatic bile duct dilation.  No evidence for choledocholithiasis.     There is abrupt caliber change in the common bile duct at the level of the head    of the pancreas.  Multiple small cysts in the head of the pancreas could     represent sequelae of chronic pancreatitis or sidebranch IPMN.  Distal common     bile duct stricture from chronic pancreatitis or mass-effect could result in the    appearance of common bile duct.  No enhancing pancreatic mass.            The patient's was referred to Dr. Casillas at the [facility].  She     was admitted for feeding tube placement and nutritional optimization and  pain     control.  Once her pain was controlled he took her for a Whipple on 4/15/2020.      Pathology revealed invasive moderately to poorly differentiated adenocarcinoma     in the pancreas as well as an 5 of 21 lymph nodes.  She also had perineural     invasion present.  Pathologic stage was pT2pN2.  She was also found to have     significant chronic pancreatitis and pancreatic intraepithelial neoplasia.            Cycle 1 of chemotherapy with gemcitabine and Abraxane on 5/22/2020            HPI - Oncology Interim      Patient comes in today for cycle 1 of chemotherapy with gemcitabine Abraxane.      She complains that she still has reduced appetite.  We had discussed Marinol     previously and she would like a prescription for that.  She is trying to snack     and increase the number of meals throughout the day.  She is also drinking 3     boost shakes daily.  She was on oral potassium prior to surgery but has not been    on it since that time although she has had persistently low potassium levels.      Her biggest complaints today include severe sleep difficulty.  She says that her    sleep became very disturbed during her hospitalization and she has not been able    to correct it since that time.  She is also very constipated.  Her constipation     is leading to some abdominal pain.  We discussed over-the-counter stool     softeners and laxatives.  She still occasionally has some nausea which she has     had since before surgery.            ECOG Performance Status      1            Most Recent Lab Findings      Laboratory Tests      5/14/20 11:10            5/22/20 08:56            Laboratory Tests            Test       5/14/20      11:10             Magnesium Level       1.68 mg/dL      (1.60-2.30)            PAST, FAMILY   Past Medical History      Past Medical History:  Arthritis, Depression, Diabetes Type 2, High Cholesterol,    Hypertension      Hematology/Oncology (F):  Pancreatic Cancer            Past  Surgical History      Appendectomy, Skin Cancer Removal            HERNIA REPAIR,LEFT OVARY (Left oophrectomy due to possible early      malignancy)            Family History      Family History:  Breast Cancer (AUNT)            Social History      Marital Status:        Lives independently:  Yes      Number of Children:  1            Tobacco Use      Tobacco status:  Never smoker      Currently Vaping:  No            Alcohol Use      Alcohol intake:  None            Substance Use      Substance use:  Denies use            REVIEW OF SYSTEMS      General:  Admits: Appetite Change, Fatigue, Weight Loss;          Denies: Fever, Night Sweats, Weight Gain      Other      Poor sleep      Eye:  Denies Blurred Vision, Denies Corrective Lenses, Denies Diplopia, Denies     Vision Changes      ENT:  Denies Headache, Denies Hearing Loss, Denies Hoarseness, Denies Sore     Throat      Cardiovascular:  Denies Chest Pain, Denies Palpitations      Respiratory:  Denies: Cough, Coughing Blood, Productive Cough, Shortness of Air,    Wheezing      Gastrointestinal:  Admits Constipation; Denies Bloody Stools, Denies Diarrhea,     Denies Nausea/Vomiting, Denies Problem Swallowing, Denies Unable to Control     Bowels      Genitourinary:  Denies Blood in Urine, Denies Incontinence, Denies Painful     Urination      Musculoskeletal:  Denies Back Pain, Denies Muscle Pain, Denies Painful Joints      Integumentary:  Denies Itching, Denies Lesions, Denies Rash      Neurologic:  Denies Dizziness, Denies Numbness\Tingling, Denies Seizures      Psychiatric:  Denies Anxiety, Denies Depression      Endocrine:  Denies Cold Intolerance, Denies Heat Intolerance      Hematologic/Lymphatic:  Denies Bruising, Denies Bleeding, Denies Enlarged Lymph     Nodes      Reproductive:  Denies: Menopause, Heavy Periods, Pregnant, Still Menstruating            VITAL SIGNS AND SCORES      Vitals      Height 5 ft 4.57 in / 164 cm      Weight 158 lbs 11.699 oz /  72.0 kg      BSA 1.78 m2      BMI 26.8 kg/m2      Temperature 98.2 F / 36.78 C - Temporal      Pulse 86      Respirations 20      Blood Pressure 137/65 Sitting      Pulse Oximetry 98%, ROOM AIR            Pain Score      Experiencing any pain?:  No      Pain Scale Used:  Numerical      Pain Intensity:  0            Fatigue Score      Experiencing any fatigue?:  No      Fatigue (0-10 scale):  0 (none)            PT/OT Functional Screening      Weakness            Speech Functional Screening      No needs identified            Rehab to be Ordered      Type of Referral to be Ordered:  Patient Declined            EXAM      General: Alert, cooperative, no acute distress      Eyes: Anicteric sclera, PERRLA      HEENT: Oropharynx clear, no exudates      Respiratory: CTAB, normal respiratory effort      Abdomen: Normal active bowel sounds, no tenderness, no distention      Cardiovascular: RRR, no murmur, no peripheral edema      Skin: Normal tone, no rash, no lesions      Psychiatric: Appropriate affect, intact judgment      Neurologic: No focal sensory or motor deficits, no weakness, numbness, dizziness      Musculoskeletal: Normal muscle strength, normal muscle tone      Extremities: No clubbing, cyanosis, or deformities            PREVENTION      Hx Influenza Vaccination:  Yes      Date Influenza Vaccine Given:  Oct 2, 2019      Influenza Vaccine Declined:  No      2 or More Falls Past Year?:  No      Fall Past Year with Injury?:  No      Hx Pneumococcal Vaccination:  Yes      Encouraged to follow-up with:  PCP regarding preventative exams.      Chart initiated by      AGGIE AGUERO CMA            ALLERGY/MEDS      Allergies      Coded Allergies:             ADHESIVE TAPE (Verified  Allergy, Unknown, 5/22/20)      Uncoded Allergies:             band aids (Allergy, Unknown, blisters, 5/22/20)            Medications      Last Reconciled on 5/28/20 23:19 by EMERALD MUNIZ      Dronabinol (Dronabinol) 5 Mg Capsule      5 MG  PO HS, #30 CAP 3 Refills         Reported         5/22/20       Lidocaine/Prilocaine (Emla) 30 Gm Cream..g.      1 APL TOPICAL ONCE, #2 GM 4 Refills         Prov: KATIE BAIN onc         5/21/20       Lidocaine/Prilocaine (Emla) 30 Gm Cream..g.      1 APL TOPICAL ONCE, #2 GM 5 Refills         Prov: FLAVIOEMERALD         5/21/20       Prochlorperazine Maleate (Prochlorperazine Maleate) 10 Mg Tab      10 MG PO Q6H PRN for NAUSEA AND/OR VOMITING, #60 TAB 3 Refills         Prov: MUNIZEMERALD         5/14/20       Ondansetron Odt (ONDANSETRON ODT) 8 Mg Tab.rapdis      8 MG PO Q8H PRN for NAUSEA, #30 TAB.RAPDIS 5 Refills         Prov: SHAZIA MUNIZIE         5/14/20       Dronabinol (Dronabinol) 5 Mg Capsule      5 MG PO BID, #60 CAP 3 Refills         Reported         5/14/20       Valerian Root (Valerian Root) 100 Mg Capsule      100 MG PO QDAY, CAP         Reported         5/14/20       Multivitamins W-Iron (One Daily Multivitamin) 1 Each Tablet      1 EACH PO QDAY, TAB         Reported         5/14/20       busPIRone HCl (busPIRone HCl) 10 Mg Tablet      10 MG PO BID, #60 TAB         Reported         5/14/20       Prazosin HCl (Prazosin HCl) 2 Mg Capsule      2 MG PO QDAY, #60 CAP 0 Refills         Reported         5/14/20       tiZANidine HCl (tiZANidine HCl) 2 Mg Capsule      2 MG PO BID, CAP         Reported         5/14/20       Sucralfate (Carafate) 1,000 Mg/10 Ml Oral.susp      1000 MG PO BID, #600 ML         Reported         5/14/20       (Creon)   No Conflict Check      1 CAP PO TID         Reported         5/14/20       Melatonin (Melatonin) 5 Mg Tablet      5 MG PO HS PRN for SLEEP, TAB         Reported         3/5/20       Loratadine (Claritin) 10 Mg Tablet      5 MG PO QDAY, #15 TAB 0 Refills         Reported         3/5/20       Sertraline HCl (Zoloft*) 25 Mg Tablet      25 MG PO QDAY         Reported         3/5/20       Hema-Fluticasone (Fluticasone 50 mcg) 16 Gm Spray.susp      2 SPRAYS NARE  EACH QDAY         Reported         3/5/20       Insulin Detemir (Levemir VIAL*) 100 Unit/1 Ml Vial      10 UNITS SUBQ QAM         Reported         3/5/20       Omeprazole (Omeprazole*) 40 Mg Capsule      40 MG PO QDAY         Reported         1/17/20       Lisinopril* (Lisinopril*) 20 Mg Tablet      20 MG PO HS, #30 TAB 0 Refills         Reported         1/17/20       traZODone HCl (traZODone HCl) 100 Mg Tablet      100 MG PO HS, #30 TAB 0 Refills         Reported         11/5/18      Medications Reviewed:  No Changes made to meds            IMPRESSION/PLAN      Impression      69-year-old female with a new diagnosis of pancreatic adenocarcinoma status post     Whipple here for her first cycle of chemotherapy.            Diagnosis      Anemia - D64.9            Pancreatic cancer - C25.9            Notes      New Medications      * Dronabinol 5 MG CAPSULE: 5 MG PO HS #30         Instructions: 1 CAP      New Diagnostics      * Ferritin Level, Routine         Dx: Anemia - D64.9      * Iron Profile, Routine         Dx: Anemia - D64.9      * BMP, Week         Dx: Pancreatic cancer - C25.9            Plan      Pancreatic adenocarcinoma: Diagnosis was confirmed during her Whipple procedure     on 4/15/2020.  Unfortunately 5 of 21 lymph nodes were involved for pathologic     stage of T2N2.  Patient is here for her first cycle, day 1 of gemcitabine and     Abraxane.  She will return to clinic next week for day 8.            Hypokalemia: Patient's potassium was within normal limits today.  I will recheck     BMP and magnesium level with her day 8 chemotherapy.            Poor appetite: We will start patient on Marinol.  I reviewed her current diet     which the patient is doing well with.  She is taking 3 boost shakes a day and     eating small frequent meals.  Her weight has been stable over the past few     weeks.            Microcytic anemia: We will check iron studies today as appears the patient     probably has iron  deficiency anemia.             Poor sleep: We discussed good sleep hygiene.  Also encouraged the patient to     take Compazine at nighttime which will help with nausea as well as difficulty     sleeping.            Constipation: Encouraged the patient to use Senokot laxatives as needed            Patient Education      Patient Education Provided:  Yes            Electronically signed by EMERALD MUNIZ  05/28/2020 23:19       Disclaimer: Converted document may not contain table formatting or lab diagrams. Please see [employer] for the authenticated document.

## 2021-05-28 NOTE — PROGRESS NOTES
Patient: SAMAN DUNCAN     Acct: [id number]     Report: #[id number]  UNIT #: [id number]     : 1950    Encounter Date:2020  PRIMARY CARE: MAYA RICE  ***Signed***  --------------------------------------------------------------------------------------------------------------------  NURSE INTAKE      Visit Type      Established Patient Visit            Chief Complaint      PANCREATIC ADENOCARCINOMA (HERE FOR TX)            Referring Provider/Copies To      Referring Provider:  ROYAL WELLS      Primary Care Provider:  ROYAL WELLS      Copies To:   ROYAL WELLS            History and Present Illness      Past Oncology Illness History      Ms. Duncan is a 69-year-old female who was referred to me by Dr. Wells in     gastroenterology for possible pancreatic cancer. She states that she has been     having progressively worse symptoms since early  when she developed sign    ificant abdominal pain and decreased ability to tolerate food.  At the time     images were notable for gallstones so she underwent a cholecystectomy.  However,    the pain did not improve.  By 2020 she developed jaundice.  On 2020    patient underwent EUS at [facility] by Dr. Acevedo.  She is found to have pancreatic     head mass, 2.8 x 2.7 cm in size as well as a common bile duct stricture.  She     had a stent placed in the common bile duct. The patient reports the biopsy was     negative and Dr. Acevedo plan to repeat the procedure in April.  (I have not     personally reviewed the pathology report but spoke with Dr. Wells who did     confirm that the biopsy was negative.)  The patient states the EUS and biopsy     was complicated by pancreatitis, then she developed thrombosis and some     mesenteric vessel.  (I do not have the report of this finding either.)  Then she    developed sepsis secondary to E. coli bacteremia.            Then on 3/5/2020 she had worsening abdominal pain and was  readmitted to the     hospital here at [facility].  This is when she was seen by Dr. Clancy and diagnosed     with ascending cholangitis secondary to a blocked stent.  On 3/6/2020 the     patient underwent ERCP with stent exchange.  Bile duct brushing during this     procedure was negative for malignant cells but this is a suboptimal specimen due    to artifact drying.  Culture from the purulent drainage did grow enterococcus.      The patient was successfully treated with antibiotics and discharged home.  She     spoke at length with Dr. Clancy and expressed her wish not to return to [facility]     for further care.  She wishes to be treated locally and have any necessary     procedures done at the [facility].            CT abdomen/pelvis with contrast on 3/5/2020: 1) a common bile stent is noted.     There is more prominent dilitation of the intrahepatic biliary tree and common     bile duct as compared to the prior study.  2) cystic areas within the head of     the pancreas that may relate to pancreatitis or possibly branch type IPMN.  3)     right renal cyst.  Largest measuring 5.4 cm 4) atherosclerotic changes 5)     degenerative changes in the lumbar spine            MRI abdomen + MRCP on 1/17/2020: Previous cholecystectomy with significant     intra-and extrahepatic bile duct dilation.  No evidence for choledocholithiasis.     There is abrupt caliber change in the common bile duct at the level of the head    of the pancreas.  Multiple small cysts in the head of the pancreas could repres    ent sequelae of chronic pancreatitis or sidebranch IPMN.  Distal common bile     duct stricture from chronic pancreatitis or mass-effect could result in the     appearance of common bile duct.  No enhancing pancreatic mass.            The patient's was referred to Dr. Casillas at the [facility].  She     was admitted for feeding tube placement and nutritional optimization and pain     control.  Once her pain  was controlled he took her for a Whipple on 4/15/2020.      Pathology revealed invasive moderately to poorly differentiated adenocarcinoma     in the pancreas as well as an 5 of 21 lymph nodes.  She also had perineural     invasion present.  Pathologic stage was pT2pN2.  She was also found to have     significant chronic pancreatitis and pancreatic intraepithelial neoplasia.            Cycle 1 of chemotherapy with gemcitabine and Abraxane on 5/22/2020.  Cycle 1 day    8 on 5/29/2020.            HPI - Oncology Interim      Patient comes in today for day 8 of cycle 1 of gemcitabine/Abraxane.  She says     she feels well.  Her only complaint is that she still does not have much of an     appetite.  She got the prescription for Marinol but was just approved by her     insurance yesterday so she has not tried it.  She feels she also has a little     bit dehydrated.  She is trying to keep up with her hydration but now she is not     doing good job.  Her blood pressure is active been elevated as high as 175/78.      She continues to take her daily lisinopril.  She has some fatigue.  Occasionally    she feels like she has a fluttering in her chest that lasts for a few seconds.      She does not have any associated dizziness, lightheadedness, shortness of     breath, or chest pain/pressure.            ECOG Performance Status      1            Most Recent Lab Findings      Laboratory Tests      5/22/20 09:04            5/29/20 08:40            Laboratory Tests            Test       5/22/20      10:10             Ferritin       23 ng/mL      ()            PAST, FAMILY   Past Medical History      Past Medical History:  Arthritis, Depression, Diabetes Type 2, High Cholesterol,    Hypertension      Hematology/Oncology (F):  Pancreatic Cancer            Past Surgical History      Appendectomy, Skin Cancer Removal            HERNIA REPAIR,LEFT OVARY (Left oophrectomy due to possible early      malignancy)            Family  History      Family History:  Breast Cancer (AUNT)            Social History      Marital Status:        Lives independently:  Yes      Number of Children:  1            Tobacco Use      Tobacco status:  Never smoker      Currently Vaping:  No            Alcohol Use      Alcohol intake:  None            Substance Use      Substance use:  Denies use            REVIEW OF SYSTEMS      General:  Denies: Appetite Change, Fatigue, Fever, Night Sweats, Weight Gain,     Weight Loss      Eye:  Denies Blurred Vision, Denies Corrective Lenses, Denies Diplopia, Denies     Vision Changes      ENT:  Denies Headache, Denies Hearing Loss, Denies Hoarseness, Denies Sore     Throat      Cardiovascular:  Denies Chest Pain; Admits Palpitations      Respiratory:  Denies: Cough, Coughing Blood, Productive Cough, Shortness of Air,    Wheezing      Gastrointestinal:  Denies Bloody Stools, Denies Constipation, Denies Diarrhea,     Denies Nausea/Vomiting, Denies Problem Swallowing, Denies Unable to Control     Bowels      Genitourinary:  Denies Blood in Urine, Denies Incontinence, Denies Painful     Urination      Musculoskeletal:  Denies Back Pain, Denies Muscle Pain, Denies Painful Joints      Integumentary:  Denies Itching, Denies Lesions, Denies Rash      Neurologic:  Denies Dizziness, Denies Numbness\Tingling, Denies Seizures      Psychiatric:  Denies Anxiety, Denies Depression      Endocrine:  Denies Cold Intolerance, Denies Heat Intolerance      Hematologic/Lymphatic:  Denies Bruising, Denies Bleeding, Denies Enlarged Lymph     Nodes      Reproductive:  Denies: Menopause, Heavy Periods, Pregnant, Still Menstruating            VITAL SIGNS AND SCORES      Vitals      Weight 156 lbs 11.953 oz / 71.1 kg      Temperature 97.7 F / 36.5 C - Temporal      Pulse 87      Respirations 17      Blood Pressure 99/71 Sitting      Pulse Oximetry 99%, ROOM AIR            Pain Score      Experiencing any pain?:  No      Pain Scale Used:   Numerical      Pain Intensity:  0            Fatigue Score      Experiencing any fatigue?:  No      Fatigue (0-10 scale):  0 (none)            PT/OT Functional Screening      Weakness            Speech Functional Screening      No needs identified            Rehab to be Ordered      Type of Referral to be Ordered:  Patient Declined            EXAM      General: Alert, cooperative, no acute distress, actually well-appearing      Eyes: Anicteric sclera, PERRLA      HEENT: Oropharynx clear, no exudates      Respiratory: CTAB, normal respiratory effort      Abdomen: Normal active bowel sounds, no tenderness, no distention      Cardiovascular: RRR, no murmur, no peripheral edema      Skin: Normal tone, no rash, no lesions      Psychiatric: Appropriate affect, intact judgment      Neurologic: No focal sensory or motor deficits, no weakness, numbness, dizziness      Musculoskeletal: Normal muscle strength, normal muscle tone      Extremities: No clubbing, cyanosis, or deformities            PREVENTION      Hx Influenza Vaccination:  Yes      Date Influenza Vaccine Given:  Oct 2, 2019      Influenza Vaccine Declined:  No      2 or More Falls Past Year?:  No      Fall Past Year with Injury?:  No      Hx Pneumococcal Vaccination:  Yes      Encouraged to follow-up with:  PCP regarding preventative exams.      Chart initiated by      VARGHESE OSPINA MA            ALLERGY/MEDS      Allergies      Coded Allergies:             ADHESIVE TAPE (Verified  Allergy, Unknown, 5/29/20)      Uncoded Allergies:             band aids (Allergy, Unknown, blisters, 5/22/20)            Medications      Last Reconciled on 5/29/20 14:30 by EMERALD MUNIZ      Dronabinol (Dronabinol) 5 Mg Capsule      5 MG PO HS, #30 CAP 3 Refills         Reported         5/22/20       Lidocaine/Prilocaine (Emla) 30 Gm Cream..g.      1 APL TOPICAL ONCE, #2 GM 4 Refills         Prov: KATIE BAIN onc         5/21/20       Lidocaine/Prilocaine (Emla) 30 Gm  Cream..g.      1 APL TOPICAL ONCE, #2 GM 5 Refills         Prov: EMERALD MUNIZ         5/21/20       Prochlorperazine Maleate (Prochlorperazine Maleate) 10 Mg Tab      10 MG PO Q6H PRN for NAUSEA AND/OR VOMITING, #60 TAB 3 Refills         Prov: EMERALD MUNIZ         5/14/20       Ondansetron Odt (ONDANSETRON ODT) 8 Mg Tab.rapdis      8 MG PO Q8H PRN for NAUSEA, #30 TAB.RAPDIS 5 Refills         Prov: EMERALD MUNIZ         5/14/20       Dronabinol (Dronabinol) 5 Mg Capsule      5 MG PO BID, #60 CAP 3 Refills         Reported         5/14/20       Valerian Root (Valerian Root) 100 Mg Capsule      100 MG PO QDAY, CAP         Reported         5/14/20       Multivitamins W-Iron (One Daily Multivitamin) 1 Each Tablet      1 EACH PO QDAY, TAB         Reported         5/14/20       busPIRone HCl (busPIRone HCl) 10 Mg Tablet      10 MG PO BID, #60 TAB         Reported         5/14/20       Prazosin HCl (Prazosin HCl) 2 Mg Capsule      2 MG PO QDAY, #60 CAP 0 Refills         Reported         5/14/20       tiZANidine HCl (tiZANidine HCl) 2 Mg Capsule      2 MG PO BID, CAP         Reported         5/14/20       Sucralfate (Carafate) 1,000 Mg/10 Ml Oral.susp      1000 MG PO BID, #600 ML         Reported         5/14/20       (Creon)   No Conflict Check      1 CAP PO TID         Reported         5/14/20       Melatonin (Melatonin) 5 Mg Tablet      5 MG PO HS PRN for SLEEP, TAB         Reported         3/5/20       Loratadine (Claritin) 10 Mg Tablet      5 MG PO QDAY, #15 TAB 0 Refills         Reported         3/5/20       Sertraline HCl (Zoloft*) 25 Mg Tablet      25 MG PO QDAY         Reported         3/5/20       Hema-Fluticasone (Fluticasone 50 mcg) 16 Gm Spray.susp      2 SPRAYS NARE EACH QDAY         Reported         3/5/20       Insulin Detemir (Levemir VIAL*) 100 Unit/1 Ml Vial      10 UNITS SUBQ QAM         Reported         3/5/20       Omeprazole (Omeprazole*) 40 Mg Capsule      40 MG PO QDAY         Reported          1/17/20       Lisinopril* (Lisinopril*) 20 Mg Tablet      20 MG PO HS, #30 TAB 0 Refills         Reported         1/17/20       traZODone HCl (traZODone HCl) 100 Mg Tablet      100 MG PO HS, #30 TAB 0 Refills         Reported         11/5/18      Medications Reviewed:  No Changes made to meds            IMPRESSION/PLAN      Impression      69-year-old female with pancreatic adenocarcinoma status post Whipple.            Plan      Pancreatic adenocarcinoma: Status post Whipple procedure.  Here today for cycle     1 day 8 of chemotherapy with gemcitabine and Abraxane.  She had a substantial     drop in her cell counts from day 1 of the cycle.  However her counts are still     adequate enough to proceed with treatment.  I will reduce her dose on both     gemcitabine and Abraxane by 20%.  I would likely switch her to a regimen that is     day 1 and 15 going forward.  She will return to clinic in 2 weeks for the next     cycle.            Malnutrition: Patient continues have a poor appetite.  Her electrolytes are     within normal limits today.  She would like to talk with our nutritionist again     regarding alternatives to boost since she is getting tired of it at this point.      She will start Marinol tomorrow.            Hypertension: Patient's last blood pressure was 175/78.  She is on daily     lisinopril.  I told her to monitor her blood pressure several times a day and to     let us know if blood pressures are staying in the 170s systolic.  She does not     have any associated symptoms.            Iron deficiency anemia: Also the patient up for IV iron which can start a soon     as possible.  I will repeat studies in approximately 2 months.            Palpitations: Patient reported new feeling of palpitations in her chest that     last for a few seconds.  She has no associated concerning symptoms such as     lightheadedness or chest pain.  This is likely related to PVCs or PACs but     unlikely to be a  concerning rhythm.            Patient Education      Patient Education Provided:  Yes            Electronically signed by EMERALD MUNIZ  05/29/2020 14:30       Disclaimer: Converted document may not contain table formatting or lab diagrams. Please see [employer] [facility] for the authenticated document.

## 2021-05-28 NOTE — PROGRESS NOTES
Patient: YVROSE DUNCAN     Acct: [id number]     Report: #[id number]  UNIT #: [id number]     : 1950    Encounter Date:2020  PRIMARY CARE: MAYA RICE  ***Signed***  --------------------------------------------------------------------------------------------------------------------  History of Present Illness            Chief Complaint: (PANCREATIC ADENOCARCINOMA, TEACHING)            Yvrose Duncan is presenting for evaluation via Video and Audio conferencing.    Verbal consent obtained via Video and Audio before beginning the visit.            The following staff were present during the visit: (Name and Title for all who     video/audio with patient)                         Past Med History      Ms. Duncan is a 69-year-old female who was referred to me by Dr. Clancy in     gastroenterology for possible pancreatic cancer. She was in clinic today with     her daughter.  She states that she has been having progressively worse symptoms     since early  when she developed significant abdominal pain and decreased     ability to tolerate food.  At the time images were notable for gallstones so she    underwent a cholecystectomy.  However, the pain did not improve.  By 2020 she developed jaundice.  On 2020 patient underwent EUS at [facility] by     Dr. Acevedo.  She is found to have pancreatic head mass, 2.8 x 2.7 cm in size as     well as a common bile duct stricture.  She had a stent placed in the common bile    duct. The patient reports the biopsy was negative and Dr. Acevedo plan to repeat     the procedure in April.  (I have not personally reviewed the pathology report     but spoke with Dr. Clancy who did confirm that the biopsy was negative.)  The     patient states the EUS and biopsy was complicated by pancreatitis, then she     developed thrombosis and some mesenteric vessel.  (I do not have the report of     this finding either.)  Then she developed sepsis secondary to  E. coli bacteremi    a.            Then on 3/5/2020 she had worsening abdominal pain and was readmitted to the     hospital here at [facility].  This is when she was seen by Dr. Clancy and diagnosed     with ascending cholangitis secondary to a blocked stent.  On 3/6/2020 the     patient underwent ERCP with stent exchange.  Bile duct brushing during this     procedure was negative for malignant cells but this is a suboptimal specimen due    to artifact drying.  Culture from the purulent drainage did grow enterococcus.      The patient was successfully treated with antibiotics and discharged home.  She     spoke at length with Dr. Clancy and expressed her wish not to return to [facility]     for further care.  She wishes to be treated locally and have any necessary     procedures done at the [facility].            CT abdomen/pelvis with contrast on 3/5/2020: 1) a common bile stent is noted.     There is more prominent dilitation of the intrahepatic biliary tree and common     bile duct as compared to the prior study.  2) cystic areas within the head of     the pancreas that may relate to pancreatitis or possibly branch type IPMN.  3)     right renal cyst.  Largest measuring 5.4 cm 4) atherosclerotic changes 5)     degenerative changes in the lumbar spine            MRI abdomen + MRCP on 1/17/2020: Previous cholecystectomy with significant     intra-and extrahepatic bile duct dilation.  No evidence for choledocholithiasis.     There is abrupt caliber change in the common bile duct at the level of the head    of the pancreas.  Multiple small cysts in the head of the pancreas could     represent sequelae of chronic pancreatitis or sidebranch IPMN.  Distal common     bile duct stricture from chronic pancreatitis or mass-effect could result in the    appearance of common bile duct.  No enhancing pancreatic mass.            The patient's was referred to Dr. Casillas at the [facility].  She     was admitted for  feeding tube placement and nutritional optimization and pain     control.      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 Perineural invasion present. Staging: pT2, pN2            Status post whipple procedure at [facility] on 4/20/20.             2 Chemotherapy education: Recommendation for Abraxane, Gemcitabine day 1, day 8     every 21 days x 6 cycles.             This is a telehealth / ZOOM video for chemotherapy education encounter with Mrs. Duncan and her daughter who is present as well.             Patient has port placement completed. She will be starting chemotherapy on     5/22/20.             We discussed day of chemotherapy processes. We discussed emesis prevention and     medications. We discussed side effects of chemotherapy including risk for     infection, neutropenic fevers, reporting fevers above 100.4, avoiding large     crowds, cytopenia's, dehydration, electrolyte abnormalities, peripheral     neuropathy, diarrhea, constipation, fatigue, hair loss, nutrition, sun care.             All questions were answered. She and her daughter were given opportunity to ask     additional questions.            Allergies and Medications      Allergies:        Coded Allergies:             ADHESIVE TAPE (Verified  Allergy, Unknown, 5/21/20)      Medications    Last Reconciled on 5/21/20 12:22 by KATIE BAIN      Lidocaine/Prilocaine (Emla) 30 Gm Cream..g.      1 APL TOPICAL ONCE, #2 GM 4 Refills         Prov: KATIE BAIN onc         5/21/20       Lidocaine/Prilocaine (Emla) 30 Gm Cream..g.      1 APL TOPICAL ONCE, #2 GM 5 Refills         Prov: EMERALD MUNIZ         5/21/20       Prochlorperazine Maleate (Prochlorperazine Maleate) 10 Mg Tab      10 MG PO Q6H PRN for NAUSEA AND/OR VOMITING, #60 TAB 3 Refills         Prov: EMERALD MUNIZ          5/14/20       Ondansetron Odt (ONDANSETRON ODT) 8 Mg Tab.rapdis      8 MG PO Q8H PRN for NAUSEA, #30 TAB.RAPDIS 5 Refills         Prov: EMERALD MUNIZ         5/14/20       Dronabinol (Dronabinol) 5 Mg Capsule      5 MG PO BID, #60 CAP 3 Refills         Reported         5/14/20       Valerian Root (Valerian Root) 100 Mg Capsule      100 MG PO QDAY, CAP         Reported         5/14/20       Multivitamins W-Iron (One Daily Multivitamin) 1 Each Tablet      1 EACH PO QDAY, TAB         Reported         5/14/20       busPIRone HCl (busPIRone HCl) 10 Mg Tablet      10 MG PO BID, #60 TAB         Reported         5/14/20       Prazosin HCl (Prazosin HCl) 2 Mg Capsule      2 MG PO QDAY, #60 CAP 0 Refills         Reported         5/14/20       tiZANidine HCl (tiZANidine HCl) 2 Mg Capsule      2 MG PO BID, CAP         Reported         5/14/20       Sucralfate (Carafate) 1,000 Mg/10 Ml Oral.susp      1000 MG PO BID, #600 ML         Reported         5/14/20       (Creon)   No Conflict Check      1 CAP PO TID         Reported         5/14/20       Magnesium Oxide (Magnesium Oxide) 500 Mg Tablet      500 MG PO QDAY, TAB         Reported         3/5/20       Melatonin (Melatonin) 5 Mg Tablet      5 MG PO HS PRN for SLEEP, TAB         Reported         3/5/20       Loratadine (Claritin) 10 Mg Tablet      5 MG PO QDAY, #15 TAB 0 Refills         Reported         3/5/20       Sertraline HCl (Zoloft*) 25 Mg Tablet      25 MG PO QDAY         Reported         3/5/20       Hema-Fluticasone (Fluticasone 50 mcg) 16 Gm Spray.susp      2 SPRAYS NARE EACH QDAY         Reported         3/5/20       Insulin Detemir (Levemir VIAL*) 100 Unit/1 Ml Vial      10 UNITS SUBQ QAM         Reported         3/5/20       Omeprazole (Omeprazole*) 40 Mg Capsule      40 MG PO QDAY         Reported         1/17/20       Lisinopril* (Lisinopril*) 20 Mg Tablet      20 MG PO HS, #30 TAB 0 Refills         Reported         1/17/20       traZODone HCl (traZODone  HCl) 100 Mg Tablet      100 MG PO HS, #30 TAB 0 Refills         Reported         11/5/18            Past Medical,Surg,Family Hx      Past Medical History:  Arthritis, Depression, Diabetes Type 2, High Cholesterol,     Hypertension      Past Surgical History:  Appendectomy, Skin Cancer Removal            Social History      Smoking status:  Never smoker      Currently Vaping:  No            Review of Systems      General:  Admits: Appetite Change (LOSS OF APPETITE), Fatigue;          Denies: Fever, Night Sweats, Weight Gain, Weight Loss      Other General      LOSS OF SLEEP      ENT:  Denies Headache, Denies Hearing Loss, Denies Hoarseness, Denies Sore     Throat      Eye:  Denies Blurred Vision, Denies Corrective Lenses, Denies Diplopia, Denies     Vision Changes      Cardiovascular:  Denies Chest Pain, Denies Palpitations      Respiratory:  Denies: Cough, Coughing Blood, Productive Cough, Shortness of Air,     Wheezing      Gastrointestinal:  Denies Bloody Stools, Denies Constipation, Denies Diarrhea,     Denies Nausea/Vomiting, Denies Problem Swallowing, Denies Unable to Control     Bowels      Genitourinary:  Denies Blood in Urine, Denies Incontinence, Denies Painful     Urination      Musculoskeletal:  Denies Back Pain, Denies Muscle Pain, Denies Painful Joints      Integumentary:  Denies Itching, Denies Lesions, Denies Rash      Neurologic:  Denies Dizziness, Denies Numbness\Tingling, Denies Seizures      Psychiatric:  Denies Anxiety, Denies Depression      Endocrine:  Denies Cold Intolerance, Denies Heat Intolerance      Hematologic/Lymphatic:  Denies Bruising, Denies Bleeding, Denies Enlarged Lymph     Nodes            Exam      Constitutional/Appearance:  Well Nourished, No Acute Distress      Head/Face:  Atraumatic      Eyes:  Extracocular move intact      Respiratory:  Breathing comfortably, No Cough      Skin: General Appearance:  No Visable Rashes on face      Neurologic Orientation:  Grossly orientated  to Person, Place, No Facial Drop      Psychiatric:  Normal Mood, Normal Affect            Plan and Patient Instructions      Ambulatory Assessment/Plan:        Notes      New Medications      * Lidocaine/Prilocaine (Emla) 30 GM CREAM..G.: 1 APL TOPICAL ONCE #2      Plan      1) Pancreatic Cancer: diagnosed: (4/20/20): Invasive moderately to poorly     differentiated adenocarcinoma to pancreatic head: Metastatic carcinoma + to 4 of     18 LN's. Perineural invasion present. Staging: pT2, pN2            Status post whipple procedure at [facility] on 4/20/20.             2 Chemotherapy education: Recommendation for Abraxane, Gemcitabine day 1, day 8     every 21 days x 6 cycles.             This is a telehealth / ZOOM video for chemotherapy education encounter with Mrs. Duncan and her daughter who is present as well.             Patient has port placement completed. She will be starting chemotherapy on     5/22/20.             We discussed day of chemotherapy processes. We discussed emesis prevention and     medications. We discussed side effects of chemotherapy including risk for     infection, neutropenic fevers, reporting fevers above 100.4, avoiding large     crowds, cytopenia's, dehydration, electrolyte abnormalities, peripheral     neuropathy, diarrhea, constipation, fatigue, hair loss, nutrition, sun care.             All questions were answered. She and her daughter were given opportunity to ask     additional questions.             1) Patient will see Dr. Acharya tomorrow prior to chemotherapy.             She will receive prescription for Marinol for loss of appetite tomorrow by Dr. Acharya.             Emla cream without tegaderm sent to her pharmacy.             2) Chemotherapy education completed. All questions answered appropriately.             patient will be given chemotherapy and you booklet and drug information sheets     with chemotherapy visit.      Instructions            1) Patient  will see Dr. Acharya tomorrow prior to chemotherapy.             She will receive prescription for Marinol for loss of appetite tomorrow by Dr. Acharya.             Emla cream without tegaderm sent to her pharmacy.             2) Chemotherapy education completed. All questions answered appropriately.             patient will be given chemotherapy and you booklet and drug information sheets     with chemotherapy visit.             * Chronic conditions reviewed and taken into consideration for today's treatment       plan.      * Patient instructed to seek medical attention urgently for new or worsening       symptoms.      * Patient was educated/instructed on their diagnosis, treatment and medications       prior to discharge from the Video and Audio visit today.            Electronically signed by KATIE BAIN onc  05/21/2020 12:22       Disclaimer: Converted document may not contain table formatting or lab diagrams. Please see [email or web address] for the authenticated document.

## 2021-05-28 NOTE — PROGRESS NOTES
Patient: SAMAN DUNCAN     Acct: [id number]     Report: #[id number]  UNIT #: [id number]     : 1950    Encounter Date:03/10/2020  PRIMARY CARE: MAYA RICE  ***Signed***  --------------------------------------------------------------------------------------------------------------------  NURSE INTAKE      Visit Type      New Patient Visit            Chief Complaint      PANCREATIC CANCER            Referring Provider/Copies To      Referring Provider:  ROYAL WELLS      Copies To:   RYOAL WELLS            History and Present Illness      Past Oncology Illness History      Ms. Duncan is a 69-year-old female who was referred to me by Dr. Wells in     gastroenterology for possible pancreatic cancer. She was in clinic today with     her daughter.  She states that she has been having progressively worse symptoms     since early  when she developed significant abdominal pain and decreased     ability to tolerate food.  At the time images were notable for gallstones so she    underwent a cholecystectomy.  However, the pain did not improve.  By 2020 she developed jaundice.  On 2020 patient underwent EUS at [place] by     Dr. Acevedo.  She is found to have pancreatic head mass, 2.8 x 2.7 cm in size as     well as a common bile duct stricture.  She had a stent placed in the common bile    duct. The patient reports the biopsy was negative and Dr. Acevedo plan to repeat     the procedure in April.  (I have not personally reviewed the pathology report     but spoke with Dr. Wells who did confirm that the biopsy was negative.)  The     patient states the EUS and biopsy was complicated by pancreatitis, then she deve    loped thrombosis and some mesenteric vessel.  (I do not have the report of this     finding either.)  Then she developed sepsis secondary to E. coli bacteremia.            Then on 3/5/2020 she had worsening abdominal pain and was readmitted to the     hospital  here at [facility].  This is when she was seen by Dr. Clancy and diagnosed     with ascending cholangitis secondary to a blocked stent.  On 3/6/2020 the     patient underwent ERCP with stent exchange.  Bile duct brushing during this     procedure was negative for malignant cells but this is a suboptimal specimen due    to artifact drying.  Culture from the purulent drainage did grow enterococcus.      The patient was successfully treated with antibiotics and discharged home.  She     spoke at length with Dr. Clancy and expressed her wish not to return to [place]     for further care.  She wishes to be treated locally and have any necessary     procedures done at the [facility].            CT abdomen/pelvis with contrast on 3/5/2020: 1) a common bile stent is noted.     There is more prominent dilitation of the intrahepatic biliary tree and common     bile duct as compared to the prior study.  2) cystic areas within the head of     the pancreas that may relate to pancreatitis or possibly branch type IPMN.  3)     right renal cyst.  Largest measuring 5.4 cm 4) atherosclerotic changes 5)     degenerative changes in the lumbar spine            MRI abdomen + MRCP on 1/17/2020: Previous cholecystectomy with significant     intra-and extrahepatic bile duct dilation.  No evidence for choledocholithiasis.     There is abrupt caliber change in the common bile duct at the level of the head    of the pancreas.  Multiple small cysts in the head of the pancreas could     represent sequelae of chronic pancreatitis or sidebranch IPMN.  Distal common     bile duct stricture from chronic pancreatitis or mass-effect could result in the    appearance of common bile duct.  No enhancing pancreatic mass.            The patient's biggest complaint is significant abdominal pain in the left upper     quadrant and left flank that radiates across her entire abdomen and to the right    side.  Due to the pain she is now in a wheelchair since  ambulation significantly    worsens the pain.  She is also unable to eat much due to the pain.  She     continues to get weaker.  However, prior to this most recent hospitalization she    was still doing all of her own ADLs including cleaning house and taking herself.            Most Recent Lab Findings      Laboratory Tests      3/8/20 06:08            Laboratory Tests            Test       3/8/20      06:08             Magnesium Level       1.72 mg/dL      (1.60-2.30)            PAST, FAMILY   Past Medical History      Past Medical History:  Arthritis, Depression, Diabetes Type 2, High Cholesterol,    Hypertension      Hematology/Oncology (F):  Pancreatic Cancer            Past Surgical History      Appendectomy, Skin Cancer Removal            HERNIA REPAIR,LEFT OVARY (Left oophrectomy due to possible early      malignancy)            Family History      Family History:  Breast Cancer (AUNT)            Social History      Marital Status:        Lives independently:  Yes      Number of Children:  1            Tobacco Use      Tobacco status:  Never smoker      Currently Vaping:  No            Alcohol Use      Alcohol intake:  None            Substance Use      Substance use:  Denies use            REVIEW OF SYSTEMS      General:  Admits: Fatigue;          Denies: Appetite Change, Fever, Night Sweats, Weight Gain, Weight Loss      Eye:  Denies Blurred Vision, Denies Corrective Lenses, Denies Diplopia, Denies     Vision Changes      ENT:  Denies Headache, Denies Hearing Loss, Denies Hoarseness, Denies Sore T    hroat      Cardiovascular:  Denies Chest Pain, Denies Palpitations      Respiratory:  Denies: Coughing Blood, Productive Cough, Shortness of Air,     Wheezing      Gastrointestinal:  Admits Constipation, Admits Nausea/Vomiting; Denies Bloody     Stools, Denies Diarrhea, Denies Problem Swallowing, Denies Unable to Control     Bowels      Other      Abdominal pain      Genitourinary:  Denies Blood in  Urine, Denies Incontinence, Denies Painful     Urination      Musculoskeletal:  Admits Back Pain; Denies Muscle Pain, Denies Painful Joints      Integumentary:  Denies Itching, Denies Lesions, Denies Rash      Neurologic:  Denies Dizziness, Denies Numbness\Tingling, Denies Seizures      Psychiatric:  Denies Anxiety, Denies Depression      Endocrine:  Denies Cold Intolerance, Denies Heat Intolerance      Hematologic/Lymphatic:  Denies Bruising, Denies Bleeding, Denies Enlarged Lymph     Nodes      Reproductive:  Denies: Menopause, Heavy Periods, Pregnant, Still Menstruating            VITAL SIGNS AND SCORES      Vitals      Height 5 ft 4.57 in / 164 cm      Weight 185 lbs 10.037 oz / 84.2 kg      BSA 1.91 m2      BMI 31.3 kg/m2      Temperature 99.5 F / 37.5 C - Temporal      Pulse 103      Respirations 20      Blood Pressure 145/65 Sitting, Left Arm      Pulse Oximetry 97%, ROOM AIR            Pain Score      Experiencing any pain?:  No      Pain Scale Used:  Numerical      Pain Intensity:  9 (HAD STENTS PLACED IN BILE DUCT LAST WEEK)            Fatigue Score      Experiencing any fatigue?:  No      Fatigue (0-10 scale):  0 (none)            PT/OT Functional Screening      Weakness            Speech Functional Screening      No needs identified            Rehab to be Ordered      Type of Referral to be Ordered:  Patient Declined            EXAM      General: Alert, cooperative, no acute distress, in a wheelchair      Eyes: Anicteric sclera, PERRLA      HEENT: Oropharynx clear, no exudates      Respiratory: CTAB, normal respiratory effort      Abdomen: Normal active bowel sounds, significant tenderness with light     palpation, no distention      Cardiovascular: RRR, no murmur, no peripheral edema      Skin: Normal tone, no rash, no lesions      Psychiatric: Appropriate affect, intact judgment      Neurologic: No focal sensory or motor deficits, no weakness, numbness, dizziness      Musculoskeletal: Normal muscle  strength, normal muscle tone      Extremities: No clubbing, cyanosis, or deformities            PREVENTION      Hx Influenza Vaccination:  Yes      Date Influenza Vaccine Given:  Oct 2, 2019      Influenza Vaccine Declined:  No      2 or More Falls Past Year?:  No      Fall Past Year with Injury?:  No      Hx Pneumococcal Vaccination:  No      Encouraged to follow-up with:  PCP regarding preventative exams.      Chart initiated by      LUISA QUINONES CMA            ALLERGY/MEDS      Allergies      Coded Allergies:             ADHESIVE TAPE (Verified  Allergy, Unknown, 3/10/20)            Medications      Last Reconciled on 3/11/20 22:54 by EMERALD MUNIZ      Amoxicillin/Clavulanate K (Augmentin 875/125 Mg) 1 Each Tablet      875 MG PO BID for 14 Days, #28 TAB 0 Refills         Prov: EmndozaJoanna         3/8/20       Magnesium Oxide (Magnesium Oxide) 500 Mg Tablet      500 MG PO QDAY, TAB         Reported         3/5/20       diphenhydrAMINE HCl (Q-Dryl) 25 Mg Cap      25 MG PO Q6H PRN for SINUSES, CAP         Reported         3/5/20       Melatonin (Melatonin) 5 Mg Tablet      5 MG PO HS PRN for SLEEP, TAB         Reported         3/5/20       Loratadine (Claritin) 10 Mg Tablet      5 MG PO QDAY, #15 TAB 0 Refills         Reported         3/5/20       Sertraline HCl (Zoloft*) 25 Mg Tablet      25 MG PO QDAY         Reported         3/5/20       Apixaban (Eliquis) 5 Mg Tablet      5 MG PO BID         Reported         3/5/20       Hema-Fluticasone (Fluticasone 50 mcg) 16 Gm Spray.susp      2 SPRAYS NARE EACH QDAY         Reported         3/5/20       Insulin Detemir (Levemir VIAL*) 100 Unit/1 Ml Vial      10 UNITS SUBQ QAM         Reported         3/5/20       Omeprazole (Omeprazole*) 40 Mg Capsule      40 MG PO QDAY         Reported         1/17/20       Lisinopril* (Lisinopril*) 20 Mg Tablet      20 MG PO HS, #30 TAB 0 Refills         Reported         1/17/20       traZODone HCl (traZODone HCl) 100 Mg Tablet       100 MG PO HS, #30 TAB 0 Refills         Reported         11/5/18      Medications Reviewed:  No Changes made to meds            IMPRESSION/PLAN      Impression      69-year-old female with cystic abnormalities in the head of the pancreas     concerning for IPMN versus pancreatic cancer            Diagnosis      Pancreatic tumor - D49.0            Notes      New Referrals      * Surgery, As Soon As Possible         Oliverio Messina at [facility] with Oliverio Messina at [facility]         Dx: Pancreatic tumor - D49.0            Plan      Pancreatic head masses with recent biliary obstruction: Patient said to have     biliary obstruction and underwent EUS with stent placement.  Biopsy was     reportedly negative.  This is complicated by pancreatitis and sepsis.  After     recovery she later developed ascending cholangitis and was rehospitalized.  ERCP    with stent exchange was done on 3/6/2020.  Cultures were positive for     enterococcus the brushings were again negative for evidence of malignancy.      Patient wishes to have further procedures done at the [facility].  I    have contacted Dr. Casillas, a surgical oncologist at the [facility], who is agreed to see her as soon as possible for consultation.  The     patient is very tired and does not wish to have repeat labs today and does not     wish to meet with social work and nutrition as we would typically do.  I will     schedule these things for her follow-up appointment which will tentatively be in    1 month.  We will request the records from [place] regarding her previous EUS and    biopsy.            Abdominal pain: Likely secondary to pancreatic mass versus persistent     pancreatitis.  Start the patient on pain medication oxycodone 5 mg every 4 hours    PRN.  I discussed the possibility of meeting with palliative care for the     patient would like to defer this also until the follow-up appointment.             Malnutrition: Secondary to significant abdominal pain from pancreatic mass and     pancreatitis.  Patient has decreased appetite but also inability to eat due to     pain.  I recommend the patient meet with nutrition but she would like to defer     this until follow-up appointment.  I have encouraged her to start     supplementation with boost or Ensure.            Genetics: Current guidelines recommend genetic testing for new patient diagnosed    with pancreatic cancer.  If the patient is diagnosed then we will discuss this     at her follow-up appointment.  I briefly mentioned the concept in clinic today     and the patient is interested.            Patient Education      Patient Education Provided:  Yes            Electronically signed by EMERALD MUNIZ  03/11/2020 22:55       Disclaimer: Converted document may not contain table formatting or lab diagrams. Please see [employer] for the authenticated document.

## 2021-06-17 ENCOUNTER — OFFICE VISIT (OUTPATIENT)
Dept: GASTROENTEROLOGY | Facility: CLINIC | Age: 71
End: 2021-06-17

## 2021-06-17 VITALS
SYSTOLIC BLOOD PRESSURE: 131 MMHG | BODY MASS INDEX: 28.65 KG/M2 | HEIGHT: 64 IN | HEART RATE: 69 BPM | WEIGHT: 167.8 LBS | DIASTOLIC BLOOD PRESSURE: 65 MMHG

## 2021-06-17 DIAGNOSIS — K44.9 HIATAL HERNIA: ICD-10-CM

## 2021-06-17 DIAGNOSIS — K74.60 CIRRHOSIS OF LIVER WITHOUT ASCITES, UNSPECIFIED HEPATIC CIRRHOSIS TYPE (HCC): Primary | ICD-10-CM

## 2021-06-17 DIAGNOSIS — Z85.07 PERSONAL HISTORY OF PANCREATIC CANCER: ICD-10-CM

## 2021-06-17 DIAGNOSIS — Z86.010 PERSONAL HISTORY OF COLONIC POLYPS: ICD-10-CM

## 2021-06-17 DIAGNOSIS — D64.9 ANEMIA, UNSPECIFIED TYPE: ICD-10-CM

## 2021-06-17 PROCEDURE — 99214 OFFICE O/P EST MOD 30 MIN: CPT | Performed by: NURSE PRACTITIONER

## 2021-06-17 RX ORDER — OMEPRAZOLE 40 MG/1
CAPSULE, DELAYED RELEASE ORAL
COMMUNITY
Start: 2021-03-22 | End: 2022-08-19 | Stop reason: SDUPTHER

## 2021-06-17 RX ORDER — ATORVASTATIN CALCIUM 20 MG/1
20 TABLET, FILM COATED ORAL NIGHTLY
COMMUNITY
Start: 2021-04-14

## 2021-06-17 RX ORDER — SERTRALINE HYDROCHLORIDE 100 MG/1
TABLET, FILM COATED ORAL
COMMUNITY
Start: 2021-05-22 | End: 2022-08-19 | Stop reason: SDUPTHER

## 2021-06-17 RX ORDER — PRAZOSIN HYDROCHLORIDE 2 MG/1
2 CAPSULE ORAL NIGHTLY
COMMUNITY
Start: 2021-05-27

## 2021-06-17 RX ORDER — TURMERIC 400 MG
CAPSULE ORAL
COMMUNITY
End: 2022-09-19

## 2021-06-17 RX ORDER — TRAZODONE HYDROCHLORIDE 100 MG/1
TABLET ORAL
Status: ON HOLD | COMMUNITY
Start: 2021-05-12 | End: 2022-09-20

## 2021-06-17 RX ORDER — FLUTICASONE PROPIONATE 50 MCG
SPRAY, SUSPENSION (ML) NASAL
COMMUNITY
Start: 2021-05-12 | End: 2022-09-14 | Stop reason: SDUPTHER

## 2021-06-17 RX ORDER — PANCRELIPASE 30000; 6000; 19000 [USP'U]/1; [USP'U]/1; [USP'U]/1
18000 CAPSULE, DELAYED RELEASE PELLETS ORAL 3 TIMES DAILY
COMMUNITY
Start: 2021-05-26

## 2021-06-17 RX ORDER — LORATADINE 10 MG/1
10 TABLET ORAL DAILY
COMMUNITY
Start: 2021-05-22

## 2021-06-17 RX ORDER — DULAGLUTIDE 0.75 MG/.5ML
INJECTION, SOLUTION SUBCUTANEOUS
COMMUNITY
End: 2022-09-19

## 2021-06-17 RX ORDER — BUSPIRONE HYDROCHLORIDE 10 MG/1
10 TABLET ORAL 2 TIMES DAILY
COMMUNITY
Start: 2021-05-19

## 2021-06-17 RX ORDER — MAGNESIUM 64 MG (MAGNESIUM CHLORIDE) TABLET,DELAYED RELEASE
DAILY
COMMUNITY
Start: 2021-06-09 | End: 2022-08-19 | Stop reason: SDUPTHER

## 2021-06-17 RX ORDER — INSULIN DETEMIR 100 [IU]/ML
INJECTION, SOLUTION SUBCUTANEOUS
COMMUNITY
Start: 2021-04-21

## 2021-06-17 NOTE — PROGRESS NOTES
Patient Name: Yvrose Duncan   Visit Date: 06/17/2021   Patient ID: [id number]  Provider: GAGE Holman    Sex: female  Location:  Location Address:  Location Phone: [address]  [place] [address] [phone]    YOB: 1950      Primary Care Provider Brock Quach PA      Referring Provider: No ref. provider found        Chief Complaint  No chief complaint on file.    History of Present Illness    Dakota is a 70 year old /White female who presents to the office today.         Pt dx  w pancreatic cancer and had [facility] 4/2020 , path with invasive moderately to poorly differentiated adenocarcinoma of the pancreas as well as 5 of 21 lymph nodes, perineural invasion present. She has seen Dr Acharya and had chemo.  Dr. Clancy saw patient in March 2020 when she was admitted with ascending cholangitis, suspicion of pancreatic cancer at that time.     EGD (persist. HB) colonoscopy 10/27/2020: Medium-sized hiatal hernia, evidence of previous antrectomy gastrojejunostomy was seen, bile reflux related gastritis noted, stomach bx w mild chronic inactive gastritis, normal afferent and efferent loops; good prep, normal mucosa throughout the colon, 14 mm juvenile retention polyp removed from the ascending colon. Random colon bx negative.  Plan to repeat in 3 years    Patient was last seen April 2021 and had no GI complaints other than feeling an abdominal hernia and was wearing elastic pants to be more comfortable.  New diagnosis of cirrhosis per CT in March was discussed with patient and hepatic work-up was ordered.  General surgery was consulted regarding abdominal hernias.    4/20/2021: Hepatic w/u completely negative and r/w pt  4/20/21: FibroSure: F3, S3, N2    Today, pt states she was hospitalized 5/30-6/1 w hyponatremia, thought r/t vomiting, cause unknown. Pt states she is doing well now, no abd pain, no vomiting. No HB w Omeprazole. BM's moving normally. Pt states she  saw Dr Sofia, but he did not recommend surgery for hernias.  Pt is managing well currently.   CT during hospitalization 5/30/21 unremarkable (?concern for PNA pt states was r/o) liver appeared normal.   Anemia noted, see labs below. Iron Normal. Has f/u apt w Dr Acharya this month.   Pt has intentionally lost 10#. R/W pt this is goal w fatty liver/cirrhosis. R/W pt hepatic w/u.          Past Medical History:   Diagnosis Date   • Diabetes (CMS/HCC)    • Essential hypertension    • Gallstones    • Heartburn    • History of pancreatic cancer    • No family history of colorectal cancer    • Pancreatic cancer (CMS/HCC)    • Sleep apnea        Past Surgical History:   Procedure Laterality Date   • APPENDECTOMY     • COLONOSCOPY     • ERCP     • FOOT SURGERY     • GALLBLADDER SURGERY     • HERNIA REPAIR     • OOPHORECTOMY     • UPPER GASTROINTESTINAL ENDOSCOPY     • WHIPPLE PROCEDURE           Current Outpatient Medications:   •  atorvastatin (LIPITOR) 20 MG tablet, Take 20 mg by mouth Every Night., Disp: , Rfl:   •  busPIRone (BUSPAR) 10 MG tablet, Take 10 mg by mouth 2 (Two) Times a Day., Disp: , Rfl:   •  Creon 6000-68608 units capsule delayed-release particles capsule, Take 18,000 units of lipase by mouth 3 (Three) Times a Day., Disp: , Rfl:   •  Dulaglutide (Trulicity) 0.75 MG/0.5ML solution pen-injector, , Disp: , Rfl:   •  fluticasone (FLONASE) 50 MCG/ACT nasal spray, , Disp: , Rfl:   •  Levemir 100 UNIT/ML injection, INJECT 10 UNITS SUBCUTANEOUSLY ONCE DAILY, Disp: , Rfl:   •  loratadine (CLARITIN) 10 MG tablet, Take 10 mg by mouth Daily., Disp: , Rfl:   •  Mag64 64 MG DR tablet, Take  by mouth Daily., Disp: , Rfl:   •  metFORMIN (GLUCOPHAGE) 1000 MG tablet, , Disp: , Rfl:   •  omeprazole (priLOSEC) 40 MG capsule, , Disp: , Rfl:   •  prazosin (MINIPRESS) 2 MG capsule, Take 2 mg by mouth Every Night., Disp: , Rfl:   •  sertraline (ZOLOFT) 100 MG tablet, TAKE 1 2 (ONE HALF) TABLET BY MOUTH ONCE DAILY, Disp: , Rfl:  "  •  traZODone (DESYREL) 100 MG tablet, , Disp: , Rfl:   •  Turmeric 400 MG capsule, turmeric 400 mg oral capsule take 1 capsule by oral route daily   Active, Disp: , Rfl:      Allergies   Allergen Reactions   • Adhesive Tape Hives and Itching     Blisters   • Latex Other (See Comments)     Blistering of skin       Family History   Problem Relation Age of Onset   • Heart attack Mother    • Diabetes Mother    • Heart disease Mother    • Heart attack Father    • Colon cancer Neg Hx         Social History     Tobacco Use   • Smoking status: Never Smoker   Vaping Use   • Vaping Use: Never used   Substance Use Topics   • Alcohol use: Never   • Drug use: Not on file       Objective     Vital Signs:   /65 (BP Location: Left arm, Patient Position: Sitting, Cuff Size: Adult)   Pulse 69   Ht 162.6 cm (64\")   Wt 76.1 kg (167 lb 12.8 oz)   BMI 28.80 kg/m²       Physical Exam  Constitutional:       General: He is not in acute distress.     Appearance: Normal appearance.   HENT:      Head: Normocephalic and atraumatic.      Nose: Nose normal.   Pulmonary:      Effort: Pulmonary effort is normal. No respiratory distress.   Abdominal:      General: Abdomen is flat.      Palpations: Abdomen is soft. There is no mass.      Tenderness: There is no abdominal tenderness. There is no guarding.   Musculoskeletal:      Cervical back: Neck supple.      Right lower leg: No edema.      Left lower leg: No edema.   Skin:     General: Skin is warm and dry.   Neurological:      General: No focal deficit present.      Mental Status: He is alert and oriented to person, place, and time.      Gait: Gait normal.   Psychiatric:         Mood and Affect: Mood normal.         Speech: Speech normal.         Behavior: Behavior normal.         Thought Content: Thought content normal.     Result Review :     CMP    CMP 5/30/21 5/30/21 5/31/21 5/31/21 5/31/21 5/31/21 6/1/21    1240 2026 0156 0545 1400 1936    Glucose 152 (A) 142 (A) 144 (A) 173 (A) " 150 (A) 193 (A) 166 (A)   BUN 15 14 12 12 12 14 12   Creatinine 0.86 0.81 0.81 0.83 0.79 0.87 0.83   Sodium 120 (A) 121 (A) 122 (A) 124 (A) 123 (A) 125 (A) 127 (A)   Potassium 4.3 4.1 4.1 4.3 4.0 4.0 4.2   Chloride 83 (A) 83 (A) 88 (A) 89 (A) 89 (A) 90 (A) 95 (A)   Calcium 9.0 9.0 8.8 8.9 8.7 8.8 8.6 (A)   Albumin 4.3         Total Bilirubin 0.77         Alkaline Phosphatase 125         AST (SGOT) 30         ALT (SGPT) 25         (A) Abnormal value       Comments are available for some flowsheets but are not being displayed.           CBC    CBC 5/30/21 5/31/21 6/1/21   WBC 10.67 9.85 8.18   RBC 3.75 (A) 3.73 (A) 3.41 (A)   Hemoglobin 11.6 (A) 11.6 (A) 10.4 (A)   Hematocrit 30.9 (A) 30.9 (A) 28.8 (A)   MCV 82.4 82.8 84.5   MCH 30.9 31.1 (A) 30.5   MCHC 37.5 (A) 37.5 (A) 36.1   RDW 12.3 12.2 12.2   Platelets 198 189 150   (A) Abnormal value                      Assessment and Plan    Diagnoses and all orders for this visit:    1. Cirrhosis of liver without ascites, unspecified hepatic cirrhosis type (CMS/HCC) (Primary)  Comments:  likely r/t fatty liver. Liver w/u negative  Orders:  -     CBC (No Diff); Future  -     AFP Tumor Marker; Future  -     Protime-INR; Future  -     Comprehensive Metabolic Panel; Future  -     US Liver; Future    2. Anemia, unspecified type  Comments:  normal Iron    3. Personal history of pancreatic cancer    4. Hiatal hernia    5. Personal history of colonic polyps          Follow Up   -Recall colon 3 yrs  -Recall EGD 10/2021--screen for EV  -Labs/US due in Dec--d/w pt we will monitor liver q6mo  -R/W pt low carb diet, cont w wt loss  --Call or return to clinic PRN if any change in bowel pattern, blood in stool, or new GI sx.   -F/U 6 mo  Patient was given instructions and counseling regarding her condition or for health maintenance advice. Please see specific information pulled into the AVS if appropriate.

## 2021-06-18 ENCOUNTER — TELEPHONE (OUTPATIENT)
Dept: ONCOLOGY | Facility: HOSPITAL | Age: 71
End: 2021-06-18

## 2021-06-18 DIAGNOSIS — C25.9 MALIGNANT NEOPLASM OF PANCREAS, UNSPECIFIED LOCATION OF MALIGNANCY (HCC): Primary | ICD-10-CM

## 2021-06-25 ENCOUNTER — APPOINTMENT (OUTPATIENT)
Dept: CT IMAGING | Facility: HOSPITAL | Age: 71
End: 2021-06-25

## 2021-06-30 PROBLEM — G47.30 SLEEP APNEA: Status: ACTIVE | Noted: 2021-06-30

## 2021-06-30 PROBLEM — H25.13 AGE-RELATED NUCLEAR CATARACT, BILATERAL: Status: ACTIVE | Noted: 2020-02-19

## 2021-06-30 PROBLEM — C25.0 MALIGNANT NEOPLASM OF HEAD OF PANCREAS: Status: ACTIVE | Noted: 2020-01-21

## 2021-06-30 PROBLEM — R65.20 SEVERE SEPSIS (HCC): Status: ACTIVE | Noted: 2020-02-06

## 2021-06-30 PROBLEM — R12 HEARTBURN: Status: ACTIVE | Noted: 2021-06-30

## 2021-06-30 PROBLEM — E87.1 HYPONATREMIA: Status: ACTIVE | Noted: 2021-06-07

## 2021-06-30 PROBLEM — H52.13 MYOPIA, BILATERAL: Status: ACTIVE | Noted: 2020-02-19

## 2021-06-30 PROBLEM — E11.9 DIABETES MELLITUS (HCC): Status: ACTIVE | Noted: 2020-01-18

## 2021-06-30 PROBLEM — E83.42 HYPOMAGNESEMIA: Status: ACTIVE | Noted: 2021-06-07

## 2021-06-30 PROBLEM — I81 PORTAL VEIN THROMBOSIS: Status: ACTIVE | Noted: 2020-02-05

## 2021-06-30 PROBLEM — K80.20 GALLSTONES: Status: ACTIVE | Noted: 2021-06-30

## 2021-06-30 PROBLEM — R79.89 ELEVATED LFTS: Status: ACTIVE | Noted: 2020-01-18

## 2021-06-30 PROBLEM — A41.50 GRAM NEGATIVE SEPTICEMIA (HCC): Status: ACTIVE | Noted: 2020-02-06

## 2021-06-30 PROBLEM — I10 ESSENTIAL (PRIMARY) HYPERTENSION: Status: ACTIVE | Noted: 2018-09-11

## 2021-06-30 PROBLEM — K86.81 EXOCRINE PANCREATIC INSUFFICIENCY: Status: ACTIVE | Noted: 2020-05-04

## 2021-06-30 PROBLEM — F41.9 ANXIETY: Status: ACTIVE | Noted: 2020-05-04

## 2021-06-30 PROBLEM — A41.9 SEVERE SEPSIS (HCC): Status: ACTIVE | Noted: 2020-02-06

## 2021-06-30 PROBLEM — H52.4 PRESBYOPIA: Status: ACTIVE | Noted: 2020-02-19

## 2021-06-30 PROBLEM — E11.9 TYPE 2 DIABETES MELLITUS WITHOUT COMPLICATION (HCC): Status: ACTIVE | Noted: 2018-09-11

## 2021-06-30 RX ORDER — CARVEDILOL 6.25 MG/1
TABLET ORAL
COMMUNITY
End: 2022-09-19

## 2021-07-01 ENCOUNTER — OFFICE VISIT (OUTPATIENT)
Dept: ONCOLOGY | Facility: HOSPITAL | Age: 71
End: 2021-07-01

## 2021-07-01 VITALS
OXYGEN SATURATION: 100 % | WEIGHT: 166.01 LBS | DIASTOLIC BLOOD PRESSURE: 68 MMHG | HEART RATE: 77 BPM | RESPIRATION RATE: 16 BRPM | SYSTOLIC BLOOD PRESSURE: 144 MMHG | BODY MASS INDEX: 28.49 KG/M2 | TEMPERATURE: 98.6 F

## 2021-07-01 DIAGNOSIS — C25.9 MALIGNANT NEOPLASM OF PANCREAS, UNSPECIFIED LOCATION OF MALIGNANCY (HCC): Primary | ICD-10-CM

## 2021-07-01 PROCEDURE — 99213 OFFICE O/P EST LOW 20 MIN: CPT | Performed by: INTERNAL MEDICINE

## 2021-07-01 PROCEDURE — G0463 HOSPITAL OUTPT CLINIC VISIT: HCPCS

## 2021-07-01 RX ORDER — SODIUM CHLORIDE 1000 MG
1 TABLET, SOLUBLE MISCELLANEOUS 3 TIMES DAILY
Status: ON HOLD | COMMUNITY
End: 2022-09-20

## 2021-07-01 NOTE — PROGRESS NOTES
Yvrose Duncan   : 1950     LOCATION: [facility] HEMATOLOGY & ONCOLOGY     Chief Complaint  Pancreatic Cancer (-follow up)    Referring Provider: Brock Quach, *  PCP: Brock Quach PA    Oncology/Hematology History Overview Note   Ms. Duncan is a 70-year-old female with pancreatic cancer.     Presented with abdomnial pain and jaundice. 2020 patient underwent EUS at [place] by Dr. Acevedo, pancreatic head mass, 2.8 x 2.7 cm in size as well as a common bile duct stricture.  She had a stent placed in the common bile duct. Biopsy was negative. The procedure was complicated by pancreatitis, mesenteric vein thrombosis and then E coli sepsis.       Then on 3/5/2020 she had worsening abdominal pain and was readmitted to [facility] with ascending cholangitis secondary to a blocked stent.  On 3/6/2020 the patient underwent ERCP with stent exchange by Dr. Clancy. CT abdomen/pelvis showed cystic areas within the head of the pancreas that may relate to pancreatitis or possibly branch type IPMN.     Referred to Dr. Casillas at the [facility].  She was admitted for feeding tube placement and nutritional optimization and pain control.  Whipple procedure on 4/15/2020.  Pathology revealed invasive moderately to poorly differentiated adenocarcinoma in the pancreas as well as an 5 of 21 lymph nodes.  She also had perineural invasion present.  Pathologic stage was pT2pN2.      Cycle 1 of adjuvant chemotherapy with gemcitabine and Abraxane on 2020.  Cycle 1 day 8 on 2020.  Cycle 2-day 1 on 6/15/2020, started doing treatment on days 1 and 15 of a q. 28-day cycle, C2D15 on 20  Cycle 3-day 1 on 2020  Cycle 4-day 1 on 8/10/2020    Iron deficiency anemia: Patient was treated with Injectafer on  and 15.     on 3/7/20 was elevated at 273 and improved to 27.3 on 20.    CT scan on 20: No evidence of metastatic disease in the chest, abdomen, or  pelvis.  Postoperative changes from Whipple procedure are noted.  CT scan on 3/12/2021: Previous Whipple procedure. No definite evidence for active malignancy in the chest, abdomen, or pelvis.     Malignant neoplasm of head of pancreas (CMS/HCC)   1/21/2020 Initial Diagnosis    Malignant neoplasm of head of pancreas (CMS/HCC)     4/15/2020 Surgery    Surgery       Procedure:  Whipple by Dr. Casillas at [facility]: Pathology revealed invasive moderately to poorly differentiated adenocarcinoma in the pancreas as well as an 5 of 21 lymph nodes.  She also had perineural invasion present.  Pathologic stage was pT2pN2.         5/22/2020 - 8/10/2020 Chemotherapy    Gemcitabine and Abraxane x 4 cycles         Subjective        History of Present Illness   Pt here for f/u post completion of adjuvant therapy for her pancreatic cancer  Continues to feel well   weight is stable    appetite is good   denies any abdominal pain   was hospitalized in may 2021 for hyponatremia   NA+ rechecked yesterday and ok per pt    Review of Systems   Psychiatric/Behavioral: The patient is nervous/anxious.      Current Outpatient Medications on File Prior to Visit   Medication Sig Dispense Refill   • atorvastatin (LIPITOR) 20 MG tablet Take 20 mg by mouth Every Night.     • busPIRone (BUSPAR) 10 MG tablet Take 10 mg by mouth 2 (Two) Times a Day.     • carvedilol (COREG) 6.25 MG tablet carvedilol 6.25 mg oral tablet take 1 tablet (6.25 mg) by oral route 2 times per day with food   Suspended     • Creon 6000-19689 units capsule delayed-release particles capsule Take 18,000 units of lipase by mouth 3 (Three) Times a Day.     • Dulaglutide (Trulicity) 0.75 MG/0.5ML solution pen-injector      • fluticasone (FLONASE) 50 MCG/ACT nasal spray      • Levemir 100 UNIT/ML injection INJECT 10 UNITS SUBCUTANEOUSLY ONCE DAILY     • loratadine (CLARITIN) 10 MG tablet Take 10 mg by mouth Daily.     • Mag64 64 MG DR tablet Take  by mouth Daily.     •  Magnesium 65 MG tablet Take  by mouth.     • metFORMIN (GLUCOPHAGE) 1000 MG tablet      • omeprazole (priLOSEC) 40 MG capsule      • prazosin (MINIPRESS) 2 MG capsule Take 2 mg by mouth Every Night.     • sertraline (ZOLOFT) 100 MG tablet TAKE 1 2 (ONE HALF) TABLET BY MOUTH ONCE DAILY     • sodium chloride 1 g tablet Take 1 g by mouth 3 (Three) Times a Day.     • traZODone (DESYREL) 100 MG tablet      • Turmeric 400 MG capsule turmeric 400 mg oral capsule take 1 capsule by oral route daily   Active       No current facility-administered medications on file prior to visit.       Allergies   Allergen Reactions   • Adhesive Tape Hives and Itching     Blisters   • Latex Other (See Comments)     Blistering of skin       Past Medical History:   Diagnosis Date   • Diabetes (CMS/HCC)    • Essential hypertension    • Gallstones    • Heartburn    • History of pancreatic cancer    • No family history of colorectal cancer    • Pancreatic cancer (CMS/HCC)    • Sleep apnea      Past Surgical History:   Procedure Laterality Date   • APPENDECTOMY     • COLONOSCOPY     • ERCP     • FOOT SURGERY     • GALLBLADDER SURGERY     • HERNIA REPAIR     • OOPHORECTOMY     • UPPER GASTROINTESTINAL ENDOSCOPY     • WHIPPLE PROCEDURE       Social History     Socioeconomic History   • Marital status:      Spouse name: Not on file   • Number of children: Not on file   • Years of education: Not on file   • Highest education level: Not on file   Tobacco Use   • Smoking status: Never Smoker   Vaping Use   • Vaping Use: Never used   Substance and Sexual Activity   • Alcohol use: Never   • Sexual activity: Defer     Family History   Problem Relation Age of Onset   • Heart attack Mother    • Diabetes Mother    • Heart disease Mother    • Heart attack Father    • Colon cancer Neg Hx        There is no immunization history on file for this patient.    Objective     /68   Pulse 77   Temp 98.6 °F (37 °C)   Resp 16   Wt 75.3 kg (166 lb 0.1  oz)   SpO2 100%   BMI 28.49 kg/m²       Physical Exam  Constitutional:       Appearance: Normal appearance.   HENT:      Head: Normocephalic.      Mouth/Throat:      Mouth: Mucous membranes are moist.   Eyes:      Pupils: Pupils are equal, round, and reactive to light.   Cardiovascular:      Rate and Rhythm: Regular rhythm.   Pulmonary:      Breath sounds: Normal breath sounds.   Abdominal:      General: Bowel sounds are normal.      Palpations: Abdomen is soft.   Musculoskeletal:         General: Normal range of motion.      Cervical back: Normal range of motion.   Skin:     General: Skin is warm and dry.   Neurological:      Mental Status: She is alert.   Psychiatric:         Mood and Affect: Mood normal.               Iron   Date Value Ref Range Status   04/20/2021 108 60 - 170 ug/dL Final   05/22/2020 27 (L) 60 - 170 ug/dL Final     Iron Saturation   Date Value Ref Range Status   04/20/2021 31 20 - 55 % Final   05/22/2020 5 (L) 20 - 55 % Final     Transferrin   Date Value Ref Range Status   04/20/2021 240.00 (L) 250.00 - 380.00 mg/dL Final   05/22/2020 351.00 250.00 - 380.00 mg/dL Final     TIBC   Date Value Ref Range Status   04/20/2021 343 245 - 450 ug/dL Final     Comment:     As of 10/15/03, the chemistry department began utilizing a Transferrin  assay. Data suggests that Transferrin is a better estimate of Total Iron  binding capacity than the TIBC assay. As a result the TIBC will now be a  calculated estimate from the measured Transferrin.     05/22/2020 502 (H) 245 - 450 ug/dL Final     Comment:     As of 10/15/03, the chemistry department began utilizing a Transferrin  assay. Data suggests that Transferrin is a better estimate of Total Iron  binding capacity than the TIBC assay. As a result the TIBC will now be a  calculated estimate from the measured Transferrin.       Ferritin   Date Value Ref Range Status   04/20/2021 391 (H) 10 - 200 ng/mL Final     Comment:     <10 ng/ml usually associated with  Iron Deficiency Anemia.  Above normal range levels may be due to Hepatic and/or  Chronic Inflammatory Disease.     05/22/2020 23 10 - 200 ng/mL Final     Comment:     <10 ng/ml usually associated with Iron Deficiency Anemia.  Above normal range levels may be due to Hepatic and/or  Chronic Inflammatory Disease.                   PHQ-9 Total Score: 0         Result Review :   The following data was reviewed by: Michelle Eller MD on 07/01/2021:  Lab Results   Component Value Date    HGB 10.4 (L) 06/01/2021    HCT 28.8 (L) 06/01/2021    MCV 84.5 06/01/2021     06/01/2021    WBC 8.18 06/01/2021    NEUTROABS 4.96 06/01/2021    LYMPHSABS 2.23 06/01/2021    MONOSABS 0.70 06/01/2021    EOSABS 0.22 06/01/2021    BASOSABS 0.03 06/01/2021     Lab Results   Component Value Date    BUN 12 06/01/2021    CREATININE 0.83 06/01/2021     (L) 06/01/2021    K 4.2 06/01/2021    CL 95 (L) 06/01/2021    CO2 23 06/01/2021    CALCIUM 8.6 (L) 06/01/2021    PROTEINTOT 7.3 05/30/2021    ALBUMIN 4.3 05/30/2021    BILITOT 0.77 05/30/2021    ALKPHOS 125 05/30/2021    AST 30 05/30/2021    ALT 25 05/30/2021         Data reviewed: Radiologic studies labs          Assessment and Plan      ASSESSMENT  Hx of pancreatic cancer  Clinically stable    Labs and ca19-9 and f/u scans ordered for 8/21   OV post scans  PLAN/RECOMMENDATIONS      Diagnoses and all orders for this visit:    1. Malignant neoplasm of pancreas, unspecified location of malignancy (CMS/HCC) (Primary)  -     Cancer Antigen 19-9; Future  -     Comprehensive Metabolic Panel; Future  -     CBC & Differential; Future  -     CT Abdomen Pelvis With Contrast; Future  -     Cancer Antigen 19-9; Future  -     Comprehensive Metabolic Panel; Future        Patient was given instructions and counseling regarding her condition or for health maintenance advice. Please see specific information pulled into the AVS if appropriate.     Michelle Eller MD    7/1/2021

## 2021-07-01 NOTE — PROGRESS NOTES
Yvrose Duncan   : 1950     LOCATION: [facility] HEMATOLOGY & ONCOLOGY     Chief Complaint  Pancreatic Cancer (-follow up)    Referring Provider: Brock Quach, *  PCP: Brock Quach PA    Oncology/Hematology History Overview Note   Ms. Duncan is a 70-year-old female with pancreatic cancer.     Presented with abdomnial pain and jaundice. 2020 patient underwent EUS at [place] by Dr. Acevedo, pancreatic head mass, 2.8 x 2.7 cm in size as well as a common bile duct stricture.  She had a stent placed in the common bile duct. Biopsy was negative. The procedure was complicated by pancreatitis, mesenteric vein thrombosis and then E coli sepsis.       Then on 3/5/2020 she had worsening abdominal pain and was readmitted to [facility] with ascending cholangitis secondary to a blocked stent.  On 3/6/2020 the patient underwent ERCP with stent exchange by Dr. Clancy. CT abdomen/pelvis showed cystic areas within the head of the pancreas that may relate to pancreatitis or possibly branch type IPMN.     Referred to Dr. Casillas at the [facility].  She was admitted for feeding tube placement and nutritional optimization and pain control.  Whipple procedure on 4/15/2020.  Pathology revealed invasive moderately to poorly differentiated adenocarcinoma in the pancreas as well as an 5 of 21 lymph nodes.  She also had perineural invasion present.  Pathologic stage was pT2pN2.      Cycle 1 of adjuvant chemotherapy with gemcitabine and Abraxane on 2020.  Cycle 1 day 8 on 2020.  Cycle 2-day 1 on 6/15/2020, started doing treatment on days 1 and 15 of a q. 28-day cycle, C2D15 on 20  Cycle 3-day 1 on 2020  Cycle 4-day 1 on 8/10/2020    Iron deficiency anemia: Patient was treated with Injectafer on  and 15.     on 3/7/20 was elevated at 273 and improved to 27.3 on 20.    CT scan on 20: No evidence of metastatic disease in the chest, abdomen, or  pelvis.  Postoperative changes from Whipple procedure are noted.  CT scan on 3/12/2021: Previous Whipple procedure. No definite evidence for active malignancy in the chest, abdomen, or pelvis.     Malignant neoplasm of head of pancreas (CMS/HCC)   1/21/2020 Initial Diagnosis    Malignant neoplasm of head of pancreas (CMS/HCC)     4/15/2020 Surgery    Surgery       Procedure:  Whipple by Dr. Casillas at [facility]: Pathology revealed invasive moderately to poorly differentiated adenocarcinoma in the pancreas as well as an 5 of 21 lymph nodes.  She also had perineural invasion present.  Pathologic stage was pT2pN2.         5/22/2020 - 8/10/2020 Chemotherapy    Gemcitabine and Abraxane x 4 cycles         Subjective        History of Present Illness   Pt here for f/u post completion of adjuvant therapy for her pancreatic cancer  Continues to feel well   weight is stable    appetite is good   denies any abdominal pain   was hospitalized in may 2021 for hyponatremia   NA+ rechecked yesterday and ok per pt    Review of Systems   Psychiatric/Behavioral: The patient is nervous/anxious.      Current Outpatient Medications on File Prior to Visit   Medication Sig Dispense Refill   • atorvastatin (LIPITOR) 20 MG tablet Take 20 mg by mouth Every Night.     • busPIRone (BUSPAR) 10 MG tablet Take 10 mg by mouth 2 (Two) Times a Day.     • carvedilol (COREG) 6.25 MG tablet carvedilol 6.25 mg oral tablet take 1 tablet (6.25 mg) by oral route 2 times per day with food   Suspended     • Creon 6000-08804 units capsule delayed-release particles capsule Take 18,000 units of lipase by mouth 3 (Three) Times a Day.     • Dulaglutide (Trulicity) 0.75 MG/0.5ML solution pen-injector      • fluticasone (FLONASE) 50 MCG/ACT nasal spray      • Levemir 100 UNIT/ML injection INJECT 10 UNITS SUBCUTANEOUSLY ONCE DAILY     • loratadine (CLARITIN) 10 MG tablet Take 10 mg by mouth Daily.     • Mag64 64 MG DR tablet Take  by mouth Daily.     •  metFORMIN (GLUCOPHAGE) 1000 MG tablet      • omeprazole (priLOSEC) 40 MG capsule      • prazosin (MINIPRESS) 2 MG capsule Take 2 mg by mouth Every Night.     • sertraline (ZOLOFT) 100 MG tablet TAKE 1 2 (ONE HALF) TABLET BY MOUTH ONCE DAILY     • traZODone (DESYREL) 100 MG tablet      • Turmeric 400 MG capsule turmeric 400 mg oral capsule take 1 capsule by oral route daily   Active       No current facility-administered medications on file prior to visit.       Allergies   Allergen Reactions   • Adhesive Tape Hives and Itching     Blisters   • Latex Other (See Comments)     Blistering of skin       Past Medical History:   Diagnosis Date   • Diabetes (CMS/HCC)    • Essential hypertension    • Gallstones    • Heartburn    • History of pancreatic cancer    • No family history of colorectal cancer    • Pancreatic cancer (CMS/HCC)    • Sleep apnea      Past Surgical History:   Procedure Laterality Date   • APPENDECTOMY     • COLONOSCOPY     • ERCP     • FOOT SURGERY     • GALLBLADDER SURGERY     • HERNIA REPAIR     • OOPHORECTOMY     • UPPER GASTROINTESTINAL ENDOSCOPY     • WHIPPLE PROCEDURE       Social History     Socioeconomic History   • Marital status:      Spouse name: Not on file   • Number of children: Not on file   • Years of education: Not on file   • Highest education level: Not on file   Tobacco Use   • Smoking status: Never Smoker   Vaping Use   • Vaping Use: Never used   Substance and Sexual Activity   • Alcohol use: Never   • Sexual activity: Defer     Family History   Problem Relation Age of Onset   • Heart attack Mother    • Diabetes Mother    • Heart disease Mother    • Heart attack Father    • Colon cancer Neg Hx        There is no immunization history on file for this patient.    Objective     /68   Pulse 77   Temp 98.6 °F (37 °C)   Resp 16   Wt 75.3 kg (166 lb 0.1 oz)   SpO2 100%   BMI 28.49 kg/m²       Physical Exam  Constitutional:       Appearance: Normal appearance.   HENT:       Head: Normocephalic.      Mouth/Throat:      Mouth: Mucous membranes are moist.   Eyes:      Pupils: Pupils are equal, round, and reactive to light.   Cardiovascular:      Rate and Rhythm: Regular rhythm.   Pulmonary:      Breath sounds: Normal breath sounds.   Abdominal:      General: Bowel sounds are normal.      Palpations: Abdomen is soft.   Musculoskeletal:         General: Normal range of motion.      Cervical back: Normal range of motion.   Skin:     General: Skin is warm and dry.   Neurological:      Mental Status: She is alert.   Psychiatric:         Mood and Affect: Mood normal.               Iron   Date Value Ref Range Status   04/20/2021 108 60 - 170 ug/dL Final   05/22/2020 27 (L) 60 - 170 ug/dL Final     Iron Saturation   Date Value Ref Range Status   04/20/2021 31 20 - 55 % Final   05/22/2020 5 (L) 20 - 55 % Final     Transferrin   Date Value Ref Range Status   04/20/2021 240.00 (L) 250.00 - 380.00 mg/dL Final   05/22/2020 351.00 250.00 - 380.00 mg/dL Final     TIBC   Date Value Ref Range Status   04/20/2021 343 245 - 450 ug/dL Final     Comment:     As of 10/15/03, the chemistry department began utilizing a Transferrin  assay. Data suggests that Transferrin is a better estimate of Total Iron  binding capacity than the TIBC assay. As a result the TIBC will now be a  calculated estimate from the measured Transferrin.     05/22/2020 502 (H) 245 - 450 ug/dL Final     Comment:     As of 10/15/03, the chemistry department began utilizing a Transferrin  assay. Data suggests that Transferrin is a better estimate of Total Iron  binding capacity than the TIBC assay. As a result the TIBC will now be a  calculated estimate from the measured Transferrin.       Ferritin   Date Value Ref Range Status   04/20/2021 391 (H) 10 - 200 ng/mL Final     Comment:     <10 ng/ml usually associated with Iron Deficiency Anemia.  Above normal range levels may be due to Hepatic and/or  Chronic Inflammatory Disease.      05/22/2020 23 10 - 200 ng/mL Final     Comment:     <10 ng/ml usually associated with Iron Deficiency Anemia.  Above normal range levels may be due to Hepatic and/or  Chronic Inflammatory Disease.                   PHQ-9 Total Score: 0         Result Review :   The following data was reviewed by: Macie Ayoub on 07/01/2021:  Lab Results   Component Value Date    HGB 10.4 (L) 06/01/2021    HCT 28.8 (L) 06/01/2021    MCV 84.5 06/01/2021     06/01/2021    WBC 8.18 06/01/2021    NEUTROABS 4.96 06/01/2021    LYMPHSABS 2.23 06/01/2021    MONOSABS 0.70 06/01/2021    EOSABS 0.22 06/01/2021    BASOSABS 0.03 06/01/2021     Lab Results   Component Value Date    BUN 12 06/01/2021    CREATININE 0.83 06/01/2021     (L) 06/01/2021    K 4.2 06/01/2021    CL 95 (L) 06/01/2021    CO2 23 06/01/2021    CALCIUM 8.6 (L) 06/01/2021    PROTEINTOT 7.3 05/30/2021    ALBUMIN 4.3 05/30/2021    BILITOT 0.77 05/30/2021    ALKPHOS 125 05/30/2021    AST 30 05/30/2021    ALT 25 05/30/2021         Data reviewed: Radiologic studies labs          Assessment and Plan      ASSESSMENT  Hx of pancreatic cancer  Clinically stable    Labs and ca19-9 and f/u scans ordered for 8/21   OV post scans  PLAN/RECOMMENDATIONS      There are no diagnoses linked to this encounter.    Patient was given instructions and counseling regarding her condition or for health maintenance advice. Please see specific information pulled into the AVS if appropriate.     Macie Ayoub    7/1/2021

## 2021-08-31 ENCOUNTER — HOSPITAL ENCOUNTER (OUTPATIENT)
Dept: CT IMAGING | Facility: HOSPITAL | Age: 71
Discharge: HOME | End: 2021-08-31
Admitting: INTERNAL MEDICINE

## 2021-08-31 DIAGNOSIS — C25.9 MALIGNANT NEOPLASM OF PANCREAS, UNSPECIFIED LOCATION OF MALIGNANCY (HCC): ICD-10-CM

## 2021-08-31 LAB — CREAT BLDA-MCNC: 0.7 MG/DL

## 2021-08-31 PROCEDURE — 71260 CT THORAX DX C+: CPT

## 2021-08-31 PROCEDURE — 74177 CT ABD & PELVIS W/CONTRAST: CPT

## 2021-08-31 PROCEDURE — 82565 ASSAY OF CREATININE: CPT

## 2021-08-31 PROCEDURE — 0 IOPAMIDOL PER 1 ML: Performed by: INTERNAL MEDICINE

## 2021-08-31 RX ORDER — HEPARIN SODIUM (PORCINE) LOCK FLUSH IV SOLN 100 UNIT/ML 100 UNIT/ML
SOLUTION INTRAVENOUS
Status: DISPENSED
Start: 2021-08-31 | End: 2021-08-31

## 2021-08-31 RX ADMIN — IOPAMIDOL 100 ML: 755 INJECTION, SOLUTION INTRAVENOUS at 10:51

## 2021-09-01 ENCOUNTER — APPOINTMENT (OUTPATIENT)
Dept: ONCOLOGY | Facility: HOSPITAL | Age: 71
End: 2021-09-01

## 2021-09-07 ENCOUNTER — TELEPHONE (OUTPATIENT)
Dept: ONCOLOGY | Facility: HOSPITAL | Age: 71
End: 2021-09-07

## 2021-09-07 NOTE — TELEPHONE ENCOUNTER
Caller: Yvrose Duncan    Relationship: Self    Best call back number: [phone]    What is the best time to reach you:   ANYTIME      Who are you requesting to speak with (clinical staff, provider,  specific staff member):   DR MUNIZ OR NURSE    Do you know the name of the person who called: YVROSE     What was the call regarding:  HAD CT 08/31 AND WAS CALLED LAST Friday SAYING THE RESULTS WERE IN WOULD LIKE TO KNOW THE RESULTS OF CT SCAN.     Do you require a callback: YES

## 2021-09-09 ENCOUNTER — TELEPHONE (OUTPATIENT)
Dept: ONCOLOGY | Facility: HOSPITAL | Age: 71
End: 2021-09-09

## 2021-09-09 NOTE — TELEPHONE ENCOUNTER
Patient to have labs done at [facility]. Address given. Explained that CT results will be discussed at her follow up appointment.

## 2021-09-09 NOTE — TELEPHONE ENCOUNTER
Caller: Yvrose Duncan    Relationship: Self    Best call back number: [phone]    What is the best time to reach you: AFTERNOON/EVENING    Who are you requesting to speak with (clinical staff, provider,  specific staff member): CLINICAL    Do you know the name of the person who called:     What was the call regarding: PT REQUESTING LABS 9/21/21 BE SENT TO [facility] BUT NEEDS AN ADDRESS.    PT ALSO REQUESTING CT SCAN RESULTS    Do you require a callback: YES

## 2021-09-10 ENCOUNTER — LAB (OUTPATIENT)
Dept: LAB | Facility: HOSPITAL | Age: 71
End: 2021-09-10

## 2021-09-10 DIAGNOSIS — C25.9 MALIGNANT NEOPLASM OF PANCREAS, UNSPECIFIED LOCATION OF MALIGNANCY (HCC): ICD-10-CM

## 2021-09-10 LAB
ALBUMIN SERPL-MCNC: 4.4 G/DL (ref 3.5–5.2)
ALBUMIN/GLOB SERPL: 1.6 G/DL
ALP SERPL-CCNC: 132 U/L (ref 39–117)
ALT SERPL W P-5'-P-CCNC: 22 U/L (ref 1–33)
ANION GAP SERPL CALCULATED.3IONS-SCNC: 13.5 MMOL/L (ref 5–15)
AST SERPL-CCNC: 26 U/L (ref 1–32)
BASOPHILS # BLD AUTO: 0.05 10*3/MM3 (ref 0–0.2)
BASOPHILS NFR BLD AUTO: 0.6 % (ref 0–1.5)
BILIRUB SERPL-MCNC: 0.4 MG/DL (ref 0–1.2)
BUN SERPL-MCNC: 18 MG/DL (ref 8–23)
BUN/CREAT SERPL: 20.2 (ref 7–25)
CALCIUM SPEC-SCNC: 9.3 MG/DL (ref 8.6–10.5)
CANCER AG19-9 SERPL-ACNC: 31.6 U/ML
CHLORIDE SERPL-SCNC: 101 MMOL/L (ref 98–107)
CO2 SERPL-SCNC: 22.5 MMOL/L (ref 22–29)
CREAT SERPL-MCNC: 0.89 MG/DL (ref 0.57–1)
DEPRECATED RDW RBC AUTO: 41.5 FL (ref 37–54)
EOSINOPHIL # BLD AUTO: 0.16 10*3/MM3 (ref 0–0.4)
EOSINOPHIL NFR BLD AUTO: 1.9 % (ref 0.3–6.2)
ERYTHROCYTE [DISTWIDTH] IN BLOOD BY AUTOMATED COUNT: 12.6 % (ref 12.3–15.4)
GFR SERPL CREATININE-BSD FRML MDRD: 62 ML/MIN/1.73
GLOBULIN UR ELPH-MCNC: 2.8 GM/DL
GLUCOSE SERPL-MCNC: 325 MG/DL (ref 65–99)
HCT VFR BLD AUTO: 35.7 % (ref 34–46.6)
HGB BLD-MCNC: 12.2 G/DL (ref 12–15.9)
IMM GRANULOCYTES # BLD AUTO: 0.03 10*3/MM3 (ref 0–0.05)
IMM GRANULOCYTES NFR BLD AUTO: 0.4 % (ref 0–0.5)
LYMPHOCYTES # BLD AUTO: 1.84 10*3/MM3 (ref 0.7–3.1)
LYMPHOCYTES NFR BLD AUTO: 21.5 % (ref 19.6–45.3)
MCH RBC QN AUTO: 30.7 PG (ref 26.6–33)
MCHC RBC AUTO-ENTMCNC: 34.2 G/DL (ref 31.5–35.7)
MCV RBC AUTO: 89.9 FL (ref 79–97)
MONOCYTES # BLD AUTO: 0.46 10*3/MM3 (ref 0.1–0.9)
MONOCYTES NFR BLD AUTO: 5.4 % (ref 5–12)
NEUTROPHILS NFR BLD AUTO: 6.02 10*3/MM3 (ref 1.7–7)
NEUTROPHILS NFR BLD AUTO: 70.2 % (ref 42.7–76)
NRBC BLD AUTO-RTO: 0 /100 WBC (ref 0–0.2)
PLATELET # BLD AUTO: 181 10*3/MM3 (ref 140–450)
PMV BLD AUTO: 10.5 FL (ref 6–12)
POTASSIUM SERPL-SCNC: 4.1 MMOL/L (ref 3.5–5.2)
PROT SERPL-MCNC: 7.2 G/DL (ref 6–8.5)
RBC # BLD AUTO: 3.97 10*6/MM3 (ref 3.77–5.28)
SODIUM SERPL-SCNC: 137 MMOL/L (ref 136–145)
WBC # BLD AUTO: 8.56 10*3/MM3 (ref 3.4–10.8)

## 2021-09-10 PROCEDURE — 36415 COLL VENOUS BLD VENIPUNCTURE: CPT

## 2021-09-10 PROCEDURE — 86301 IMMUNOASSAY TUMOR CA 19-9: CPT

## 2021-09-10 PROCEDURE — 80053 COMPREHEN METABOLIC PANEL: CPT

## 2021-09-10 PROCEDURE — 85025 COMPLETE CBC W/AUTO DIFF WBC: CPT

## 2021-09-17 ENCOUNTER — APPOINTMENT (OUTPATIENT)
Dept: ONCOLOGY | Facility: HOSPITAL | Age: 71
End: 2021-09-17

## 2021-09-21 ENCOUNTER — OFFICE VISIT (OUTPATIENT)
Dept: ONCOLOGY | Facility: HOSPITAL | Age: 71
End: 2021-09-21

## 2021-09-21 VITALS
BODY MASS INDEX: 27.62 KG/M2 | OXYGEN SATURATION: 96 % | WEIGHT: 160.94 LBS | RESPIRATION RATE: 16 BRPM | DIASTOLIC BLOOD PRESSURE: 76 MMHG | HEART RATE: 86 BPM | SYSTOLIC BLOOD PRESSURE: 154 MMHG | TEMPERATURE: 98 F

## 2021-09-21 DIAGNOSIS — C25.0 MALIGNANT NEOPLASM OF HEAD OF PANCREAS (HCC): ICD-10-CM

## 2021-09-21 PROCEDURE — 99213 OFFICE O/P EST LOW 20 MIN: CPT | Performed by: INTERNAL MEDICINE

## 2021-09-21 PROCEDURE — G0463 HOSPITAL OUTPT CLINIC VISIT: HCPCS

## 2021-09-21 NOTE — PROGRESS NOTES
Yvrose Duncan   : 1950     LOCATION: [facility] HEMATOLOGY & ONCOLOGY     Chief Complaint  Pancreatic Cancer (-fu)    Referring Provider: Brock Quach, *  PCP: Brock Quach PA    Oncology/Hematology History Overview Note   Ms. Duncan is a 70-year-old female with pancreatic cancer.     Presented with abdomnial pain and jaundice. 2020 patient underwent EUS at [facility] by Dr. Acevedo, pancreatic head mass, 2.8 x 2.7 cm in size as well as a common bile duct stricture.  She had a stent placed in the common bile duct. Biopsy was negative. The procedure was complicated by pancreatitis, mesenteric vein thrombosis and then E coli sepsis.       Then on 3/5/2020 she had worsening abdominal pain and was readmitted to [facility] with ascending cholangitis secondary to a blocked stent.  On 3/6/2020 the patient underwent ERCP with stent exchange by Dr. Clancy. CT abdomen/pelvis showed cystic areas within the head of the pancreas that may relate to pancreatitis or possibly branch type IPMN.     Referred to Dr. Casillas at the [facility].  She was admitted for feeding tube placement and nutritional optimization and pain control.  Whipple procedure on 4/15/2020.  Pathology revealed invasive moderately to poorly differentiated adenocarcinoma in the pancreas as well as an 5 of 21 lymph nodes.  She also had perineural invasion present.  Pathologic stage was pT2pN2.      Cycle 1 of adjuvant chemotherapy with gemcitabine and Abraxane on 2020.  Cycle 1 day 8 on 2020.  Cycle 2-day 1 on 6/15/2020, started doing treatment on days 1 and 15 of a q. 28-day cycle, C2D15 on 20  Cycle 3-day 1 on 2020  Cycle 4-day 1 on 8/10/2020    Iron deficiency anemia: Patient was treated with Injectafer on  and 15.     on 3/7/20 was elevated at 273 and improved to 27.3 on 20.    CT scan on 20: No evidence of metastatic disease in the chest, abdomen, or pelvis.   Postoperative changes from Whipple procedure are noted.  CT scan on 3/12/2021: Previous Whipple procedure. No definite evidence for active malignancy in the chest, abdomen, or pelvis.     Malignant neoplasm of head of pancreas (CMS/HCC)   1/21/2020 Initial Diagnosis    Malignant neoplasm of head of pancreas (CMS/HCC)     4/15/2020 Surgery    Surgery       Procedure:  Whipple by Dr. Casillas at [facility]: Pathology revealed invasive moderately to poorly differentiated adenocarcinoma in the pancreas as well as an 5 of 21 lymph nodes.  She also had perineural invasion present.  Pathologic stage was pT2pN2.         5/22/2020 - 8/10/2020 Chemotherapy    Gemcitabine and Abraxane x 4 cycles           Subjective        History of Present Illness   Pt here for f/u hx of pancreatic cancer  She reports she is doing well   Denies any weight loss any nausea or abdominal pain    Review of Systems  Current Outpatient Medications on File Prior to Visit   Medication Sig Dispense Refill   • atorvastatin (LIPITOR) 20 MG tablet Take 20 mg by mouth Every Night.     • busPIRone (BUSPAR) 10 MG tablet Take 10 mg by mouth 2 (Two) Times a Day.     • carvedilol (COREG) 6.25 MG tablet carvedilol 6.25 mg oral tablet take 1 tablet (6.25 mg) by oral route 2 times per day with food   Suspended     • Creon 6000-47408 units capsule delayed-release particles capsule Take 18,000 units of lipase by mouth 3 (Three) Times a Day.     • Dulaglutide (Trulicity) 0.75 MG/0.5ML solution pen-injector      • fluticasone (FLONASE) 50 MCG/ACT nasal spray      • Levemir 100 UNIT/ML injection INJECT 10 UNITS SUBCUTANEOUSLY ONCE DAILY     • loratadine (CLARITIN) 10 MG tablet Take 10 mg by mouth Daily.     • Mag64 64 MG DR tablet Take  by mouth Daily.     • Magnesium 65 MG tablet Take  by mouth.     • metFORMIN (GLUCOPHAGE) 1000 MG tablet      • omeprazole (priLOSEC) 40 MG capsule      • prazosin (MINIPRESS) 2 MG capsule Take 2 mg by mouth Every Night.      • sertraline (ZOLOFT) 100 MG tablet TAKE 1 2 (ONE HALF) TABLET BY MOUTH ONCE DAILY     • sodium chloride 1 g tablet Take 1 g by mouth 3 (Three) Times a Day.     • traZODone (DESYREL) 100 MG tablet      • Turmeric 400 MG capsule turmeric 400 mg oral capsule take 1 capsule by oral route daily   Active       No current facility-administered medications on file prior to visit.       Allergies   Allergen Reactions   • Adhesive Tape Hives and Itching     Blisters   • Latex Other (See Comments)     Blistering of skin       Past Medical History:   Diagnosis Date   • Diabetes (CMS/HCC)    • Essential hypertension    • Gallstones    • Heartburn    • History of pancreatic cancer    • No family history of colorectal cancer    • Pancreatic cancer (CMS/HCC)    • Sleep apnea      Past Surgical History:   Procedure Laterality Date   • APPENDECTOMY     • COLONOSCOPY     • ERCP     • FOOT SURGERY     • GALLBLADDER SURGERY     • HERNIA REPAIR     • OOPHORECTOMY     • UPPER GASTROINTESTINAL ENDOSCOPY     • WHIPPLE PROCEDURE       Social History     Socioeconomic History   • Marital status:      Spouse name: Not on file   • Number of children: Not on file   • Years of education: Not on file   • Highest education level: Not on file   Tobacco Use   • Smoking status: Never Smoker   Vaping Use   • Vaping Use: Never used   Substance and Sexual Activity   • Alcohol use: Never   • Sexual activity: Defer     Family History   Problem Relation Age of Onset   • Heart attack Mother    • Diabetes Mother    • Heart disease Mother    • Heart attack Father    • Colon cancer Neg Hx        There is no immunization history on file for this patient.    Objective     Vitals:    09/21/21 1411   BP: 154/76   Pulse: 86   Resp: 16   Temp: 98 °F (36.7 °C)   SpO2: 96%   Weight: 73 kg (160 lb 15 oz)   PainSc: 0-No pain     Body mass index is 27.62 kg/m².     Physical Exam    Deferred for discussion      Iron   Date Value Ref Range Status   04/20/2021 108  60 - 170 ug/dL Final   05/22/2020 27 (L) 60 - 170 ug/dL Final     Iron Saturation   Date Value Ref Range Status   04/20/2021 31 20 - 55 % Final   05/22/2020 5 (L) 20 - 55 % Final     Transferrin   Date Value Ref Range Status   04/20/2021 240.00 (L) 250.00 - 380.00 mg/dL Final   05/22/2020 351.00 250.00 - 380.00 mg/dL Final     TIBC   Date Value Ref Range Status   04/20/2021 343 245 - 450 ug/dL Final     Comment:     As of 10/15/03, the chemistry department began utilizing a Transferrin  assay. Data suggests that Transferrin is a better estimate of Total Iron  binding capacity than the TIBC assay. As a result the TIBC will now be a  calculated estimate from the measured Transferrin.     05/22/2020 502 (H) 245 - 450 ug/dL Final     Comment:     As of 10/15/03, the chemistry department began utilizing a Transferrin  assay. Data suggests that Transferrin is a better estimate of Total Iron  binding capacity than the TIBC assay. As a result the TIBC will now be a  calculated estimate from the measured Transferrin.       Ferritin   Date Value Ref Range Status   04/20/2021 391 (H) 10 - 200 ng/mL Final     Comment:     <10 ng/ml usually associated with Iron Deficiency Anemia.  Above normal range levels may be due to Hepatic and/or  Chronic Inflammatory Disease.     05/22/2020 23 10 - 200 ng/mL Final     Comment:     <10 ng/ml usually associated with Iron Deficiency Anemia.  Above normal range levels may be due to Hepatic and/or  Chronic Inflammatory Disease.       ECOG score: 0           PHQ-9 Total Score:           Result Review :   The following data was reviewed by: Michelle Eller MD on 09/21/2021:  Lab Results   Component Value Date    HGB 12.2 09/10/2021    HCT 35.7 09/10/2021    MCV 89.9 09/10/2021     09/10/2021    WBC 8.56 09/10/2021    NEUTROABS 6.02 09/10/2021    LYMPHSABS 1.84 09/10/2021    MONOSABS 0.46 09/10/2021    EOSABS 0.16 09/10/2021    BASOSABS 0.05 09/10/2021     Lab Results   Component Value Date     GLUCOSE 325 (H) 09/10/2021    BUN 18 09/10/2021    CREATININE 0.89 09/10/2021     09/10/2021    K 4.1 09/10/2021     09/10/2021    CO2 22.5 09/10/2021    CALCIUM 9.3 09/10/2021    PROTEINTOT 7.2 09/10/2021    ALBUMIN 4.40 09/10/2021    BILITOT 0.4 09/10/2021    ALKPHOS 132 (H) 09/10/2021    AST 26 09/10/2021    ALT 22 09/10/2021                 Data reviewed: Radiologic studies ct scan, labs          Assessment and Plan      ASSESSMENT  Hx of pancreatic cancer  PLAN/RECOMMENDATIONS  There is no evidence of recurrence by imaging, labs or exam.      - I reviewed her CBC, CMP, and CA-19-9. which are all normal.        - Plan to continue surveillance: repeat CT CAP with contrast.      - RTC in 6months for labs, exam and imaging.                Diagnoses and all orders for this visit:    1. Malignant neoplasm of head of pancreas (CMS/HCC)  -     CT Abdomen Pelvis With & Without Contrast; Future  -     Cancer Antigen 19-9; Future  -     Comprehensive Metabolic Panel; Future  -     CBC & Differential; Future      I spent 20 minutes caring for Yvrose on this date of service. This time includes time spent by me in the following activities: reviewing tests, counseling and educating the patient/family/caregiver, ordering medications, tests, or procedures, documenting information in the medical record and independently interpreting results and communicating that information with the patient/family/caregiver    Patient was given instructions and counseling regarding her condition or for health maintenance advice. Please see specific information pulled into the AVS if appropriate.     Michelle Eller MD    9/21/2021

## 2021-11-08 ENCOUNTER — TRANSCRIBE ORDERS (OUTPATIENT)
Dept: ADMINISTRATIVE | Facility: HOSPITAL | Age: 71
End: 2021-11-08

## 2021-11-08 DIAGNOSIS — Z12.31 SCREENING MAMMOGRAM, ENCOUNTER FOR: Primary | ICD-10-CM

## 2021-11-16 ENCOUNTER — TELEPHONE (OUTPATIENT)
Dept: ONCOLOGY | Facility: HOSPITAL | Age: 71
End: 2021-11-16

## 2021-11-16 NOTE — TELEPHONE ENCOUNTER
Caller: Yvrose Duncan    Relationship to patient: Self    Best call back number: [phone]    Chief complaint: PT TO SCHED PORT FLUSH    Type of visit: PORT FLUSH    Requested date: NEXT AVAILABLE    If rescheduling, when is the original appointment:     Additional notes:

## 2021-11-17 PROBLEM — Z45.2 ENCOUNTER FOR ADJUSTMENT OR MANAGEMENT OF VASCULAR ACCESS DEVICE: Status: ACTIVE | Noted: 2021-11-17

## 2021-11-17 RX ORDER — HEPARIN SODIUM (PORCINE) LOCK FLUSH IV SOLN 100 UNIT/ML 100 UNIT/ML
500 SOLUTION INTRAVENOUS AS NEEDED
Status: CANCELLED | OUTPATIENT
Start: 2021-11-18

## 2021-11-17 RX ORDER — SODIUM CHLORIDE 0.9 % (FLUSH) 0.9 %
20 SYRINGE (ML) INJECTION AS NEEDED
Status: CANCELLED | OUTPATIENT
Start: 2021-11-18

## 2021-11-18 ENCOUNTER — HOSPITAL ENCOUNTER (OUTPATIENT)
Dept: ONCOLOGY | Facility: HOSPITAL | Age: 71
Setting detail: INFUSION SERIES
Discharge: HOME | End: 2021-11-18

## 2021-11-18 DIAGNOSIS — Z45.2 ENCOUNTER FOR ADJUSTMENT OR MANAGEMENT OF VASCULAR ACCESS DEVICE: Primary | ICD-10-CM

## 2021-11-18 PROCEDURE — 25010000002 HEPARIN LOCK FLUSH PER 10 UNITS: Performed by: INTERNAL MEDICINE

## 2021-11-18 PROCEDURE — 96523 IRRIG DRUG DELIVERY DEVICE: CPT

## 2021-11-18 RX ORDER — SODIUM CHLORIDE 0.9 % (FLUSH) 0.9 %
20 SYRINGE (ML) INJECTION AS NEEDED
Status: DISCONTINUED | OUTPATIENT
Start: 2021-11-18 | End: 2021-11-19 | Stop reason: HOSPADM

## 2021-11-18 RX ORDER — HEPARIN SODIUM (PORCINE) LOCK FLUSH IV SOLN 100 UNIT/ML 100 UNIT/ML
500 SOLUTION INTRAVENOUS AS NEEDED
Status: CANCELLED | OUTPATIENT
Start: 2022-01-13

## 2021-11-18 RX ORDER — SODIUM CHLORIDE 0.9 % (FLUSH) 0.9 %
20 SYRINGE (ML) INJECTION AS NEEDED
Status: CANCELLED | OUTPATIENT
Start: 2022-01-13

## 2021-11-18 RX ORDER — HEPARIN SODIUM (PORCINE) LOCK FLUSH IV SOLN 100 UNIT/ML 100 UNIT/ML
500 SOLUTION INTRAVENOUS AS NEEDED
Status: DISCONTINUED | OUTPATIENT
Start: 2021-11-18 | End: 2021-11-19 | Stop reason: HOSPADM

## 2021-11-18 RX ADMIN — HEPARIN SODIUM (PORCINE) LOCK FLUSH IV SOLN 100 UNIT/ML 500 UNITS: 100 SOLUTION at 11:51

## 2021-11-18 RX ADMIN — SODIUM CHLORIDE, PRESERVATIVE FREE 20 ML: 5 INJECTION INTRAVENOUS at 11:51

## 2021-12-23 ENCOUNTER — TRANSCRIBE ORDERS (OUTPATIENT)
Dept: ADMINISTRATIVE | Facility: HOSPITAL | Age: 71
End: 2021-12-23

## 2022-01-13 ENCOUNTER — TELEPHONE (OUTPATIENT)
Dept: ONCOLOGY | Facility: HOSPITAL | Age: 72
End: 2022-01-13

## 2022-01-13 ENCOUNTER — HOSPITAL ENCOUNTER (OUTPATIENT)
Dept: ONCOLOGY | Facility: HOSPITAL | Age: 72
Setting detail: INFUSION SERIES
Discharge: HOME | End: 2022-01-13

## 2022-01-13 DIAGNOSIS — Z45.2 ENCOUNTER FOR ADJUSTMENT OR MANAGEMENT OF VASCULAR ACCESS DEVICE: Primary | ICD-10-CM

## 2022-01-13 PROCEDURE — 25010000002 HEPARIN LOCK FLUSH PER 10 UNITS: Performed by: INTERNAL MEDICINE

## 2022-01-13 PROCEDURE — 96523 IRRIG DRUG DELIVERY DEVICE: CPT

## 2022-01-13 RX ORDER — HEPARIN SODIUM (PORCINE) LOCK FLUSH IV SOLN 100 UNIT/ML 100 UNIT/ML
500 SOLUTION INTRAVENOUS AS NEEDED
Status: CANCELLED | OUTPATIENT
Start: 2022-01-13

## 2022-01-13 RX ORDER — SODIUM CHLORIDE 0.9 % (FLUSH) 0.9 %
20 SYRINGE (ML) INJECTION AS NEEDED
Status: DISCONTINUED | OUTPATIENT
Start: 2022-01-13 | End: 2022-01-14 | Stop reason: HOSPADM

## 2022-01-13 RX ORDER — HEPARIN SODIUM (PORCINE) LOCK FLUSH IV SOLN 100 UNIT/ML 100 UNIT/ML
500 SOLUTION INTRAVENOUS AS NEEDED
Status: DISCONTINUED | OUTPATIENT
Start: 2022-01-13 | End: 2022-01-14 | Stop reason: HOSPADM

## 2022-01-13 RX ORDER — SODIUM CHLORIDE 0.9 % (FLUSH) 0.9 %
20 SYRINGE (ML) INJECTION AS NEEDED
Status: CANCELLED | OUTPATIENT
Start: 2022-01-13

## 2022-01-13 RX ADMIN — SODIUM CHLORIDE, PRESERVATIVE FREE 20 ML: 5 INJECTION INTRAVENOUS at 11:38

## 2022-01-13 RX ADMIN — HEPARIN SODIUM (PORCINE) LOCK FLUSH IV SOLN 100 UNIT/ML 500 UNITS: 100 SOLUTION at 11:38

## 2022-01-13 NOTE — TELEPHONE ENCOUNTER
jaime     Caller: BETTY ([facility])    Relationship: [employer]      What was the call regarding:     CALLING ON BEHALF OF MEMBER, PATIENT HAD STATED HAD SOME DIFFICULTY VERIFYING COVERAGE AT APPT TODAY CALLING TO CLARIFY AND MAKE SURE NO ISSUES WITH COVERAGE.    I ADVISED IN SYSTEM PT IS VERIFIED BUT I WANTED TO DOUBLE CHECK WITH  MAKE SURE THERE WASN'T ANYTHING ELSE GOING ON WITH THAT.       I CALLED  AND SPOKE TO JACOB SHE ADVISED HAD GOT IT FIGURED OUT BUT SHE HAD A QUESTION AND WANTED TO TALK WITH Cardio control.    I WARM TRANSFERRED BETTY TO JACOB ON THE NON-CLINICAL LINE TO FURTHER ASSIST.

## 2022-01-28 ENCOUNTER — TELEPHONE (OUTPATIENT)
Dept: GASTROENTEROLOGY | Facility: CLINIC | Age: 72
End: 2022-01-28

## 2022-01-28 ENCOUNTER — APPOINTMENT (OUTPATIENT)
Dept: MAMMOGRAPHY | Facility: HOSPITAL | Age: 72
End: 2022-01-28

## 2022-02-12 ENCOUNTER — HOSPITAL ENCOUNTER (OUTPATIENT)
Dept: MAMMOGRAPHY | Facility: HOSPITAL | Age: 72
Discharge: HOME | End: 2022-02-12
Admitting: PHYSICIAN ASSISTANT

## 2022-02-12 DIAGNOSIS — Z12.31 SCREENING MAMMOGRAM, ENCOUNTER FOR: ICD-10-CM

## 2022-02-12 PROCEDURE — 77063 BREAST TOMOSYNTHESIS BI: CPT

## 2022-02-12 PROCEDURE — 77067 SCR MAMMO BI INCL CAD: CPT

## 2022-02-22 ENCOUNTER — TELEPHONE (OUTPATIENT)
Dept: ONCOLOGY | Facility: HOSPITAL | Age: 72
End: 2022-02-22

## 2022-02-22 NOTE — TELEPHONE ENCOUNTER
Caller: SAMAN  Relationship to Patient: SELF    Reason for Call: SAMAN IS WANTING TO CANCEL HER APPT ON 2-24.  SHE STATED SHE WILL CALL BACK TO R/S

## 2022-02-24 ENCOUNTER — APPOINTMENT (OUTPATIENT)
Dept: ONCOLOGY | Facility: HOSPITAL | Age: 72
End: 2022-02-24

## 2022-03-07 ENCOUNTER — TELEPHONE (OUTPATIENT)
Dept: ONCOLOGY | Facility: HOSPITAL | Age: 72
End: 2022-03-07

## 2022-03-07 NOTE — TELEPHONE ENCOUNTER
Caller: Yvrose Duncan    Relationship: Self    Best call back number: [phone]    What is the best time to reach you: ANYTIME    Who are you requesting to speak with (clinical staff, provider,  specific staff member): DR MUNIZ OR NURSE    What was the call regarding: PT STATED THAT HER CT SCAN IS USUALLY SCHEDULED FOR A FEW DAYS BEFORE HER F/U WITH DR MUNIZ. SHE WANTED TO MAKE SURE IT WAS OK TO HAVE ABOUT 3 WEEKS BETWEEN HER APPTS - SHE IS SCHED FOR 3/17 CT AND 4/16 F/U. PLEASE CALL PT TO ADVISE.     Do you require a callback: YES

## 2022-03-17 ENCOUNTER — APPOINTMENT (OUTPATIENT)
Dept: CT IMAGING | Facility: HOSPITAL | Age: 72
End: 2022-03-17

## 2022-04-05 ENCOUNTER — LAB (OUTPATIENT)
Dept: LAB | Facility: HOSPITAL | Age: 72
End: 2022-04-05

## 2022-04-05 ENCOUNTER — TELEPHONE (OUTPATIENT)
Dept: ONCOLOGY | Facility: HOSPITAL | Age: 72
End: 2022-04-05

## 2022-04-05 DIAGNOSIS — C25.0 MALIGNANT NEOPLASM OF HEAD OF PANCREAS: ICD-10-CM

## 2022-04-05 LAB
ALBUMIN SERPL-MCNC: 4.6 G/DL (ref 3.5–5.2)
ALBUMIN/GLOB SERPL: 1.6 G/DL
ALP SERPL-CCNC: 131 U/L (ref 39–117)
ALT SERPL W P-5'-P-CCNC: 30 U/L (ref 1–33)
ANION GAP SERPL CALCULATED.3IONS-SCNC: 13.9 MMOL/L (ref 5–15)
AST SERPL-CCNC: 24 U/L (ref 1–32)
BASOPHILS # BLD AUTO: 0.05 10*3/MM3 (ref 0–0.2)
BASOPHILS NFR BLD AUTO: 0.7 % (ref 0–1.5)
BILIRUB SERPL-MCNC: 0.5 MG/DL (ref 0–1.2)
BUN SERPL-MCNC: 17 MG/DL (ref 8–23)
BUN/CREAT SERPL: 17.9 (ref 7–25)
CALCIUM SPEC-SCNC: 9.4 MG/DL (ref 8.6–10.5)
CANCER AG19-9 SERPL-ACNC: 140.2 U/ML
CHLORIDE SERPL-SCNC: 97 MMOL/L (ref 98–107)
CO2 SERPL-SCNC: 23.1 MMOL/L (ref 22–29)
CREAT SERPL-MCNC: 0.95 MG/DL (ref 0.57–1)
DEPRECATED RDW RBC AUTO: 43.7 FL (ref 37–54)
EGFRCR SERPLBLD CKD-EPI 2021: 63.8 ML/MIN/1.73
EOSINOPHIL # BLD AUTO: 0.15 10*3/MM3 (ref 0–0.4)
EOSINOPHIL NFR BLD AUTO: 2.1 % (ref 0.3–6.2)
ERYTHROCYTE [DISTWIDTH] IN BLOOD BY AUTOMATED COUNT: 13.5 % (ref 12.3–15.4)
GLOBULIN UR ELPH-MCNC: 2.9 GM/DL
GLUCOSE SERPL-MCNC: 413 MG/DL (ref 65–99)
HCT VFR BLD AUTO: 37.5 % (ref 34–46.6)
HGB BLD-MCNC: 12.8 G/DL (ref 12–15.9)
IMM GRANULOCYTES # BLD AUTO: 0.02 10*3/MM3 (ref 0–0.05)
IMM GRANULOCYTES NFR BLD AUTO: 0.3 % (ref 0–0.5)
LYMPHOCYTES # BLD AUTO: 1.96 10*3/MM3 (ref 0.7–3.1)
LYMPHOCYTES NFR BLD AUTO: 27.4 % (ref 19.6–45.3)
MCH RBC QN AUTO: 30.2 PG (ref 26.6–33)
MCHC RBC AUTO-ENTMCNC: 34.1 G/DL (ref 31.5–35.7)
MCV RBC AUTO: 88.4 FL (ref 79–97)
MONOCYTES # BLD AUTO: 0.39 10*3/MM3 (ref 0.1–0.9)
MONOCYTES NFR BLD AUTO: 5.4 % (ref 5–12)
NEUTROPHILS NFR BLD AUTO: 4.59 10*3/MM3 (ref 1.7–7)
NEUTROPHILS NFR BLD AUTO: 64.1 % (ref 42.7–76)
NRBC BLD AUTO-RTO: 0 /100 WBC (ref 0–0.2)
PLATELET # BLD AUTO: 174 10*3/MM3 (ref 140–450)
PMV BLD AUTO: 10.8 FL (ref 6–12)
POTASSIUM SERPL-SCNC: 3.8 MMOL/L (ref 3.5–5.2)
PROT SERPL-MCNC: 7.5 G/DL (ref 6–8.5)
RBC # BLD AUTO: 4.24 10*6/MM3 (ref 3.77–5.28)
SODIUM SERPL-SCNC: 134 MMOL/L (ref 136–145)
WBC NRBC COR # BLD: 7.16 10*3/MM3 (ref 3.4–10.8)

## 2022-04-05 PROCEDURE — 85025 COMPLETE CBC W/AUTO DIFF WBC: CPT

## 2022-04-05 PROCEDURE — 36415 COLL VENOUS BLD VENIPUNCTURE: CPT

## 2022-04-05 PROCEDURE — 86301 IMMUNOASSAY TUMOR CA 19-9: CPT

## 2022-04-05 PROCEDURE — 80053 COMPREHEN METABOLIC PANEL: CPT

## 2022-04-05 NOTE — TELEPHONE ENCOUNTER
Notified patient glucose 413 on this morning's labs. Patient reports she had not taken her insulin this morning before labs. She reports she monitors her sugar at home and will follow up with her PCP.

## 2022-04-06 ENCOUNTER — APPOINTMENT (OUTPATIENT)
Dept: ONCOLOGY | Facility: HOSPITAL | Age: 72
End: 2022-04-06

## 2022-04-06 DIAGNOSIS — C25.0 MALIGNANT NEOPLASM OF HEAD OF PANCREAS: Primary | ICD-10-CM

## 2022-04-07 ENCOUNTER — HOSPITAL ENCOUNTER (OUTPATIENT)
Dept: CT IMAGING | Facility: HOSPITAL | Age: 72
Discharge: HOME | End: 2022-04-07
Admitting: INTERNAL MEDICINE

## 2022-04-07 DIAGNOSIS — C25.0 MALIGNANT NEOPLASM OF HEAD OF PANCREAS: ICD-10-CM

## 2022-04-07 PROCEDURE — 74178 CT ABD&PLV WO CNTR FLWD CNTR: CPT

## 2022-04-07 PROCEDURE — 0 IOPAMIDOL PER 1 ML: Performed by: INTERNAL MEDICINE

## 2022-04-07 RX ADMIN — IOPAMIDOL 50 ML: 755 INJECTION, SOLUTION INTRAVENOUS at 11:40

## 2022-04-08 ENCOUNTER — APPOINTMENT (OUTPATIENT)
Dept: ONCOLOGY | Facility: HOSPITAL | Age: 72
End: 2022-04-08

## 2022-04-08 ENCOUNTER — HOSPITAL ENCOUNTER (OUTPATIENT)
Dept: ONCOLOGY | Facility: HOSPITAL | Age: 72
Setting detail: INFUSION SERIES
Discharge: HOME | End: 2022-04-08

## 2022-04-08 ENCOUNTER — OFFICE VISIT (OUTPATIENT)
Dept: ONCOLOGY | Facility: HOSPITAL | Age: 72
End: 2022-04-08

## 2022-04-08 VITALS
TEMPERATURE: 96.8 F | OXYGEN SATURATION: 100 % | SYSTOLIC BLOOD PRESSURE: 130 MMHG | WEIGHT: 147.05 LBS | RESPIRATION RATE: 18 BRPM | DIASTOLIC BLOOD PRESSURE: 68 MMHG | BODY MASS INDEX: 25.24 KG/M2 | HEART RATE: 84 BPM

## 2022-04-08 DIAGNOSIS — Z45.2 ENCOUNTER FOR ADJUSTMENT OR MANAGEMENT OF VASCULAR ACCESS DEVICE: Primary | ICD-10-CM

## 2022-04-08 DIAGNOSIS — C25.0 MALIGNANT NEOPLASM OF HEAD OF PANCREAS: Primary | ICD-10-CM

## 2022-04-08 DIAGNOSIS — Z85.07 HISTORY OF PANCREATIC CANCER: ICD-10-CM

## 2022-04-08 PROBLEM — E78.5 HYPERLIPIDEMIA LDL GOAL <70: Status: ACTIVE | Noted: 2022-03-30

## 2022-04-08 PROCEDURE — 96523 IRRIG DRUG DELIVERY DEVICE: CPT

## 2022-04-08 PROCEDURE — 25010000002 HEPARIN LOCK FLUSH PER 10 UNITS: Performed by: INTERNAL MEDICINE

## 2022-04-08 PROCEDURE — G0463 HOSPITAL OUTPT CLINIC VISIT: HCPCS

## 2022-04-08 PROCEDURE — 99214 OFFICE O/P EST MOD 30 MIN: CPT | Performed by: INTERNAL MEDICINE

## 2022-04-08 RX ORDER — FLUCONAZOLE 150 MG/1
TABLET ORAL
COMMUNITY
Start: 2021-12-30 | End: 2022-09-19

## 2022-04-08 RX ORDER — MONTELUKAST SODIUM 10 MG/1
10 TABLET ORAL DAILY
COMMUNITY
Start: 2022-02-05 | End: 2022-09-19

## 2022-04-08 RX ORDER — OMEPRAZOLE 40 MG/1
40 CAPSULE, DELAYED RELEASE ORAL DAILY
COMMUNITY
Start: 2022-04-01

## 2022-04-08 RX ORDER — SODIUM CHLORIDE 0.9 % (FLUSH) 0.9 %
20 SYRINGE (ML) INJECTION AS NEEDED
Status: CANCELLED | OUTPATIENT
Start: 2022-04-08

## 2022-04-08 RX ORDER — SODIUM CHLORIDE 0.9 % (FLUSH) 0.9 %
20 SYRINGE (ML) INJECTION AS NEEDED
Status: DISCONTINUED | OUTPATIENT
Start: 2022-04-08 | End: 2022-04-09 | Stop reason: HOSPADM

## 2022-04-08 RX ORDER — LISINOPRIL AND HYDROCHLOROTHIAZIDE 20; 12.5 MG/1; MG/1
1 TABLET ORAL DAILY
COMMUNITY
Start: 2021-12-31

## 2022-04-08 RX ORDER — DAPAGLIFLOZIN 5 MG/1
5 TABLET, FILM COATED ORAL DAILY
COMMUNITY
Start: 2022-02-23 | End: 2022-09-19

## 2022-04-08 RX ORDER — INSULIN GLARGINE 100 [IU]/ML
INJECTION, SOLUTION SUBCUTANEOUS
COMMUNITY
Start: 2022-02-10

## 2022-04-08 RX ORDER — BLOOD SUGAR DIAGNOSTIC
STRIP MISCELLANEOUS
Status: ON HOLD | COMMUNITY
Start: 2022-02-05 | End: 2022-09-20

## 2022-04-08 RX ORDER — FLUTICASONE PROPIONATE 50 MCG
2 SPRAY, SUSPENSION (ML) NASAL DAILY
COMMUNITY
Start: 2022-04-01

## 2022-04-08 RX ORDER — SERTRALINE HYDROCHLORIDE 100 MG/1
100 TABLET, FILM COATED ORAL DAILY
COMMUNITY
Start: 2022-04-01 | End: 2022-09-28

## 2022-04-08 RX ORDER — HEPARIN SODIUM (PORCINE) LOCK FLUSH IV SOLN 100 UNIT/ML 100 UNIT/ML
500 SOLUTION INTRAVENOUS AS NEEDED
Status: DISCONTINUED | OUTPATIENT
Start: 2022-04-08 | End: 2022-04-09 | Stop reason: HOSPADM

## 2022-04-08 RX ORDER — HEPARIN SODIUM (PORCINE) LOCK FLUSH IV SOLN 100 UNIT/ML 100 UNIT/ML
500 SOLUTION INTRAVENOUS AS NEEDED
Status: CANCELLED | OUTPATIENT
Start: 2022-04-08

## 2022-04-08 RX ADMIN — Medication 20 ML: at 10:54

## 2022-04-08 RX ADMIN — HEPARIN SODIUM (PORCINE) LOCK FLUSH IV SOLN 100 UNIT/ML 500 UNITS: 100 SOLUTION at 10:55

## 2022-04-13 ENCOUNTER — HOSPITAL ENCOUNTER (OUTPATIENT)
Dept: CT IMAGING | Facility: HOSPITAL | Age: 72
Discharge: HOME | End: 2022-04-13
Admitting: INTERNAL MEDICINE

## 2022-04-13 DIAGNOSIS — C25.0 MALIGNANT NEOPLASM OF HEAD OF PANCREAS: ICD-10-CM

## 2022-04-13 PROCEDURE — 71250 CT THORAX DX C-: CPT

## 2022-04-14 ENCOUNTER — OFFICE VISIT (OUTPATIENT)
Dept: ONCOLOGY | Facility: HOSPITAL | Age: 72
End: 2022-04-14

## 2022-04-14 ENCOUNTER — LAB (OUTPATIENT)
Dept: ONCOLOGY | Facility: HOSPITAL | Age: 72
End: 2022-04-14

## 2022-04-14 VITALS
WEIGHT: 146.61 LBS | SYSTOLIC BLOOD PRESSURE: 141 MMHG | BODY MASS INDEX: 25.16 KG/M2 | TEMPERATURE: 97.8 F | RESPIRATION RATE: 18 BRPM | HEART RATE: 84 BPM | OXYGEN SATURATION: 99 % | DIASTOLIC BLOOD PRESSURE: 78 MMHG

## 2022-04-14 DIAGNOSIS — C25.0 MALIGNANT NEOPLASM OF HEAD OF PANCREAS: ICD-10-CM

## 2022-04-14 PROCEDURE — 86301 IMMUNOASSAY TUMOR CA 19-9: CPT

## 2022-04-14 PROCEDURE — 99214 OFFICE O/P EST MOD 30 MIN: CPT | Performed by: INTERNAL MEDICINE

## 2022-04-14 PROCEDURE — G0463 HOSPITAL OUTPT CLINIC VISIT: HCPCS | Performed by: INTERNAL MEDICINE

## 2022-04-14 PROCEDURE — 36415 COLL VENOUS BLD VENIPUNCTURE: CPT

## 2022-04-14 NOTE — PROGRESS NOTES
Patient  Yvrose Duncan    Location  [facility] HEMATOLOGY & ONCOLOGY    Chief Complaint  Pancreatic Cancer    Referring Provider: Brock Quach, *  PCP: Brock Quach PA    Subjective          Oncology/Hematology History Overview Note   Ms. Duncan is a 70-year-old female with pancreatic cancer.     Presented with abdomnial pain and jaundice. 1/21/2020 patient underwent EUS at [place] by Dr. Acevedo, pancreatic head mass, 2.8 x 2.7 cm in size as well as a common bile duct stricture.  She had a stent placed in the common bile duct. Biopsy was negative. The procedure was complicated by pancreatitis, mesenteric vein thrombosis and then E coli sepsis.       Then on 3/5/2020 she had worsening abdominal pain and was readmitted to [facility] with ascending cholangitis secondary to a blocked stent.  On 3/6/2020 the patient underwent ERCP with stent exchange by Dr. Clancy. CT abdomen/pelvis showed cystic areas within the head of the pancreas that may relate to pancreatitis or possibly branch type IPMN.     Referred to Dr. Casillas at the [facility].  She was admitted for feeding tube placement and nutritional optimization and pain control.  Whipple procedure on 4/15/2020.  Pathology revealed invasive moderately to poorly differentiated adenocarcinoma in the pancreas as well as an 5 of 21 lymph nodes.  She also had perineural invasion present.  Pathologic stage was pT2pN2.      Cycle 1 of adjuvant chemotherapy with gemcitabine and Abraxane on 5/22/2020.  Cycle 1 day 8 on 5/29/2020.  Cycle 2-day 1 on 6/15/2020, started doing treatment on days 1 and 15 of a q. 28-day cycle, C2D15 on 6/29/20  Cycle 3-day 1 on 7/13/2020  Cycle 4-day 1 on 8/10/2020    Iron deficiency anemia: Patient was treated with Injectafer on June 8 and 15.     on 3/7/20 was elevated at 273 and improved to 27.3 on 12/17/20.    CT scan on 12/16/20: No evidence of metastatic disease in the chest, abdomen, or pelvis.   Postoperative changes from Whipple procedure are noted.  CT scan on 3/12/2021: Previous Whipple procedure. No definite evidence for active malignancy in the chest, abdomen, or pelvis.     Malignant neoplasm of head of pancreas (HCC)   1/21/2020 Initial Diagnosis    Malignant neoplasm of head of pancreas (CMS/HCC)     4/15/2020 Surgery    Surgery       Procedure:  [facility]: Pathology revealed invasive moderately to poorly differentiated adenocarcinoma in the pancreas as well as an 5 of 21 lymph nodes.  She also had perineural invasion present.  Pathologic stage was pT2pN2.         5/22/2020 - 8/10/2020 Chemotherapy    Gemcitabine and Abraxane x 4 cycles         HPI  Patient comes in today to get the results of her recent CT of the chest.  Fortunately this shows no evidence of disease recurrence in the lungs or otherwise.  She continues to have a decreased appetite.  Her energy is okay but she has not yet gained back any weight.    Review of Systems   Constitutional: Positive for fatigue. Negative for appetite change, diaphoresis, fever, unexpected weight gain and unexpected weight loss.   HENT: Negative for hearing loss, sore throat and voice change.    Eyes: Negative for blurred vision, double vision, pain, redness and visual disturbance.   Respiratory: Negative for cough, shortness of breath and wheezing.    Cardiovascular: Negative for chest pain, palpitations and leg swelling.   Endocrine: Negative for cold intolerance, heat intolerance, polydipsia and polyuria.   Genitourinary: Negative for decreased urine volume, difficulty urinating, frequency and urinary incontinence.   Musculoskeletal: Negative for arthralgias, back pain, joint swelling and myalgias.   Skin: Negative for color change, rash, skin lesions and wound.   Neurological: Negative for dizziness, seizures, numbness and headache.   Hematological: Negative for adenopathy. Does not bruise/bleed easily.    Psychiatric/Behavioral: Negative for depressed mood. The patient is not nervous/anxious.    All other systems reviewed and are negative.      Past Medical History:   Diagnosis Date   • Diabetes (HCC)    • Essential hypertension    • Gallstones    • Heartburn    • History of pancreatic cancer    • No family history of colorectal cancer    • Pancreatic cancer (HCC)    • Sleep apnea      Past Surgical History:   Procedure Laterality Date   • APPENDECTOMY     • COLONOSCOPY     • ERCP     • FOOT SURGERY     • GALLBLADDER SURGERY     • HERNIA REPAIR     • OOPHORECTOMY     • UPPER GASTROINTESTINAL ENDOSCOPY     • WHIPPLE PROCEDURE       Social History     Socioeconomic History   • Marital status:    Tobacco Use   • Smoking status: Never Smoker   Vaping Use   • Vaping Use: Never used   Substance and Sexual Activity   • Alcohol use: Never   • Sexual activity: Defer     Family History   Problem Relation Age of Onset   • Heart attack Mother    • Diabetes Mother    • Heart disease Mother    • Heart attack Father    • Colon cancer Neg Hx        Objective   Physical Exam  General: Alert, cooperative, no acute distress, thin but generally well-appearing  Eyes: Anicteric sclera, PERRLA  Respiratory: normal respiratory effort  Cardiovascular: no lower extremity edema  Abdomen: No tenderness with palpation  Skin: Normal tone, no rash, no lesions  Psychiatric: Appropriate affect, intact judgment  Neurologic: No focal sensory or motor deficits, normal cognition   Musculoskeletal: Normal muscle strength and tone  Extremities: No clubbing, cyanosis, or deformities    Vitals:    04/14/22 1154   BP: 141/78   Pulse: 84   Resp: 18   Temp: 97.8 °F (36.6 °C)   SpO2: 99%   Weight: 66.5 kg (146 lb 9.7 oz)   PainSc: 0-No pain     ECOG score: 0         PHQ-9 Total Score:         Result Review :   The following data was reviewed by: Eboni Acharya MD PhD on 04/14/2022:  Lab Results   Component Value Date    HGB 12.8 04/05/2022    HCT  37.5 04/05/2022    MCV 88.4 04/05/2022     04/05/2022    WBC 7.16 04/05/2022    NEUTROABS 4.59 04/05/2022    LYMPHSABS 1.96 04/05/2022    MONOSABS 0.39 04/05/2022    EOSABS 0.15 04/05/2022    BASOSABS 0.05 04/05/2022     Lab Results   Component Value Date    GLUCOSE 413 (C) 04/05/2022    BUN 17 04/05/2022    CREATININE 0.95 04/05/2022     (L) 04/05/2022    K 3.8 04/05/2022    CL 97 (L) 04/05/2022    CO2 23.1 04/05/2022    CALCIUM 9.4 04/05/2022    PROTEINTOT 7.5 04/05/2022    ALBUMIN 4.60 04/05/2022    BILITOT 0.5 04/05/2022    ALKPHOS 131 (H) 04/05/2022    AST 24 04/05/2022    ALT 30 04/05/2022          Assessment and Plan    Diagnoses and all orders for this visit:    1. Malignant neoplasm of head of pancreas (HCC)  -     MRI abdomen w wo contrast; Future  -     MRI abdomen w wo contrast mrcp; Future  -     Cancer Antigen 19-9; Future      Patient has significant increase in her CA 19-9 at the last visit.  CT of the chest to complete staging did not show any evidence of disease recurrence in the lungs.  I discussed her case with Dr. Jacob in radiology.  He recommended an MRI with and without contrast of the abdomen and MRCP to better evaluate the findings in the pancreas.  I discussed this with the patient and she was agreeable.  I will also repeat a CA 19-9 today.  She will follow-up with me after the MRCP to discuss results.    Patient was given instructions and counseling regarding her condition or for health maintenance advice. Please see specific information pulled into the AVS if appropriate.

## 2022-04-15 LAB — CANCER AG19-9 SERPL-ACNC: 135.3 U/ML

## 2022-04-28 ENCOUNTER — HOSPITAL ENCOUNTER (OUTPATIENT)
Dept: MRI IMAGING | Facility: HOSPITAL | Age: 72
Discharge: HOME | End: 2022-04-28
Admitting: INTERNAL MEDICINE

## 2022-04-28 DIAGNOSIS — C25.0 MALIGNANT NEOPLASM OF HEAD OF PANCREAS: ICD-10-CM

## 2022-04-28 PROCEDURE — 74183 MRI ABD W/O CNTR FLWD CNTR: CPT

## 2022-04-28 PROCEDURE — 0 GADOBENATE DIMEGLUMINE 529 MG/ML SOLUTION: Performed by: INTERNAL MEDICINE

## 2022-04-28 PROCEDURE — A9577 INJ MULTIHANCE: HCPCS | Performed by: INTERNAL MEDICINE

## 2022-04-28 RX ADMIN — GADOBENATE DIMEGLUMINE 14 ML: 529 INJECTION, SOLUTION INTRAVENOUS at 14:00

## 2022-04-29 ENCOUNTER — OFFICE VISIT (OUTPATIENT)
Dept: ONCOLOGY | Facility: HOSPITAL | Age: 72
End: 2022-04-29

## 2022-04-29 ENCOUNTER — HOSPITAL ENCOUNTER (OUTPATIENT)
Dept: ONCOLOGY | Facility: HOSPITAL | Age: 72
Setting detail: INFUSION SERIES
Discharge: HOME | End: 2022-04-29

## 2022-04-29 VITALS
WEIGHT: 147.27 LBS | RESPIRATION RATE: 18 BRPM | SYSTOLIC BLOOD PRESSURE: 140 MMHG | HEART RATE: 67 BPM | BODY MASS INDEX: 25.28 KG/M2 | OXYGEN SATURATION: 100 % | DIASTOLIC BLOOD PRESSURE: 66 MMHG | TEMPERATURE: 97.9 F

## 2022-04-29 DIAGNOSIS — C25.0 MALIGNANT NEOPLASM OF HEAD OF PANCREAS: ICD-10-CM

## 2022-04-29 DIAGNOSIS — Z45.2 ENCOUNTER FOR ADJUSTMENT OR MANAGEMENT OF VASCULAR ACCESS DEVICE: Primary | ICD-10-CM

## 2022-04-29 LAB
ALBUMIN SERPL-MCNC: 4.67 G/DL (ref 3.5–5.2)
ALBUMIN/GLOB SERPL: 1.7 G/DL
ALP SERPL-CCNC: 124 U/L (ref 39–117)
ALT SERPL W P-5'-P-CCNC: 31 U/L (ref 1–33)
ANION GAP SERPL CALCULATED.3IONS-SCNC: 14.8 MMOL/L (ref 5–15)
AST SERPL-CCNC: 38 U/L (ref 1–32)
BILIRUB SERPL-MCNC: 0.3 MG/DL (ref 0–1.2)
BUN SERPL-MCNC: 17 MG/DL (ref 8–23)
BUN/CREAT SERPL: 19.1 (ref 7–25)
CALCIUM SPEC-SCNC: 9.9 MG/DL (ref 8.6–10.5)
CANCER AG19-9 SERPL-ACNC: 116 U/ML
CHLORIDE SERPL-SCNC: 104 MMOL/L (ref 98–107)
CO2 SERPL-SCNC: 21.2 MMOL/L (ref 22–29)
CREAT SERPL-MCNC: 0.89 MG/DL (ref 0.57–1)
EGFRCR SERPLBLD CKD-EPI 2021: 69 ML/MIN/1.73
GLOBULIN UR ELPH-MCNC: 2.8 GM/DL
GLUCOSE SERPL-MCNC: 215 MG/DL (ref 65–99)
POTASSIUM SERPL-SCNC: 3 MMOL/L (ref 3.5–5.2)
PROT SERPL-MCNC: 7.5 G/DL (ref 6–8.5)
SODIUM SERPL-SCNC: 140 MMOL/L (ref 136–145)

## 2022-04-29 PROCEDURE — 36415 COLL VENOUS BLD VENIPUNCTURE: CPT

## 2022-04-29 PROCEDURE — 25010000002 HEPARIN LOCK FLUSH PER 10 UNITS: Performed by: INTERNAL MEDICINE

## 2022-04-29 PROCEDURE — 86301 IMMUNOASSAY TUMOR CA 19-9: CPT | Performed by: INTERNAL MEDICINE

## 2022-04-29 PROCEDURE — 36591 DRAW BLOOD OFF VENOUS DEVICE: CPT

## 2022-04-29 PROCEDURE — 99214 OFFICE O/P EST MOD 30 MIN: CPT | Performed by: INTERNAL MEDICINE

## 2022-04-29 PROCEDURE — 80053 COMPREHEN METABOLIC PANEL: CPT | Performed by: INTERNAL MEDICINE

## 2022-04-29 RX ORDER — SODIUM CHLORIDE 0.9 % (FLUSH) 0.9 %
20 SYRINGE (ML) INJECTION AS NEEDED
Status: DISCONTINUED | OUTPATIENT
Start: 2022-04-29 | End: 2022-04-30 | Stop reason: HOSPADM

## 2022-04-29 RX ORDER — SODIUM CHLORIDE 0.9 % (FLUSH) 0.9 %
20 SYRINGE (ML) INJECTION AS NEEDED
Status: CANCELLED | OUTPATIENT
Start: 2022-04-29

## 2022-04-29 RX ORDER — HEPARIN SODIUM (PORCINE) LOCK FLUSH IV SOLN 100 UNIT/ML 100 UNIT/ML
500 SOLUTION INTRAVENOUS AS NEEDED
Status: CANCELLED | OUTPATIENT
Start: 2022-04-29

## 2022-04-29 RX ORDER — HEPARIN SODIUM (PORCINE) LOCK FLUSH IV SOLN 100 UNIT/ML 100 UNIT/ML
500 SOLUTION INTRAVENOUS AS NEEDED
Status: DISCONTINUED | OUTPATIENT
Start: 2022-04-29 | End: 2022-04-30 | Stop reason: HOSPADM

## 2022-04-29 RX ADMIN — HEPARIN SODIUM (PORCINE) LOCK FLUSH IV SOLN 100 UNIT/ML 500 UNITS: 100 SOLUTION at 10:40

## 2022-04-29 RX ADMIN — Medication 20 ML: at 10:24

## 2022-04-29 NOTE — PROGRESS NOTES
Patient  Yvrose Duncan    Location  [facility] HEMATOLOGY & ONCOLOGY    Chief Complaint  Pancreatic Cancer    Referring Provider: Brock Quach, *  PCP: Brock Quach PA    Subjective          Oncology/Hematology History Overview Note   Ms. Duncan is a 70-year-old female with pancreatic cancer.     Presented with abdomnial pain and jaundice. 1/21/2020 patient underwent EUS at [facility] by Dr. Acevedo, pancreatic head mass, 2.8 x 2.7 cm in size as well as a common bile duct stricture.  She had a stent placed in the common bile duct. Biopsy was negative. The procedure was complicated by pancreatitis, mesenteric vein thrombosis and then E coli sepsis.       Then on 3/5/2020 she had worsening abdominal pain and was readmitted to [facility] with ascending cholangitis secondary to a blocked stent.  On 3/6/2020 the patient underwent ERCP with stent exchange by Dr. Clancy. CT abdomen/pelvis showed cystic areas within the head of the pancreas that may relate to pancreatitis or possibly branch type IPMN.     Referred to Dr. Casillas at the [facility].  She was admitted for feeding tube placement and nutritional optimization and pain control.  Whipple procedure on 4/15/2020.  Pathology revealed invasive moderately to poorly differentiated adenocarcinoma in the pancreas as well as an 5 of 21 lymph nodes.  She also had perineural invasion present.  Pathologic stage was pT2pN2.      Cycle 1 of adjuvant chemotherapy with gemcitabine and Abraxane on 5/22/2020.  Cycle 1 day 8 on 5/29/2020.  Cycle 2-day 1 on 6/15/2020, started doing treatment on days 1 and 15 of a q. 28-day cycle, C2D15 on 6/29/20  Cycle 3-day 1 on 7/13/2020  Cycle 4-day 1 on 8/10/2020    Iron deficiency anemia: Patient was treated with Injectafer on June 8 and 15.     on 3/7/20 was elevated at 273 and improved to 27.3 on 12/17/20.    CT scan on 12/16/20: No evidence of metastatic disease in the chest, abdomen, or pelvis.   Postoperative changes from Whipple procedure are noted.  CT scan on 3/12/2021: Previous Whipple procedure. No definite evidence for active malignancy in the chest, abdomen, or pelvis.     Malignant neoplasm of head of pancreas (HCC)   1/21/2020 Initial Diagnosis    Malignant neoplasm of head of pancreas (CMS/HCC)     4/15/2020 Surgery    Surgery       Procedure:  Whipple by Dr. Casillas at [facility]: Pathology revealed invasive moderately to poorly differentiated adenocarcinoma in the pancreas as well as an 5 of 21 lymph nodes.  She also had perineural invasion present.  Pathologic stage was pT2pN2.         5/22/2020 - 8/10/2020 Chemotherapy    Gemcitabine and Abraxane x 4 cycles         HPI  Patient comes in today to discuss the results of her MRI/MRCP.  There is increased dilation of the main pancreatic duct but no obvious mass.  As the report suggest this could be secondary to stricture but is concerning for malignancy given her rise in CA 19-9.  The patient's daughter was also available by phone and heard this discussion.  Patient reports no new symptoms of concern.  She said the abdominal pain that she had a couple of weeks ago was lower in the abdomen and resolved.    MRI abdomen w wo contrast mrcp    Result Date: 4/28/2022  PROCEDURE: MRI ABDOMEN W WO CONTRAST MRCP  COMPARISON: [facility], CT, CT ABDOMEN PELVIS W CONTRAST, 8/31/2021, 10:45.  [facility], CT, CT ABDOMEN PELVIS W WO CONTRAST, 4/07/2022, 11:06.  [facility], MR, MRI ABD WWO MRCP WO CONTRAST, 1/17/2020, 12:11.  INDICATIONS: Restaging CT scan.  History of pancreatic cancer status post Whipple procedure.  Rising CA 19 9.  CONTRAST: 14 mL  Multihance I.V.  TECHNIQUE: A comprehensive examination was performed utilizing a variety of imaging planes and imaging parameters to optimize visualization of suspected pathology.  Images were obtained both before and after intravenous gadolinium  injection. Magnetic resonance cholangiopancreatography was also performed.  FINDINGS:  The liver is normal in size measuring 11 cm in craniocaudal dimension.  There is no evidence of significant steatosis or iron deposition.  There is no focal liver lesion.  There is some caudate lobe hypertrophy which makes assessment of the hepatic hilum more difficult.  There is small volume pneumobilia identified within the bile ducts likely related to patient's Whipple procedure and hepaticojejunostomy.  There is no apparent biliary ductal dilatation.  The common bile duct remnant measures 6 mm in maximal dimension. The spleen is normal in size measuring 10.3 cm.  The adrenal glands appear unremarkable.  There are postsurgical changes of Whipple with resection of the pancreatic head.  When compared to the prior examinations, there has been increase in dilation of the main pancreatic duct.  This measures up to 9 mm within the pancreatic head neck region.  In the setting of rising CA 19 9, this would be suspicious for recurrence at the pancreatic resection margin.  A benign stricture could potentially cause this increased.  There are collapsed portions of the pancreaticobiliary limb near the hepatic hilum and extending to the anastomosis which make detection of discrete mass difficult.  There is a cystic lesion measuring 9 mm in size best seen on image 9 of series 12. Also near the anastomosis which could reflect chronic findings of pancreatitis.  The kidneys are symmetric in size and enhancement.  There is a 5.5 cm cyst arising from the anterior aspect of the right kidney.  There is a intrinsic T1 hyperintense hemorrhagic or proteinaceous cyst measuring 1.2 cm within the cortex of the mid left kidney.  There is no hydronephrosis.   There is a small bowel containing ventral hernia without associated obstruction.  There is an additional lower umbilical and infraumbilical ventral hernia which also contain portions of small bowel.    There is narrowing and stenosis at the portal splenic confluence.  This appears chronic from the prior examination.  The aorta is normal in caliber without evidence of aneurysm formation.  There is no discrete adenopathy within the upper abdomen.        1. Postsurgical changes of prior Whipple.  When accounting for differences in modality, there appears to be an increased dilation of the main pancreatic duct when compared to the prior CT from 8/31/2021.  In the setting of patient's rise in CA 19-9, this would be suspicious for recurrence near the pancreatic anastomosis.  A benign stricture could also cause increased dilation.  If more definitive localization would be needed for targeted therapy, PET-CT could also be considered for evaluation. 2. No discrete mass is identified at the anastomosis.  Portions of collapsed bowel of the pancreaticobiliary limb are in close proximity to the hepatic hilum and vasculature at the pancreatic anastomosis makes visualization difficult.     LLUVIA CLEMENTS MD       Electronically Signed and Approved By: LLUVIA CLEMENTS MD on 4/28/2022 at 16:20                 Review of Systems   Constitutional: Positive for fatigue. Negative for appetite change, diaphoresis, fever, unexpected weight gain and unexpected weight loss.   HENT: Negative for hearing loss, sore throat and voice change.    Eyes: Negative for blurred vision, double vision, pain, redness and visual disturbance.   Respiratory: Negative for cough, shortness of breath and wheezing.    Cardiovascular: Negative for chest pain, palpitations and leg swelling.   Endocrine: Negative for cold intolerance, heat intolerance, polydipsia and polyuria.   Genitourinary: Negative for decreased urine volume, difficulty urinating, frequency and urinary incontinence.   Musculoskeletal: Negative for arthralgias, back pain, joint swelling and myalgias.   Skin: Negative for color change, rash, skin lesions and wound.   Neurological: Negative  for dizziness, seizures, numbness and headache.   Hematological: Negative for adenopathy. Does not bruise/bleed easily.   Psychiatric/Behavioral: Negative for depressed mood. The patient is not nervous/anxious.    All other systems reviewed and are negative.      Past Medical History:   Diagnosis Date   • Diabetes (HCC)    • Essential hypertension    • Gallstones    • Heartburn    • History of pancreatic cancer    • No family history of colorectal cancer    • Pancreatic cancer (HCC)    • Sleep apnea      Past Surgical History:   Procedure Laterality Date   • APPENDECTOMY     • COLONOSCOPY     • ERCP     • FOOT SURGERY     • GALLBLADDER SURGERY     • HERNIA REPAIR     • OOPHORECTOMY     • UPPER GASTROINTESTINAL ENDOSCOPY     • WHIPPLE PROCEDURE       Social History     Socioeconomic History   • Marital status:    Tobacco Use   • Smoking status: Never Smoker   Vaping Use   • Vaping Use: Never used   Substance and Sexual Activity   • Alcohol use: Never   • Sexual activity: Defer     Family History   Problem Relation Age of Onset   • Heart attack Mother    • Diabetes Mother    • Heart disease Mother    • Heart attack Father    • Colon cancer Neg Hx        Objective   Physical Exam  General: Alert, cooperative, no acute distress, well appearing  Eyes: Anicteric sclera, PERRLA  Respiratory: normal respiratory effort  Cardiovascular: no lower extremity edema  Skin: Normal tone, no rash, no lesions  Psychiatric: Appropriate affect, intact judgment  Neurologic: No focal sensory or motor deficits, normal cognition   Musculoskeletal: Normal muscle strength and tone  Extremities: No clubbing, cyanosis, or deformities    Vitals:    04/29/22 0844   BP: 140/66   Pulse: 67   Resp: 18   Temp: 97.9 °F (36.6 °C)   SpO2: 100%   Weight: 66.8 kg (147 lb 4.3 oz)   PainSc: 0-No pain     ECOG score: 0         PHQ-9 Total Score:         Result Review :   The following data was reviewed by: Eboni Acharya MD PhD on 04/29/2022:  Lab  Results   Component Value Date    HGB 12.8 04/05/2022    HCT 37.5 04/05/2022    MCV 88.4 04/05/2022     04/05/2022    WBC 7.16 04/05/2022    NEUTROABS 4.59 04/05/2022    LYMPHSABS 1.96 04/05/2022    MONOSABS 0.39 04/05/2022    EOSABS 0.15 04/05/2022    BASOSABS 0.05 04/05/2022     Lab Results   Component Value Date    GLUCOSE 215 (H) 04/29/2022    BUN 17 04/29/2022    CREATININE 0.89 04/29/2022     04/29/2022    K 3.0 (L) 04/29/2022     04/29/2022    CO2 21.2 (L) 04/29/2022    CALCIUM 9.9 04/29/2022    PROTEINTOT 7.5 04/29/2022    ALBUMIN 4.67 04/29/2022    BILITOT 0.3 04/29/2022    ALKPHOS 124 (H) 04/29/2022    AST 38 (H) 04/29/2022    ALT 31 04/29/2022          Assessment and Plan    Diagnoses and all orders for this visit:    1. Malignant neoplasm of head of pancreas (HCC)  -     Cancer Antigen 19-9; Future  -     Comprehensive Metabolic Panel; Future  -     CBC & Differential; Future  -     Comprehensive Metabolic Panel; Future  -     Cancer Antigen 19-9; Future      Pancreatic cancer: Patient underwent a Whipple procedure 2 years ago, April 2020.  She was treated with adjuvant chemotherapy.  Recent labs show an increase in CA 19-9 and imaging concerning for pancreatic duct dilation.  The patient remains asymptomatic.  I discussed her case with Dr. Clancy who is her gastroenterologist.  He recommends referral for EUS.  I rechecked the patient's labs including CBC, CMP, and CA 19-9.  If her CA 19-9 remains elevated I will certainly refer her for EUS.  If that number is back down then I may hold off on the referral for now.  I will also discuss her case with Dr. Casillas who is her surgeon the performed a Whipple.  The patient will follow up with me in 1 month for repeat CBC, CMP, and CA 19-9.    Patient was given instructions and counseling regarding her condition or for health maintenance advice. Please see specific information pulled into the AVS if appropriate.

## 2022-04-29 NOTE — ADDENDUM NOTE
Encounter addended by: Jennifer Nieves RN on: 4/29/2022 11:41 AM   Actions taken: MAR administration accepted

## 2022-06-02 ENCOUNTER — LAB (OUTPATIENT)
Dept: LAB | Facility: HOSPITAL | Age: 72
End: 2022-06-02

## 2022-06-02 DIAGNOSIS — C25.0 MALIGNANT NEOPLASM OF HEAD OF PANCREAS: ICD-10-CM

## 2022-06-02 LAB
ALBUMIN SERPL-MCNC: 4.2 G/DL (ref 3.5–5.2)
ALBUMIN/GLOB SERPL: 1.4 G/DL
ALP SERPL-CCNC: 128 U/L (ref 39–117)
ALT SERPL W P-5'-P-CCNC: 30 U/L (ref 1–33)
ANION GAP SERPL CALCULATED.3IONS-SCNC: 15.2 MMOL/L (ref 5–15)
AST SERPL-CCNC: 29 U/L (ref 1–32)
BASOPHILS # BLD AUTO: 0.04 10*3/MM3 (ref 0–0.2)
BASOPHILS NFR BLD AUTO: 0.7 % (ref 0–1.5)
BILIRUB SERPL-MCNC: 0.5 MG/DL (ref 0–1.2)
BUN SERPL-MCNC: 11 MG/DL (ref 8–23)
BUN/CREAT SERPL: 14.5 (ref 7–25)
CALCIUM SPEC-SCNC: 9.2 MG/DL (ref 8.6–10.5)
CANCER AG19-9 SERPL-ACNC: 125 U/ML
CHLORIDE SERPL-SCNC: 103 MMOL/L (ref 98–107)
CO2 SERPL-SCNC: 19.8 MMOL/L (ref 22–29)
CREAT SERPL-MCNC: 0.76 MG/DL (ref 0.57–1)
DEPRECATED RDW RBC AUTO: 40.9 FL (ref 37–54)
EGFRCR SERPLBLD CKD-EPI 2021: 82.9 ML/MIN/1.73
EOSINOPHIL # BLD AUTO: 0.11 10*3/MM3 (ref 0–0.4)
EOSINOPHIL NFR BLD AUTO: 1.8 % (ref 0.3–6.2)
ERYTHROCYTE [DISTWIDTH] IN BLOOD BY AUTOMATED COUNT: 12.1 % (ref 12.3–15.4)
GLOBULIN UR ELPH-MCNC: 2.9 GM/DL
GLUCOSE SERPL-MCNC: 195 MG/DL (ref 65–99)
HCT VFR BLD AUTO: 36.6 % (ref 34–46.6)
HGB BLD-MCNC: 11.8 G/DL (ref 12–15.9)
IMM GRANULOCYTES # BLD AUTO: 0.01 10*3/MM3 (ref 0–0.05)
IMM GRANULOCYTES NFR BLD AUTO: 0.2 % (ref 0–0.5)
LYMPHOCYTES # BLD AUTO: 1.52 10*3/MM3 (ref 0.7–3.1)
LYMPHOCYTES NFR BLD AUTO: 25.3 % (ref 19.6–45.3)
MCH RBC QN AUTO: 29.8 PG (ref 26.6–33)
MCHC RBC AUTO-ENTMCNC: 32.2 G/DL (ref 31.5–35.7)
MCV RBC AUTO: 92.4 FL (ref 79–97)
MONOCYTES # BLD AUTO: 0.33 10*3/MM3 (ref 0.1–0.9)
MONOCYTES NFR BLD AUTO: 5.5 % (ref 5–12)
NEUTROPHILS NFR BLD AUTO: 4 10*3/MM3 (ref 1.7–7)
NEUTROPHILS NFR BLD AUTO: 66.5 % (ref 42.7–76)
NRBC BLD AUTO-RTO: 0 /100 WBC (ref 0–0.2)
PLATELET # BLD AUTO: 115 10*3/MM3 (ref 140–450)
PMV BLD AUTO: 11.2 FL (ref 6–12)
POTASSIUM SERPL-SCNC: 3.5 MMOL/L (ref 3.5–5.2)
PROT SERPL-MCNC: 7.1 G/DL (ref 6–8.5)
RBC # BLD AUTO: 3.96 10*6/MM3 (ref 3.77–5.28)
SODIUM SERPL-SCNC: 138 MMOL/L (ref 136–145)
WBC NRBC COR # BLD: 6.01 10*3/MM3 (ref 3.4–10.8)

## 2022-06-02 PROCEDURE — 80053 COMPREHEN METABOLIC PANEL: CPT

## 2022-06-02 PROCEDURE — 36415 COLL VENOUS BLD VENIPUNCTURE: CPT

## 2022-06-02 PROCEDURE — 86301 IMMUNOASSAY TUMOR CA 19-9: CPT

## 2022-06-02 PROCEDURE — 85025 COMPLETE CBC W/AUTO DIFF WBC: CPT

## 2022-06-06 ENCOUNTER — OFFICE VISIT (OUTPATIENT)
Dept: ONCOLOGY | Facility: HOSPITAL | Age: 72
End: 2022-06-06

## 2022-06-06 VITALS
DIASTOLIC BLOOD PRESSURE: 71 MMHG | RESPIRATION RATE: 18 BRPM | HEART RATE: 70 BPM | SYSTOLIC BLOOD PRESSURE: 148 MMHG | WEIGHT: 147.05 LBS | TEMPERATURE: 98 F | BODY MASS INDEX: 25.24 KG/M2 | OXYGEN SATURATION: 98 %

## 2022-06-06 DIAGNOSIS — C25.0 MALIGNANT NEOPLASM OF HEAD OF PANCREAS: Primary | ICD-10-CM

## 2022-06-06 DIAGNOSIS — Z45.2 ENCOUNTER FOR ADJUSTMENT OR MANAGEMENT OF VASCULAR ACCESS DEVICE: ICD-10-CM

## 2022-06-06 PROCEDURE — 99214 OFFICE O/P EST MOD 30 MIN: CPT | Performed by: INTERNAL MEDICINE

## 2022-06-06 PROCEDURE — G0463 HOSPITAL OUTPT CLINIC VISIT: HCPCS | Performed by: INTERNAL MEDICINE

## 2022-06-06 NOTE — PROGRESS NOTES
Patient  Yvrose Duncan    Location  [facility] HEMATOLOGY & ONCOLOGY    Chief Complaint  Pancreatic Cancer    Referring Provider: Brock Quach, *  PCP: Brock Quach PA    Subjective          Oncology/Hematology History Overview Note   Ms. Duncan is a 70-year-old female with pancreatic cancer.     Presented with abdomnial pain and jaundice. 1/21/2020 patient underwent EUS at [place] by Dr. Acevedo, pancreatic head mass, 2.8 x 2.7 cm in size as well as a common bile duct stricture.  She had a stent placed in the common bile duct. Biopsy was negative. The procedure was complicated by pancreatitis, mesenteric vein thrombosis and then E coli sepsis.       Then on 3/5/2020 she had worsening abdominal pain and was readmitted to [facility] with ascending cholangitis secondary to a blocked stent.  On 3/6/2020 the patient underwent ERCP with stent exchange by Dr. Clancy. CT abdomen/pelvis showed cystic areas within the head of the pancreas that may relate to pancreatitis or possibly branch type IPMN.     Referred to Dr. Casillas at the [facility].  She was admitted for feeding tube placement and nutritional optimization and pain control.  Whipple procedure on 4/15/2020.  Pathology revealed invasive moderately to poorly differentiated adenocarcinoma in the pancreas as well as an 5 of 21 lymph nodes.  She also had perineural invasion present.  Pathologic stage was pT2pN2.      Cycle 1 of adjuvant chemotherapy with gemcitabine and Abraxane on 5/22/2020.  Cycle 1 day 8 on 5/29/2020.  Cycle 2-day 1 on 6/15/2020, started doing treatment on days 1 and 15 of a q. 28-day cycle, C2D15 on 6/29/20  Cycle 3-day 1 on 7/13/2020  Cycle 4-day 1 on 8/10/2020    Iron deficiency anemia: Patient was treated with Injectafer on June 8 and 15.     on 3/7/20 was elevated at 273 and improved to 27.3 on 12/17/20.    CT scan on 12/16/20: No evidence of metastatic disease in the chest, abdomen, or pelvis.   Postoperative changes from Whipple procedure are noted.  CT scan on 3/12/2021: Previous Whipple procedure. No definite evidence for active malignancy in the chest, abdomen, or pelvis.     Malignant neoplasm of head of pancreas (HCC)   1/21/2020 Initial Diagnosis    Malignant neoplasm of head of pancreas (CMS/HCC)     4/15/2020 Surgery    Surgery       Procedure:  Whipple by Dr. Casillas at [facility]: Pathology revealed invasive moderately to poorly differentiated adenocarcinoma in the pancreas as well as an 5 of 21 lymph nodes.  She also had perineural invasion present.  Pathologic stage was pT2pN2.         5/22/2020 - 8/10/2020 Chemotherapy    Gemcitabine and Abraxane x 4 cycles         HPI  Patient comes in today for follow-up on recent labs.  Unfortunately her CA 19-9 has increased again to 125.  Previously it was 116.  She has no new complaints or concerns.  She still has some lower abdominal pain that is most significant after she has a bowel movement.  She also feels that she has more gas but does not feel particularly bloated.  She believes her symptoms are improved when she increases the dose of Creon.  She does continue to complain of some weight loss but on review of the chart her weight has been stable over the past couple of months.    She tells me her port has not been working very well.  The last 2 times they have been able to flush it but have not been able to draw her labs.  She does not want to keep it if it is not being used.    Review of Systems   Constitutional: Positive for fatigue. Negative for appetite change, diaphoresis, fever, unexpected weight gain and unexpected weight loss.   HENT: Negative for hearing loss, sore throat and voice change.    Eyes: Negative for blurred vision, double vision, pain, redness and visual disturbance.   Respiratory: Negative for cough, shortness of breath and wheezing.    Cardiovascular: Negative for chest pain, palpitations and leg swelling.    Endocrine: Negative for cold intolerance, heat intolerance, polydipsia and polyuria.   Genitourinary: Negative for decreased urine volume, difficulty urinating, frequency and urinary incontinence.   Musculoskeletal: Negative for arthralgias, back pain, joint swelling and myalgias.   Skin: Negative for color change, rash, skin lesions and wound.   Neurological: Negative for dizziness, seizures, numbness and headache.   Hematological: Negative for adenopathy. Does not bruise/bleed easily.   Psychiatric/Behavioral: Negative for depressed mood. The patient is not nervous/anxious.    All other systems reviewed and are negative.      Past Medical History:   Diagnosis Date   • Diabetes (HCC)    • Essential hypertension    • Gallstones    • Heartburn    • History of pancreatic cancer    • No family history of colorectal cancer    • Pancreatic cancer (HCC)    • Sleep apnea      Past Surgical History:   Procedure Laterality Date   • APPENDECTOMY     • COLONOSCOPY     • ERCP     • FOOT SURGERY     • GALLBLADDER SURGERY     • HERNIA REPAIR     • OOPHORECTOMY     • UPPER GASTROINTESTINAL ENDOSCOPY     • WHIPPLE PROCEDURE       Social History     Socioeconomic History   • Marital status:    Tobacco Use   • Smoking status: Never Smoker   Vaping Use   • Vaping Use: Never used   Substance and Sexual Activity   • Alcohol use: Never   • Sexual activity: Defer     Family History   Problem Relation Age of Onset   • Heart attack Mother    • Diabetes Mother    • Heart disease Mother    • Heart attack Father    • Colon cancer Neg Hx        Objective   Physical Exam  General: Alert, cooperative, no acute distress  Eyes: Anicteric sclera, PERRLA  Respiratory: normal respiratory effort  Cardiovascular: no lower extremity edema  Skin: Normal tone, no rash, no lesions  Psychiatric: Appropriate affect, intact judgment  Neurologic: No focal sensory or motor deficits, normal cognition   Musculoskeletal: Normal muscle strength and  tone  Extremities: No clubbing, cyanosis, or deformities    Vitals:    06/06/22 1050   BP: 148/71   Pulse: 70   Resp: 18   Temp: 98 °F (36.7 °C)   SpO2: 98%   Weight: 66.7 kg (147 lb 0.8 oz)   PainSc: 0-No pain     ECOG score: 0         PHQ-9 Total Score:         Result Review :   The following data was reviewed by: Eboni Acharya MD PhD on 06/06/2022:  Lab Results   Component Value Date    HGB 11.8 (L) 06/02/2022    HCT 36.6 06/02/2022    MCV 92.4 06/02/2022     (L) 06/02/2022    WBC 6.01 06/02/2022    NEUTROABS 4.00 06/02/2022    LYMPHSABS 1.52 06/02/2022    MONOSABS 0.33 06/02/2022    EOSABS 0.11 06/02/2022    BASOSABS 0.04 06/02/2022     Lab Results   Component Value Date    GLUCOSE 195 (H) 06/02/2022    BUN 11 06/02/2022    CREATININE 0.76 06/02/2022     06/02/2022    K 3.5 06/02/2022     06/02/2022    CO2 19.8 (L) 06/02/2022    CALCIUM 9.2 06/02/2022    PROTEINTOT 7.1 06/02/2022    ALBUMIN 4.20 06/02/2022    BILITOT 0.5 06/02/2022    ALKPHOS 128 (H) 06/02/2022    AST 29 06/02/2022    ALT 30 06/02/2022          Assessment and Plan    Diagnoses and all orders for this visit:    1. Malignant neoplasm of head of pancreas (HCC) (Primary)  -     CBC & Differential; Future  -     Comprehensive Metabolic Panel; Future  -     Cancer Antigen 19-9; Future    2. Encounter for adjustment or management of vascular access device  -     IR Port Study Including Fluoro; Future        Pancreatic cancer: Patient underwent a Whipple procedure 2 years ago, April 2020.  She was treated with adjuvant chemotherapy.  Recent labs show an increase in CA 19-9.  Today the value is 125 which is not significantly changed.  She complains only of some lower abdominal pain after bowel movements.  Despite recent weight loss her weight has been stable over the past 2 months.  I discussed her case with her surgeon, Dr. Casillas.  He recommends a PET scan to rule out systemic disease.  We discussed various treatment options if  there is evidence of cancer recurrence.  He tells me that he has had cases where disease recurrence is not evident but the CA 19-9 remains very elevated.  In that case he recommends sending a CT DNA test such as guardant reveal.  If both tests suggest recurrent disease but imaging does not suggest recurrence, there have been cases where radiation is recommended.      I will order the PET scan and plan for the patient to follow-up with me afterwards to discuss the results.    Port malfunction: I will send the patient for port study and follow-up at the next appointment.  If there is a fibrin sheath then we will discuss removal and possible replacement.        Patient was given instructions and counseling regarding her condition or for health maintenance advice. Please see specific information pulled into the AVS if appropriate.

## 2022-06-20 ENCOUNTER — HOSPITAL ENCOUNTER (OUTPATIENT)
Dept: INTERVENTIONAL RADIOLOGY/VASCULAR | Facility: HOSPITAL | Age: 72
Discharge: HOME | End: 2022-06-20
Admitting: INTERNAL MEDICINE

## 2022-06-20 DIAGNOSIS — Z45.2 ENCOUNTER FOR ADJUSTMENT OR MANAGEMENT OF VASCULAR ACCESS DEVICE: ICD-10-CM

## 2022-06-20 PROCEDURE — 0 IOPAMIDOL PER 1 ML: Performed by: INTERNAL MEDICINE

## 2022-06-20 PROCEDURE — 36598 INJ W/FLUOR EVAL CV DEVICE: CPT

## 2022-06-20 RX ORDER — HEPARIN SODIUM (PORCINE) LOCK FLUSH IV SOLN 100 UNIT/ML 100 UNIT/ML
SOLUTION INTRAVENOUS
Status: DISCONTINUED
Start: 2022-06-20 | End: 2022-06-21 | Stop reason: HOSPADM

## 2022-06-20 RX ADMIN — IOPAMIDOL 5 ML: 755 INJECTION, SOLUTION INTRAVENOUS at 13:35

## 2022-06-23 ENCOUNTER — TRANSCRIBE ORDERS (OUTPATIENT)
Dept: ADMINISTRATIVE | Facility: HOSPITAL | Age: 72
End: 2022-06-23

## 2022-06-23 DIAGNOSIS — R10.30 LOWER ABDOMINAL PAIN: Primary | ICD-10-CM

## 2022-06-24 ENCOUNTER — HOSPITAL ENCOUNTER (EMERGENCY)
Facility: HOSPITAL | Age: 72
Discharge: HOME | End: 2022-06-24
Attending: EMERGENCY MEDICINE | Admitting: EMERGENCY MEDICINE

## 2022-06-24 ENCOUNTER — HOSPITAL ENCOUNTER (OUTPATIENT)
Dept: ULTRASOUND IMAGING | Facility: HOSPITAL | Age: 72
Discharge: HOME | End: 2022-06-24

## 2022-06-24 ENCOUNTER — TRANSCRIBE ORDERS (OUTPATIENT)
Dept: ADMINISTRATIVE | Facility: HOSPITAL | Age: 72
End: 2022-06-24

## 2022-06-24 ENCOUNTER — HOSPITAL ENCOUNTER (OUTPATIENT)
Dept: CT IMAGING | Facility: HOSPITAL | Age: 72
Discharge: HOME | End: 2022-06-24

## 2022-06-24 VITALS
HEART RATE: 93 BPM | WEIGHT: 144.18 LBS | DIASTOLIC BLOOD PRESSURE: 92 MMHG | HEIGHT: 64 IN | TEMPERATURE: 97.4 F | OXYGEN SATURATION: 100 % | RESPIRATION RATE: 18 BRPM | SYSTOLIC BLOOD PRESSURE: 185 MMHG | BODY MASS INDEX: 24.62 KG/M2

## 2022-06-24 DIAGNOSIS — R10.30 LOWER ABDOMINAL PAIN: ICD-10-CM

## 2022-06-24 DIAGNOSIS — I10 UNCONTROLLED HYPERTENSION: ICD-10-CM

## 2022-06-24 DIAGNOSIS — K43.9 VENTRAL HERNIA WITHOUT OBSTRUCTION OR GANGRENE: Primary | ICD-10-CM

## 2022-06-24 DIAGNOSIS — R10.30 LOWER ABDOMINAL PAIN: Primary | ICD-10-CM

## 2022-06-24 DIAGNOSIS — K52.9 COLITIS: ICD-10-CM

## 2022-06-24 DIAGNOSIS — K62.5 RECTAL BLEEDING: ICD-10-CM

## 2022-06-24 LAB
ABO GROUP BLD: NORMAL
ALBUMIN SERPL-MCNC: 4.5 G/DL (ref 3.5–5.2)
ALBUMIN/GLOB SERPL: 1.6 G/DL
ALP SERPL-CCNC: 129 U/L (ref 39–117)
ALT SERPL W P-5'-P-CCNC: 29 U/L (ref 1–33)
ANION GAP SERPL CALCULATED.3IONS-SCNC: 18.8 MMOL/L (ref 5–15)
AST SERPL-CCNC: 34 U/L (ref 1–32)
BASOPHILS # BLD AUTO: 0.04 10*3/MM3 (ref 0–0.2)
BASOPHILS NFR BLD AUTO: 0.4 % (ref 0–1.5)
BILIRUB SERPL-MCNC: 0.8 MG/DL (ref 0–1.2)
BILIRUB UR QL STRIP: NEGATIVE
BLD GP AB SCN SERPL QL: NEGATIVE
BUN SERPL-MCNC: 11 MG/DL (ref 8–23)
BUN/CREAT SERPL: 14.1 (ref 7–25)
CALCIUM SPEC-SCNC: 9.7 MG/DL (ref 8.6–10.5)
CHLORIDE SERPL-SCNC: 101 MMOL/L (ref 98–107)
CLARITY UR: CLEAR
CO2 SERPL-SCNC: 20.2 MMOL/L (ref 22–29)
COLOR UR: YELLOW
CREAT SERPL-MCNC: 0.78 MG/DL (ref 0.57–1)
DEPRECATED RDW RBC AUTO: 44.5 FL (ref 37–54)
EGFRCR SERPLBLD CKD-EPI 2021: 80.3 ML/MIN/1.73
EOSINOPHIL # BLD AUTO: 0.04 10*3/MM3 (ref 0–0.4)
EOSINOPHIL NFR BLD AUTO: 0.4 % (ref 0.3–6.2)
ERYTHROCYTE [DISTWIDTH] IN BLOOD BY AUTOMATED COUNT: 13.2 % (ref 12.3–15.4)
GLOBULIN UR ELPH-MCNC: 2.9 GM/DL
GLUCOSE SERPL-MCNC: 251 MG/DL (ref 65–99)
GLUCOSE UR STRIP-MCNC: ABNORMAL MG/DL
HCT VFR BLD AUTO: 34.6 % (ref 34–46.6)
HGB BLD-MCNC: 11.4 G/DL (ref 12–15.9)
HGB UR QL STRIP.AUTO: NEGATIVE
HOLD SPECIMEN: NORMAL
HOLD SPECIMEN: NORMAL
IMM GRANULOCYTES # BLD AUTO: 0.03 10*3/MM3 (ref 0–0.05)
IMM GRANULOCYTES NFR BLD AUTO: 0.3 % (ref 0–0.5)
KETONES UR QL STRIP: ABNORMAL
LEUKOCYTE ESTERASE UR QL STRIP.AUTO: NEGATIVE
LYMPHOCYTES # BLD AUTO: 2.07 10*3/MM3 (ref 0.7–3.1)
LYMPHOCYTES NFR BLD AUTO: 21.7 % (ref 19.6–45.3)
MCH RBC QN AUTO: 30.1 PG (ref 26.6–33)
MCHC RBC AUTO-ENTMCNC: 32.9 G/DL (ref 31.5–35.7)
MCV RBC AUTO: 91.3 FL (ref 79–97)
MONOCYTES # BLD AUTO: 0.54 10*3/MM3 (ref 0.1–0.9)
MONOCYTES NFR BLD AUTO: 5.6 % (ref 5–12)
NEUTROPHILS NFR BLD AUTO: 6.84 10*3/MM3 (ref 1.7–7)
NEUTROPHILS NFR BLD AUTO: 71.6 % (ref 42.7–76)
NITRITE UR QL STRIP: NEGATIVE
NRBC BLD AUTO-RTO: 0 /100 WBC (ref 0–0.2)
PH UR STRIP.AUTO: 5.5 [PH] (ref 5–8)
PLATELET # BLD AUTO: 131 10*3/MM3 (ref 140–450)
PMV BLD AUTO: 10 FL (ref 6–12)
POTASSIUM SERPL-SCNC: 3.6 MMOL/L (ref 3.5–5.2)
PROT SERPL-MCNC: 7.4 G/DL (ref 6–8.5)
PROT UR QL STRIP: NEGATIVE
RBC # BLD AUTO: 3.79 10*6/MM3 (ref 3.77–5.28)
RH BLD: POSITIVE
SODIUM SERPL-SCNC: 140 MMOL/L (ref 136–145)
SP GR UR STRIP: >=1.03 (ref 1–1.03)
T&S EXPIRATION DATE: NORMAL
UROBILINOGEN UR QL STRIP: ABNORMAL
WBC NRBC COR # BLD: 9.56 10*3/MM3 (ref 3.4–10.8)
WHOLE BLOOD HOLD COAG: NORMAL

## 2022-06-24 PROCEDURE — 87086 URINE CULTURE/COLONY COUNT: CPT | Performed by: EMERGENCY MEDICINE

## 2022-06-24 PROCEDURE — 81003 URINALYSIS AUTO W/O SCOPE: CPT | Performed by: EMERGENCY MEDICINE

## 2022-06-24 PROCEDURE — 36415 COLL VENOUS BLD VENIPUNCTURE: CPT

## 2022-06-24 PROCEDURE — 86900 BLOOD TYPING SEROLOGIC ABO: CPT

## 2022-06-24 PROCEDURE — 99283 EMERGENCY DEPT VISIT LOW MDM: CPT

## 2022-06-24 PROCEDURE — 80053 COMPREHEN METABOLIC PANEL: CPT

## 2022-06-24 PROCEDURE — 85025 COMPLETE CBC W/AUTO DIFF WBC: CPT

## 2022-06-24 PROCEDURE — 86850 RBC ANTIBODY SCREEN: CPT

## 2022-06-24 PROCEDURE — 74176 CT ABD & PELVIS W/O CONTRAST: CPT

## 2022-06-24 PROCEDURE — 86901 BLOOD TYPING SEROLOGIC RH(D): CPT

## 2022-06-24 RX ORDER — DICYCLOMINE HCL 20 MG
20 TABLET ORAL EVERY 6 HOURS
Qty: 28 TABLET | Refills: 0 | Status: SHIPPED | OUTPATIENT
Start: 2022-06-24 | End: 2022-07-01

## 2022-06-24 RX ORDER — ONDANSETRON 4 MG/1
8 TABLET, ORALLY DISINTEGRATING ORAL EVERY 8 HOURS PRN
Qty: 15 TABLET | Refills: 0 | Status: SHIPPED | OUTPATIENT
Start: 2022-06-24 | End: 2022-06-29

## 2022-06-24 RX ORDER — SODIUM CHLORIDE 0.9 % (FLUSH) 0.9 %
10 SYRINGE (ML) INJECTION AS NEEDED
Status: DISCONTINUED | OUTPATIENT
Start: 2022-06-24 | End: 2022-06-24 | Stop reason: HOSPADM

## 2022-06-24 RX ORDER — HYDROCODONE BITARTRATE AND ACETAMINOPHEN 5; 325 MG/1; MG/1
1 TABLET ORAL EVERY 4 HOURS PRN
Qty: 18 TABLET | Refills: 0 | Status: SHIPPED | OUTPATIENT
Start: 2022-06-24 | End: 2022-06-27

## 2022-06-24 RX ORDER — AMOXICILLIN AND CLAVULANATE POTASSIUM 875; 125 MG/1; MG/1
1 TABLET, FILM COATED ORAL 2 TIMES DAILY
Qty: 20 TABLET | Refills: 0 | Status: SHIPPED | OUTPATIENT
Start: 2022-06-24 | End: 2022-07-04

## 2022-06-24 NOTE — ED PROVIDER NOTES
Time: 6:24 PM EDT  Arrived by: private car  Chief Complaint: rectal bleeding   History provided by: patient/ daughter   History is limited by: N/A     History of Present Illness:  Patient is a 72 y.o. year old female who presents to the emergency department with her daugther at bedside complaining of blood on toilet paper after wiping with bowel movement, no blood inside of stool noted . Patient also complains of lower abdoinal pain x 2 weeks gradually worsened that is worse with eating or bowel movements. Patient states last instance of blood was  this morning, no other bowel movements today. Patient denies vomiting. Patient was recently treated with macrobid for uti last week. She denies anticoagulant use and admits to a pmhx of hemorrhoids. Patient was seen by PCP last week and ordered a CT scan for today           HPI    Similar Symptoms Previously: no  Recently seen: yes by PCP last week       Patient Care Team  Primary Care Provider: Brock Quach PA    Past Medical History:     Allergies   Allergen Reactions   • Adhesive Tape Hives and Itching     Blisters   • Latex Other (See Comments)     Blistering of skin     Past Medical History:   Diagnosis Date   • Diabetes (HCC)    • Essential hypertension    • Gallstones    • Heartburn    • History of pancreatic cancer    • No family history of colorectal cancer    • Pancreatic cancer (HCC)    • Sleep apnea      Past Surgical History:   Procedure Laterality Date   • APPENDECTOMY     • COLONOSCOPY     • ERCP     • FOOT SURGERY     • GALLBLADDER SURGERY     • HERNIA REPAIR     • OOPHORECTOMY     • UPPER GASTROINTESTINAL ENDOSCOPY     • WHIPPLE PROCEDURE       Family History   Problem Relation Age of Onset   • Heart attack Mother    • Diabetes Mother    • Heart disease Mother    • Heart attack Father    • Colon cancer Neg Hx        Home Medications:  Prior to Admission medications    Medication Sig Start Date End Date Taking? Authorizing Provider    atorvastatin (LIPITOR) 20 MG tablet Take 20 mg by mouth Every Night. 4/14/21   Ganesh Crum MD   busPIRone (BUSPAR) 10 MG tablet Take 10 mg by mouth 2 (Two) Times a Day. 5/19/21   Ganesh Crum MD   carvedilol (COREG) 6.25 MG tablet carvedilol 6.25 mg oral tablet take 1 tablet (6.25 mg) by oral route 2 times per day with food   Suspended    Ganesh Crum MD   Creon 6000-33970 units capsule delayed-release particles capsule Take 18,000 units of lipase by mouth 3 (Three) Times a Day. 5/26/21   Ganesh Crum MD   Dulaglutide (Trulicity) 0.75 MG/0.5ML solution pen-injector     Ganesh Crum MD   Farxiga 5 MG tablet tablet Take 5 mg by mouth Daily. 2/23/22   Ganesh Crum MD   fluconazole (DIFLUCAN) 150 MG tablet TAKE 1 TABLET BY MOUTH AS A ONE TIME DOSE, REPEAT IN 3 DAYS 12/30/21   Ganesh Crum MD   fluticasone (FLONASE) 50 MCG/ACT nasal spray  5/12/21   Ganesh Crum MD   fluticasone (FLONASE) 50 MCG/ACT nasal spray 2 sprays into the nostril(s) as directed by provider Daily. 4/1/22   Ganesh Crum MD   insulin aspart (novoLOG) 100 UNIT/ML injection Inject 5-10 Units under the skin into the appropriate area as directed 3 (Three) Times a Day. 11/9/21   Ganesh Crum MD   Insulin Glargine (BASAGLAR KWIKPEN) 100 UNIT/ML injection pen INJECT 15 TO 25 UNITS SUBCUTANEOUSLY NIGHTLY USING SLIDING SCALE 2/10/22   Ganesh Crum MD   Levemir 100 UNIT/ML injection INJECT 10 UNITS SUBCUTANEOUSLY ONCE DAILY 4/21/21   Ganesh Crum MD   lisinopril-hydrochlorothiazide (PRINZIDE,ZESTORETIC) 20-12.5 MG per tablet Take 1 tablet by mouth Daily. 12/31/21   Ganesh Crum MD   loratadine (CLARITIN) 10 MG tablet Take 10 mg by mouth Daily. 5/22/21   Ganesh Crum MD   Mag64 64 MG DR tablet Take  by mouth Daily. 6/9/21   Ganesh Crum MD   Magnesium 65 MG tablet Take  by mouth.    Ganesh Crum MD   metFORMIN  (GLUCOPHAGE) 1000 MG tablet  5/14/21   Ganesh Crum MD   montelukast (SINGULAIR) 10 MG tablet Take 10 mg by mouth Daily. 2/5/22   Ganesh Crum MD   omeprazole (priLOSEC) 40 MG capsule  3/22/21   Ganesh Crum MD   omeprazole (priLOSEC) 40 MG capsule Take 40 mg by mouth Daily. 4/1/22   Ganesh Crum MD   OneTouch Ultra test strip USE 1 STRIP TO CHECK GLUCOSE ONCE DAILY AS DIRECTED 2/5/22   Ganesh Crum MD   prazosin (MINIPRESS) 2 MG capsule Take 2 mg by mouth Every Night. 5/27/21   Ganesh Crum MD   sertraline (ZOLOFT) 100 MG tablet  5/22/21   Ganesh Crum MD   sertraline (ZOLOFT) 100 MG tablet Take 100 mg by mouth Daily. 4/1/22 9/28/22  Ganesh Crum MD   sodium chloride 1 g tablet Take 1 g by mouth 3 (Three) Times a Day.    Ganesh Crum MD   traZODone (DESYREL) 100 MG tablet  5/12/21   Ganesh Crum MD   Turmeric 400 MG capsule turmeric 400 mg oral capsule take 1 capsule by oral route daily   Active    Ganesh Crum MD        Social History:   Social History     Tobacco Use   • Smoking status: Never Smoker   Vaping Use   • Vaping Use: Never used   Substance Use Topics   • Alcohol use: Never     Recent travel: no     Review of Systems:  Review of Systems   Constitutional: Negative for chills, diaphoresis and fever.   HENT: Negative for congestion, postnasal drip, rhinorrhea and sore throat.    Eyes: Negative for photophobia.   Respiratory: Negative for cough, chest tightness and shortness of breath.    Cardiovascular: Negative for chest pain, palpitations and leg swelling.   Gastrointestinal: Negative for abdominal pain, diarrhea, nausea and vomiting.        Blood when wiping after bowel movement    Genitourinary: Negative for difficulty urinating, dysuria, flank pain, frequency, hematuria and urgency.   Musculoskeletal: Negative for neck pain and neck stiffness.   Skin: Negative for pallor and rash.   Neurological:  "Negative for dizziness, syncope, weakness, numbness and headaches.   Hematological: Negative for adenopathy. Does not bruise/bleed easily.   Psychiatric/Behavioral: Negative.         Physical Exam:  BP (!) 185/92   Pulse 93   Temp 97.4 °F (36.3 °C) (Oral)   Resp 18   Ht 162.6 cm (64\")   Wt 65.4 kg (144 lb 2.9 oz)   SpO2 100%   BMI 24.75 kg/m²     Physical Exam  Vitals and nursing note reviewed. Exam conducted with a chaperone present.   Constitutional:       General: She is not in acute distress.     Appearance: Normal appearance. She is not ill-appearing, toxic-appearing or diaphoretic.   HENT:      Head: Normocephalic and atraumatic.      Mouth/Throat:      Mouth: Mucous membranes are moist.   Eyes:      Pupils: Pupils are equal, round, and reactive to light.   Cardiovascular:      Rate and Rhythm: Normal rate and regular rhythm.      Pulses:           Carotid pulses are 1+ on the right side and 1+ on the left side.       Radial pulses are 1+ on the right side and 1+ on the left side.        Femoral pulses are 1+ on the right side and 1+ on the left side.       Popliteal pulses are 1+ on the right side and 1+ on the left side.        Dorsalis pedis pulses are 1+ on the right side and 1+ on the left side.        Posterior tibial pulses are 1+ on the right side and 1+ on the left side.      Heart sounds: Normal heart sounds. No murmur heard.  Pulmonary:      Effort: Pulmonary effort is normal. No accessory muscle usage, respiratory distress or retractions.      Breath sounds: Normal breath sounds. No decreased breath sounds, wheezing, rhonchi or rales.      Comments: Port to right anterior superior chest wall, no edema or gas  Abdominal:      General: Abdomen is flat. A surgical scar is present. There is no distension.      Palpations: Abdomen is soft. There is no mass.      Tenderness: There is abdominal tenderness (left upper abdomen ). There is no right CVA tenderness, left CVA tenderness, guarding or " rebound.      Hernia: A hernia (reduciblle soft hernia in midline ) is present.      Comments: No rigidity   Genitourinary:     Rectum: External hemorrhoid present. No anal fissure.      Comments: Not inflamed thrombosed, bleeding   Musculoskeletal:         General: No swelling, tenderness or deformity.      Cervical back: Neck supple. No tenderness.      Right lower leg: No edema.      Left lower leg: No edema.   Skin:     General: Skin is warm and dry.      Capillary Refill: Capillary refill takes less than 2 seconds.      Coloration: Skin is not jaundiced or pale.      Findings: No erythema.      Comments: No pallor   Neurological:      General: No focal deficit present.      Mental Status: She is alert and oriented to person, place, and time. Mental status is at baseline.      Sensory: No sensory deficit.      Motor: No weakness.   Psychiatric:         Mood and Affect: Mood normal.         Behavior: Behavior normal.                Medications in the Emergency Department:  Medications   sodium chloride 0.9 % flush 10 mL (has no administration in time range)        Labs  Lab Results (last 24 hours)     Procedure Component Value Units Date/Time    CBC & Differential [[id number]]  (Abnormal) Collected: 06/24/22 1510    Specimen: Blood from Arm, Right Updated: 06/24/22 2719    Narrative:      The following orders were created for panel order CBC & Differential.  Procedure                               Abnormality         Status                     ---------                               -----------         ------                     CBC Auto Differential[[id number]]        Abnormal            Final result                 Please view results for these tests on the individual orders.    Comprehensive Metabolic Panel [[id number]]  (Abnormal) Collected: 06/24/22 1510    Specimen: Blood from Arm, Right Updated: 06/24/22 1614     Glucose 251 mg/dL      BUN 11 mg/dL      Creatinine 0.78 mg/dL      Sodium 140 mmol/L       Potassium 3.6 mmol/L      Chloride 101 mmol/L      CO2 20.2 mmol/L      Calcium 9.7 mg/dL      Total Protein 7.4 g/dL      Albumin 4.50 g/dL      ALT (SGPT) 29 U/L      AST (SGOT) 34 U/L      Alkaline Phosphatase 129 U/L      Total Bilirubin 0.8 mg/dL      Globulin 2.9 gm/dL      A/G Ratio 1.6 g/dL      BUN/Creatinine Ratio 14.1     Anion Gap 18.8 mmol/L      eGFR 80.3 mL/min/1.73      Comment: National Kidney Foundation and American Society of Nephrology (ASN) Task Force recommended calculation based on the Chronic Kidney Disease Epidemiology Collaboration (CKD-EPI) equation refit without adjustment for race.       Narrative:      GFR Normal >60  Chronic Kidney Disease <60  Kidney Failure <15      CBC Auto Differential [[id number]]  (Abnormal) Collected: 06/24/22 1510    Specimen: Blood from Arm, Right Updated: 06/24/22 1549     WBC 9.56 10*3/mm3      RBC 3.79 10*6/mm3      Hemoglobin 11.4 g/dL      Hematocrit 34.6 %      MCV 91.3 fL      MCH 30.1 pg      MCHC 32.9 g/dL      RDW 13.2 %      RDW-SD 44.5 fl      MPV 10.0 fL      Platelets 131 10*3/mm3      Neutrophil % 71.6 %      Lymphocyte % 21.7 %      Monocyte % 5.6 %      Eosinophil % 0.4 %      Basophil % 0.4 %      Immature Grans % 0.3 %      Neutrophils, Absolute 6.84 10*3/mm3      Lymphocytes, Absolute 2.07 10*3/mm3      Monocytes, Absolute 0.54 10*3/mm3      Eosinophils, Absolute 0.04 10*3/mm3      Basophils, Absolute 0.04 10*3/mm3      Immature Grans, Absolute 0.03 10*3/mm3      nRBC 0.0 /100 WBC     Urinalysis With Culture If Indicated - Urine, Clean Catch [[id number]]  (Abnormal) Collected: 06/24/22 1842    Specimen: Urine, Clean Catch Updated: 06/24/22 1856     Color, UA Yellow     Appearance, UA Clear     pH, UA 5.5     Specific Gravity, UA >=1.030     Glucose, UA >=1000 mg/dL (3+)     Ketones, UA 40 mg/dL (2+)     Bilirubin, UA Negative     Blood, UA Negative     Protein, UA Negative     Leuk Esterase, UA Negative     Nitrite, UA Negative      Urobilinogen, UA 0.2 E.U./dL    Narrative:      In absence of clinical symptoms, the presence of pyuria, bacteria, and/or nitrites on the urinalysis result does not correlate with infection.  Urine microscopic not indicated.    Urine Culture - Urine, Urine, Clean Catch [[id number]] Collected: 06/24/22 1842    Specimen: Urine, Clean Catch Updated: 06/24/22 1856           Imaging:  CT Abdomen Pelvis Without Contrast    Result Date: 6/24/2022  PROCEDURE: CT ABDOMEN PELVIS WO CONTRAST  COMPARISON: [facility], CT, CT ABDOMEN PELVIS W WO CONTRAST, 4/07/2022, 11:06.  [facility], CT, CT CHEST WO CONTRAST DIAGNOSTIC, 4/13/2022, 10:00.  INDICATIONS: LOWER ABDOMINAL PAIN,rectal bleeding,eval hernia's  TECHNIQUE: CT images were created without intravenous contrast.   PROTOCOL:   Standard imaging protocol performed    RADIATION:   DLP: 294mGy*cm   Automated exposure control was utilized to minimize radiation dose.  FINDINGS:  The patient has multiple ventral hernias.  All hernias are located along the midline.  There is an incisional hernia on image 83 of series 3 with the abdominal wall defect measuring 8.1 cm.  This contains several loops of small bowel with no evidence of ischemia or obstruction.  There is a wide-mouth ventral incisional hernia within the upper pelvis above the umbilicus.  The hernia defect measures 7.2 cm.  This hernia also contains several loops of small bowel with no evidence of ischemia or obstruction.  There is a trace amount of fluid also noted in the hernia sac.  Near the umbilicus within the mid pelvis, there is a 3rd incisional ventral hernia with the abdominal wall defect measuring 4.4 cm.  This hernia also contains a single loop of small bowel and a small amount of fluid with no evidence of ischemia or obstruction.  The patient has had a previous partial gastrectomy.  There is abnormal thickening of the wall of the cecum, ascending colon, and transverse colon  suspicious for a nonspecific colitis.  This was not apparent on the previous exam.  There is strandy density seen throughout the intra-abdominal fat.  There are several prominent mesenteric lymph nodes none of which are considered enlarged by size criteria.  This may be reactive secondary to the patient's colitis.  There is a small amount of free fluid in the lower pelvis.  Unenhanced images of the liver, spleen, adrenal glands, and pancreas are unremarkable.  There is a round fluid density mass extending off the upper pole of the right kidney containing some rim calcifications consistent with a complex cyst.  There is also a round hyperdense mass extending off the upper pole of the left kidney felt to represent a complex cyst.  The patient appears to have had a previous partial hysterectomy.  The urinary bladder is unremarkable.  There are extensive atherosclerotic vascular calcifications throughout the abdomen and pelvis.  There are no suspicious osteolytic or sclerotic lesions within the bony structures.  The lung bases are clear.  CONTINUED ON NEXT PAGE...        1. There are 3 incisional ventral hernias containing loops of small bowel.  Some of the hernias also contain fluid.  There is no evidence of ischemia or obstruction.  2. Abnormal thickening of the wall of the cecum, ascending colon, and transverse colon consistent with a nonspecific colitis. 3. Strandy density in the intra-abdominal fat with several prominent mesenteric lymph nodes not enlarged by size criteria.  This may be reactive secondary to the patient's colitis. 4. Small amount of free fluid in the lower pelvis which is nonspecific. 5. Complex renal cysts as described. 6. Additional findings as noted above.  The exam is limited by noncontrast technique.      KEVIN GARBER MD       Electronically Signed and Approved By: KEVIN GARBER MD on 6/24/2022 at 16:57               Procedures:  Procedures    Progress                            Medical Decision  Making:  MDM  Number of Diagnoses or Management Options  Diagnosis management comments:           The patient had no rectal bleeding while in the emergency room.  The patient's hemoglobin is stable at 11.4 and the hematocrit at 34.6.  The patient has essentially normal vital signs exclusion of uncontrolled blood pressure.  I reviewed the patient's CT scan.  This was done on an outpatient basis.  The patient has 3 ventral hernias but there is no signs of obstruction or strangulation/incarceration.  The patient had no clinical signs or symptoms consistent with incarceration.  Patient CT scan also demonstrated colitis which could possibly be the source of bleeding.  I discussed the antibiotic choice with the patient.  The patient states that she does not want to be placed on Cipro and Flagyl due to nausea in the past.  The patient states that she would prefer to be placed on Augmentin.  The patient will be placed on Augmentin, Zofran, Bentyl and hydrocodone.  The patient will be referred to the surgeon she has used in the past, Dr. Mendoza in regards to the ventral hernias as well as need for colonoscopy due to the rectal bleeding.    The patient is resting comfortably and feels better, is alert and in no distress.  Repeat examination is unremarkable and benign; in particular, there is no discomfort at McBurney's point and there is no pulsatile mass.  The history, exam, diagnostic testing and current condition does not suggest acute appendicitis, bowel obstruction, acute cholecystitis bowel perforation, major gastrointestinal bleeding, severe diverticulitis, abdominal aortic aneurysm, mesenteric ischemia, volvulus, sepsis or other significant pathology that warrants further testing, continued ED treatment, admission for surgical evaluation at this point.  The vital signs have been stable.  The patient does not have uncontrolled pain intractable vomiting or other significant symptoms.  The patient's condition is  stable and appropriate for discharge from the emergency department.  The patient will follow up with her primary care physician for serial reexamination of the abdomen tomorrow.  The patient was instructed to return should they have worsening pain, intractable vomiting, fever or new symptoms       Amount and/or Complexity of Data Reviewed  Clinical lab tests: reviewed  Tests in the radiology section of CPT®: reviewed  Tests in the medicine section of CPT®: reviewed  Decide to obtain previous medical records or to obtain history from someone other than the patient: yes (CT performed on an outpatient basis today    Encounter Date      6/24/22        CT Abdomen Pelvis Without Contrast ([id number]) (Order [id number])  Order  Status: Final result      Patient Location    Patient Class Location  Emergency [facility] EMERGENCY DEPT, 13, 13    [phone]  Appointment Information      Display Notes    V-AS          PACS Images     Radiology Images    Study Result    Narrative & Impression  PROCEDURE:  CT ABDOMEN PELVIS WO CONTRAST     COMPARISON: [facility], CT, CT ABDOMEN PELVIS W WO CONTRAST, 4/07/2022, 11:06.    [facility], CT, CT CHEST WO CONTRAST DIAGNOSTIC, 4/13/2022, 10:00.     INDICATIONS:  LOWER ABDOMINAL PAIN,rectal bleeding,eval hernia's     TECHNIQUE:    CT images were created without intravenous contrast.       PROTOCOL:     Standard imaging protocol performed                 RADIATION:      DLP: 294mGy*cm               Automated exposure control was utilized to minimize radiation dose.      FINDINGS:          The patient has multiple ventral hernias.  All hernias are located along the midline.  There is an   incisional hernia on image 83 of series 3 with the abdominal wall defect measuring 8.1 cm.  This   contains several loops of small bowel with no evidence of ischemia or obstruction.  There is a   wide-mouth ventral incisional hernia within the upper pelvis above the umbilicus.   The hernia   defect measures 7.2 cm.  This hernia also contains several loops of small bowel with no evidence of   ischemia or obstruction.  There is a trace amount of fluid also noted in the hernia sac.  Near the   umbilicus within the mid pelvis, there is a 3rd incisional ventral hernia with the abdominal wall   defect measuring 4.4 cm.  This hernia also contains a single loop of small bowel and a small amount   of fluid with no evidence of ischemia or obstruction.  The patient has had a previous partial   gastrectomy.  There is abnormal thickening of the wall of the cecum, ascending colon, and   transverse colon suspicious for a nonspecific colitis.  This was not apparent on the previous exam.    There is strandy density seen throughout the intra-abdominal fat.  There are several prominent   mesenteric lymph nodes none of which are considered enlarged by size criteria.  This may be   reactive secondary to the patient's colitis.  There is a small amount of free fluid in the lower   pelvis.  Unenhanced images of the liver, spleen, adrenal glands, and pancreas are unremarkable.    There is a round fluid density mass extending off the upper pole of the right kidney containing   some rim calcifications consistent with a complex cyst.  There is also a round hyperdense mass   extending off the upper pole of the left kidney felt to represent a complex cyst.  The patient   appears to have had a previous partial hysterectomy.  The urinary bladder is unremarkable.  There   are extensive atherosclerotic vascular calcifications throughout the abdomen and pelvis.  There are   no suspicious osteolytic or sclerotic lesions within the bony structures.  The lung bases are   clear.     CONTINUED ON NEXT PAGE...     IMPRESSION:                 1. There are 3 incisional ventral hernias containing loops of small bowel.  Some of the hernias   also contain fluid.  There is no evidence of ischemia or obstruction.    2. Abnormal  thickening of the wall of the cecum, ascending colon, and transverse colon consistent   with a nonspecific colitis.  3. Strandy density in the intra-abdominal fat with several prominent mesenteric lymph nodes not   enlarged by size criteria.  This may be reactive secondary to the patient's colitis.  4. Small amount of free fluid in the lower pelvis which is nonspecific.  5. Complex renal cysts as described.  6. Additional findings as noted above.  The exam is limited by noncontrast technique.              KEVIN GARBER MD         Electronically Signed and Approved By: KEVIN GARBER MD on 6/24/2022 at 16:57         )         Final diagnoses:   Rectal bleeding   Ventral hernia without obstruction or gangrene   Colitis   Uncontrolled hypertension        Disposition:  ED Disposition     ED Disposition   Discharge    Condition   Stable    Comment   --             This medical record created using voice recognition software.           Jeremias Jacinto  06/24/22 1845       Mak Nunez DO  06/25/22 8429

## 2022-06-25 LAB — BACTERIA SPEC AEROBE CULT: NO GROWTH

## 2022-06-25 NOTE — DISCHARGE INSTRUCTIONS
Please follow-up with Dr. Mendoza, your general surgeon.  Please review the CAT scan performed today.  Please discuss the ventral hernia seen on CT scan as well as the colitis.  Discuss possible need for colonoscopy due to the rectal bleeding.    Please check your blood pressure twice a day, record and discuss those readings with your primary care physician    Please follow-up with your doctor tomorrow for serial reexamination the abdomen.  Return to the emergency room immediately for intractable pain, intractable vomiting, fever, shaking chills, muscle aches, near passing out, passing out or any new symptoms you are concerned with

## 2022-06-27 ENCOUNTER — HOSPITAL ENCOUNTER (OUTPATIENT)
Dept: PET IMAGING | Facility: HOSPITAL | Age: 72
Discharge: HOME | End: 2022-06-27

## 2022-06-27 DIAGNOSIS — C25.0 MALIGNANT NEOPLASM OF HEAD OF PANCREAS: ICD-10-CM

## 2022-06-27 PROCEDURE — 0 FLUDEOXYGLUCOSE F18 SOLUTION: Performed by: INTERNAL MEDICINE

## 2022-06-27 PROCEDURE — A9552 F18 FDG: HCPCS | Performed by: INTERNAL MEDICINE

## 2022-06-27 PROCEDURE — 78815 PET IMAGE W/CT SKULL-THIGH: CPT

## 2022-06-27 RX ADMIN — FLUDEOXYGLUCOSE F18 1 DOSE: 300 INJECTION INTRAVENOUS at 12:04

## 2022-06-28 ENCOUNTER — OFFICE VISIT (OUTPATIENT)
Dept: ONCOLOGY | Facility: HOSPITAL | Age: 72
End: 2022-06-28

## 2022-06-28 ENCOUNTER — TELEPHONE (OUTPATIENT)
Dept: ONCOLOGY | Facility: OTHER | Age: 72
End: 2022-06-28

## 2022-06-28 ENCOUNTER — LAB (OUTPATIENT)
Dept: ONCOLOGY | Facility: HOSPITAL | Age: 72
End: 2022-06-28

## 2022-06-28 VITALS
WEIGHT: 145.94 LBS | RESPIRATION RATE: 18 BRPM | TEMPERATURE: 97.5 F | DIASTOLIC BLOOD PRESSURE: 73 MMHG | OXYGEN SATURATION: 100 % | SYSTOLIC BLOOD PRESSURE: 131 MMHG | HEART RATE: 86 BPM | BODY MASS INDEX: 25.05 KG/M2

## 2022-06-28 DIAGNOSIS — C25.0 MALIGNANT NEOPLASM OF HEAD OF PANCREAS: Primary | ICD-10-CM

## 2022-06-28 DIAGNOSIS — C25.0 MALIGNANT NEOPLASM OF HEAD OF PANCREAS: ICD-10-CM

## 2022-06-28 DIAGNOSIS — K57.92 DIVERTICULITIS: ICD-10-CM

## 2022-06-28 LAB
ALBUMIN SERPL-MCNC: 4.13 G/DL (ref 3.5–5.2)
ALBUMIN/GLOB SERPL: 1.5 G/DL
ALP SERPL-CCNC: 125 U/L (ref 39–117)
ALT SERPL W P-5'-P-CCNC: 28 U/L (ref 1–33)
ANION GAP SERPL CALCULATED.3IONS-SCNC: 13.1 MMOL/L (ref 5–15)
AST SERPL-CCNC: 35 U/L (ref 1–32)
BASOPHILS # BLD AUTO: 0.04 10*3/MM3 (ref 0–0.2)
BASOPHILS NFR BLD AUTO: 0.6 % (ref 0–1.5)
BILIRUB SERPL-MCNC: 0.5 MG/DL (ref 0–1.2)
BUN SERPL-MCNC: 8 MG/DL (ref 8–23)
BUN/CREAT SERPL: 11 (ref 7–25)
CALCIUM SPEC-SCNC: 9.3 MG/DL (ref 8.6–10.5)
CHLORIDE SERPL-SCNC: 102 MMOL/L (ref 98–107)
CO2 SERPL-SCNC: 23.9 MMOL/L (ref 22–29)
CREAT SERPL-MCNC: 0.73 MG/DL (ref 0.57–1)
DEPRECATED RDW RBC AUTO: 42.2 FL (ref 37–54)
EGFRCR SERPLBLD CKD-EPI 2021: 87 ML/MIN/1.73
EOSINOPHIL # BLD AUTO: 0.11 10*3/MM3 (ref 0–0.4)
EOSINOPHIL NFR BLD AUTO: 1.7 % (ref 0.3–6.2)
ERYTHROCYTE [DISTWIDTH] IN BLOOD BY AUTOMATED COUNT: 12.9 % (ref 12.3–15.4)
GLOBULIN UR ELPH-MCNC: 2.8 GM/DL
GLUCOSE SERPL-MCNC: 255 MG/DL (ref 65–99)
HCT VFR BLD AUTO: 35.4 % (ref 34–46.6)
HGB BLD-MCNC: 11.6 G/DL (ref 12–15.9)
IMM GRANULOCYTES # BLD AUTO: 0.01 10*3/MM3 (ref 0–0.05)
IMM GRANULOCYTES NFR BLD AUTO: 0.2 % (ref 0–0.5)
LYMPHOCYTES # BLD AUTO: 1.4 10*3/MM3 (ref 0.7–3.1)
LYMPHOCYTES NFR BLD AUTO: 22.1 % (ref 19.6–45.3)
MCH RBC QN AUTO: 29.3 PG (ref 26.6–33)
MCHC RBC AUTO-ENTMCNC: 32.8 G/DL (ref 31.5–35.7)
MCV RBC AUTO: 89.4 FL (ref 79–97)
MONOCYTES # BLD AUTO: 0.45 10*3/MM3 (ref 0.1–0.9)
MONOCYTES NFR BLD AUTO: 7.1 % (ref 5–12)
NEUTROPHILS NFR BLD AUTO: 4.33 10*3/MM3 (ref 1.7–7)
NEUTROPHILS NFR BLD AUTO: 68.3 % (ref 42.7–76)
PLATELET # BLD AUTO: 136 10*3/MM3 (ref 140–450)
PMV BLD AUTO: 10.1 FL (ref 6–12)
POTASSIUM SERPL-SCNC: 3.3 MMOL/L (ref 3.5–5.2)
PROT SERPL-MCNC: 6.9 G/DL (ref 6–8.5)
RBC # BLD AUTO: 3.96 10*6/MM3 (ref 3.77–5.28)
SODIUM SERPL-SCNC: 139 MMOL/L (ref 136–145)
WBC NRBC COR # BLD: 6.34 10*3/MM3 (ref 3.4–10.8)

## 2022-06-28 PROCEDURE — 99215 OFFICE O/P EST HI 40 MIN: CPT | Performed by: INTERNAL MEDICINE

## 2022-06-28 PROCEDURE — 36415 COLL VENOUS BLD VENIPUNCTURE: CPT

## 2022-06-28 PROCEDURE — 85025 COMPLETE CBC W/AUTO DIFF WBC: CPT

## 2022-06-28 PROCEDURE — 86301 IMMUNOASSAY TUMOR CA 19-9: CPT

## 2022-06-28 PROCEDURE — 80053 COMPREHEN METABOLIC PANEL: CPT

## 2022-06-28 PROCEDURE — G0463 HOSPITAL OUTPT CLINIC VISIT: HCPCS | Performed by: INTERNAL MEDICINE

## 2022-06-28 RX ORDER — OXYCODONE HYDROCHLORIDE 5 MG/1
5 TABLET ORAL EVERY 4 HOURS PRN
Qty: 60 TABLET | Refills: 0 | Status: ON HOLD | OUTPATIENT
Start: 2022-06-28 | End: 2022-09-20

## 2022-06-28 RX ORDER — NITROFURANTOIN 25; 75 MG/1; MG/1
CAPSULE ORAL
Status: ON HOLD | COMMUNITY
Start: 2022-06-15 | End: 2022-09-20

## 2022-06-28 RX ORDER — FLUCONAZOLE 150 MG/1
150 TABLET ORAL
COMMUNITY
Start: 2022-06-15 | End: 2022-08-19 | Stop reason: SDUPTHER

## 2022-06-28 RX ORDER — METRONIDAZOLE 250 MG/1
500 TABLET ORAL 3 TIMES DAILY
Qty: 15 TABLET | Refills: 0 | Status: SHIPPED | OUTPATIENT
Start: 2022-06-28 | End: 2022-07-03

## 2022-06-28 NOTE — TELEPHONE ENCOUNTER
YANN WITH [facility] CALLING FOR DOSE CLARIFICATION ON PTS METRONIDAZOLE SCRIPT, REQ TO SPEAK WITH NURSE FOR CLARIFICATION NOW. W/T TO MICHAEL TO FURTHER ASSIST.

## 2022-06-29 LAB — CANCER AG19-9 SERPL-ACNC: 152 U/ML

## 2022-07-05 PROBLEM — K57.92 DIVERTICULITIS: Status: ACTIVE | Noted: 2022-07-05

## 2022-07-06 DIAGNOSIS — C25.0 MALIGNANT NEOPLASM OF HEAD OF PANCREAS: Primary | ICD-10-CM

## 2022-07-10 ENCOUNTER — HOSPITAL ENCOUNTER (EMERGENCY)
Facility: HOSPITAL | Age: 72
Discharge: HOME | End: 2022-07-10
Attending: EMERGENCY MEDICINE | Admitting: EMERGENCY MEDICINE

## 2022-07-10 ENCOUNTER — APPOINTMENT (OUTPATIENT)
Dept: CT IMAGING | Facility: HOSPITAL | Age: 72
End: 2022-07-10

## 2022-07-10 VITALS
HEIGHT: 64 IN | OXYGEN SATURATION: 99 % | WEIGHT: 140 LBS | DIASTOLIC BLOOD PRESSURE: 90 MMHG | TEMPERATURE: 97 F | SYSTOLIC BLOOD PRESSURE: 170 MMHG | HEART RATE: 71 BPM | RESPIRATION RATE: 18 BRPM | BODY MASS INDEX: 23.9 KG/M2

## 2022-07-10 VITALS
TEMPERATURE: 98.6 F | BODY MASS INDEX: 24.01 KG/M2 | WEIGHT: 140.65 LBS | DIASTOLIC BLOOD PRESSURE: 74 MMHG | OXYGEN SATURATION: 97 % | HEIGHT: 64 IN | HEART RATE: 74 BPM | RESPIRATION RATE: 16 BRPM | SYSTOLIC BLOOD PRESSURE: 154 MMHG

## 2022-07-10 DIAGNOSIS — I81 PORTAL VEIN THROMBOSIS: Primary | ICD-10-CM

## 2022-07-10 DIAGNOSIS — Z85.07 HISTORY OF PANCREATIC CANCER: ICD-10-CM

## 2022-07-10 DIAGNOSIS — Z90.49 H/O WHIPPLE PROCEDURE: ICD-10-CM

## 2022-07-10 DIAGNOSIS — R11.0 NAUSEA: Primary | ICD-10-CM

## 2022-07-10 DIAGNOSIS — K55.069 SUPERIOR MESENTERIC VEIN THROMBOSIS: ICD-10-CM

## 2022-07-10 DIAGNOSIS — Z90.410 H/O WHIPPLE PROCEDURE: ICD-10-CM

## 2022-07-10 DIAGNOSIS — R10.84 GENERALIZED ABDOMINAL PAIN: ICD-10-CM

## 2022-07-10 LAB
ALBUMIN SERPL-MCNC: 4.2 G/DL (ref 3.5–5.2)
ALBUMIN SERPL-MCNC: 4.2 G/DL (ref 3.5–5.2)
ALBUMIN/GLOB SERPL: 1.4 G/DL
ALBUMIN/GLOB SERPL: 1.6 G/DL
ALP SERPL-CCNC: 120 U/L (ref 39–117)
ALP SERPL-CCNC: 124 U/L (ref 39–117)
ALT SERPL W P-5'-P-CCNC: 29 U/L (ref 1–33)
ALT SERPL W P-5'-P-CCNC: 30 U/L (ref 1–33)
ANION GAP SERPL CALCULATED.3IONS-SCNC: 13.4 MMOL/L (ref 5–15)
ANION GAP SERPL CALCULATED.3IONS-SCNC: 14 MMOL/L (ref 5–15)
AST SERPL-CCNC: 34 U/L (ref 1–32)
AST SERPL-CCNC: 45 U/L (ref 1–32)
BASOPHILS # BLD AUTO: 0.04 10*3/MM3 (ref 0–0.2)
BASOPHILS # BLD AUTO: 0.05 10*3/MM3 (ref 0–0.2)
BASOPHILS NFR BLD AUTO: 0.7 % (ref 0–1.5)
BASOPHILS NFR BLD AUTO: 0.9 % (ref 0–1.5)
BILIRUB SERPL-MCNC: 0.6 MG/DL (ref 0–1.2)
BILIRUB SERPL-MCNC: 0.6 MG/DL (ref 0–1.2)
BILIRUB UR QL STRIP: NEGATIVE
BILIRUB UR QL STRIP: NEGATIVE
BUN SERPL-MCNC: 13 MG/DL (ref 8–23)
BUN SERPL-MCNC: 15 MG/DL (ref 8–23)
BUN/CREAT SERPL: 18.6 (ref 7–25)
BUN/CREAT SERPL: 19.2 (ref 7–25)
CALCIUM SPEC-SCNC: 9.2 MG/DL (ref 8.6–10.5)
CALCIUM SPEC-SCNC: 9.4 MG/DL (ref 8.6–10.5)
CHLORIDE SERPL-SCNC: 101 MMOL/L (ref 98–107)
CHLORIDE SERPL-SCNC: 103 MMOL/L (ref 98–107)
CLARITY UR: ABNORMAL
CLARITY UR: CLEAR
CO2 SERPL-SCNC: 19.6 MMOL/L (ref 22–29)
CO2 SERPL-SCNC: 21 MMOL/L (ref 22–29)
COLOR UR: YELLOW
COLOR UR: YELLOW
CREAT SERPL-MCNC: 0.7 MG/DL (ref 0.57–1)
CREAT SERPL-MCNC: 0.78 MG/DL (ref 0.57–1)
D-LACTATE SERPL-SCNC: 1.6 MMOL/L (ref 0.5–2)
D-LACTATE SERPL-SCNC: 1.6 MMOL/L (ref 0.5–2)
DEPRECATED RDW RBC AUTO: 41.5 FL (ref 37–54)
DEPRECATED RDW RBC AUTO: 43.4 FL (ref 37–54)
EGFRCR SERPLBLD CKD-EPI 2021: 80.3 ML/MIN/1.73
EGFRCR SERPLBLD CKD-EPI 2021: 91.5 ML/MIN/1.73
EOSINOPHIL # BLD AUTO: 0.07 10*3/MM3 (ref 0–0.4)
EOSINOPHIL # BLD AUTO: 0.11 10*3/MM3 (ref 0–0.4)
EOSINOPHIL NFR BLD AUTO: 1.2 % (ref 0.3–6.2)
EOSINOPHIL NFR BLD AUTO: 1.9 % (ref 0.3–6.2)
ERYTHROCYTE [DISTWIDTH] IN BLOOD BY AUTOMATED COUNT: 12.5 % (ref 12.3–15.4)
ERYTHROCYTE [DISTWIDTH] IN BLOOD BY AUTOMATED COUNT: 13.2 % (ref 12.3–15.4)
GLOBULIN UR ELPH-MCNC: 2.7 GM/DL
GLOBULIN UR ELPH-MCNC: 3 GM/DL
GLUCOSE SERPL-MCNC: 223 MG/DL (ref 65–99)
GLUCOSE SERPL-MCNC: 329 MG/DL (ref 65–99)
GLUCOSE UR STRIP-MCNC: ABNORMAL MG/DL
GLUCOSE UR STRIP-MCNC: ABNORMAL MG/DL
HCT VFR BLD AUTO: 34.3 % (ref 34–46.6)
HCT VFR BLD AUTO: 34.3 % (ref 34–46.6)
HGB BLD-MCNC: 11.2 G/DL (ref 12–15.9)
HGB BLD-MCNC: 11.4 G/DL (ref 12–15.9)
HGB UR QL STRIP.AUTO: NEGATIVE
HGB UR QL STRIP.AUTO: NEGATIVE
HOLD SPECIMEN: NORMAL
IMM GRANULOCYTES # BLD AUTO: 0.02 10*3/MM3 (ref 0–0.05)
IMM GRANULOCYTES # BLD AUTO: 0.03 10*3/MM3 (ref 0–0.05)
IMM GRANULOCYTES NFR BLD AUTO: 0.3 % (ref 0–0.5)
IMM GRANULOCYTES NFR BLD AUTO: 0.5 % (ref 0–0.5)
KETONES UR QL STRIP: ABNORMAL
KETONES UR QL STRIP: ABNORMAL
LEUKOCYTE ESTERASE UR QL STRIP.AUTO: NEGATIVE
LEUKOCYTE ESTERASE UR QL STRIP.AUTO: NEGATIVE
LIPASE SERPL-CCNC: 4 U/L (ref 13–60)
LIPASE SERPL-CCNC: 5 U/L (ref 13–60)
LYMPHOCYTES # BLD AUTO: 1.13 10*3/MM3 (ref 0.7–3.1)
LYMPHOCYTES # BLD AUTO: 1.44 10*3/MM3 (ref 0.7–3.1)
LYMPHOCYTES NFR BLD AUTO: 19.3 % (ref 19.6–45.3)
LYMPHOCYTES NFR BLD AUTO: 25 % (ref 19.6–45.3)
MCH RBC QN AUTO: 29.9 PG (ref 26.6–33)
MCH RBC QN AUTO: 30.1 PG (ref 26.6–33)
MCHC RBC AUTO-ENTMCNC: 32.7 G/DL (ref 31.5–35.7)
MCHC RBC AUTO-ENTMCNC: 33.2 G/DL (ref 31.5–35.7)
MCV RBC AUTO: 90.5 FL (ref 79–97)
MCV RBC AUTO: 91.7 FL (ref 79–97)
MONOCYTES # BLD AUTO: 0.46 10*3/MM3 (ref 0.1–0.9)
MONOCYTES # BLD AUTO: 0.53 10*3/MM3 (ref 0.1–0.9)
MONOCYTES NFR BLD AUTO: 7.9 % (ref 5–12)
MONOCYTES NFR BLD AUTO: 9.2 % (ref 5–12)
NEUTROPHILS NFR BLD AUTO: 3.59 10*3/MM3 (ref 1.7–7)
NEUTROPHILS NFR BLD AUTO: 4.13 10*3/MM3 (ref 1.7–7)
NEUTROPHILS NFR BLD AUTO: 62.5 % (ref 42.7–76)
NEUTROPHILS NFR BLD AUTO: 70.6 % (ref 42.7–76)
NITRITE UR QL STRIP: NEGATIVE
NITRITE UR QL STRIP: NEGATIVE
NRBC BLD AUTO-RTO: 0 /100 WBC (ref 0–0.2)
NRBC BLD AUTO-RTO: 0 /100 WBC (ref 0–0.2)
PH UR STRIP.AUTO: 5.5 [PH] (ref 5–8)
PH UR STRIP.AUTO: <=5 [PH] (ref 5–8)
PLATELET # BLD AUTO: 120 10*3/MM3 (ref 140–450)
PLATELET # BLD AUTO: 124 10*3/MM3 (ref 140–450)
PMV BLD AUTO: 10 FL (ref 6–12)
PMV BLD AUTO: 9.7 FL (ref 6–12)
POTASSIUM SERPL-SCNC: 3.6 MMOL/L (ref 3.5–5.2)
POTASSIUM SERPL-SCNC: 3.9 MMOL/L (ref 3.5–5.2)
PROT SERPL-MCNC: 6.9 G/DL (ref 6–8.5)
PROT SERPL-MCNC: 7.2 G/DL (ref 6–8.5)
PROT UR QL STRIP: NEGATIVE
PROT UR QL STRIP: NEGATIVE
RBC # BLD AUTO: 3.74 10*6/MM3 (ref 3.77–5.28)
RBC # BLD AUTO: 3.79 10*6/MM3 (ref 3.77–5.28)
SODIUM SERPL-SCNC: 134 MMOL/L (ref 136–145)
SODIUM SERPL-SCNC: 138 MMOL/L (ref 136–145)
SP GR UR STRIP: >1.03 (ref 1–1.03)
SP GR UR STRIP: >=1.03 (ref 1–1.03)
UROBILINOGEN UR QL STRIP: ABNORMAL
UROBILINOGEN UR QL STRIP: ABNORMAL
WBC NRBC COR # BLD: 5.75 10*3/MM3 (ref 3.4–10.8)
WBC NRBC COR # BLD: 5.85 10*3/MM3 (ref 3.4–10.8)
WHOLE BLOOD HOLD COAG: NORMAL
WHOLE BLOOD HOLD COAG: NORMAL
WHOLE BLOOD HOLD SPECIMEN: NORMAL
WHOLE BLOOD HOLD SPECIMEN: NORMAL

## 2022-07-10 PROCEDURE — 25010000002 ONDANSETRON PER 1 MG: Performed by: EMERGENCY MEDICINE

## 2022-07-10 PROCEDURE — 83690 ASSAY OF LIPASE: CPT

## 2022-07-10 PROCEDURE — 74177 CT ABD & PELVIS W/CONTRAST: CPT

## 2022-07-10 PROCEDURE — 99283 EMERGENCY DEPT VISIT LOW MDM: CPT

## 2022-07-10 PROCEDURE — 80053 COMPREHEN METABOLIC PANEL: CPT | Performed by: EMERGENCY MEDICINE

## 2022-07-10 PROCEDURE — 96374 THER/PROPH/DIAG INJ IV PUSH: CPT

## 2022-07-10 PROCEDURE — 36415 COLL VENOUS BLD VENIPUNCTURE: CPT

## 2022-07-10 PROCEDURE — 83605 ASSAY OF LACTIC ACID: CPT | Performed by: EMERGENCY MEDICINE

## 2022-07-10 PROCEDURE — 81003 URINALYSIS AUTO W/O SCOPE: CPT | Performed by: EMERGENCY MEDICINE

## 2022-07-10 PROCEDURE — 25010000002 IOPAMIDOL 61 % SOLUTION: Performed by: EMERGENCY MEDICINE

## 2022-07-10 PROCEDURE — 96375 TX/PRO/DX INJ NEW DRUG ADDON: CPT

## 2022-07-10 PROCEDURE — 81003 URINALYSIS AUTO W/O SCOPE: CPT

## 2022-07-10 PROCEDURE — 83605 ASSAY OF LACTIC ACID: CPT

## 2022-07-10 PROCEDURE — 85025 COMPLETE CBC W/AUTO DIFF WBC: CPT | Performed by: EMERGENCY MEDICINE

## 2022-07-10 PROCEDURE — 80053 COMPREHEN METABOLIC PANEL: CPT

## 2022-07-10 PROCEDURE — 85025 COMPLETE CBC W/AUTO DIFF WBC: CPT

## 2022-07-10 PROCEDURE — 25010000002 HYDROMORPHONE PER 4 MG: Performed by: EMERGENCY MEDICINE

## 2022-07-10 PROCEDURE — 83690 ASSAY OF LIPASE: CPT | Performed by: EMERGENCY MEDICINE

## 2022-07-10 RX ORDER — ONDANSETRON 2 MG/ML
4 INJECTION INTRAMUSCULAR; INTRAVENOUS ONCE
Status: COMPLETED | OUTPATIENT
Start: 2022-07-10 | End: 2022-07-10

## 2022-07-10 RX ORDER — FAMOTIDINE 20 MG/1
20 TABLET, FILM COATED ORAL 2 TIMES DAILY
Qty: 30 TABLET | Refills: 0 | Status: SHIPPED | OUTPATIENT
Start: 2022-07-10 | End: 2022-09-19

## 2022-07-10 RX ORDER — SODIUM CHLORIDE 0.9 % (FLUSH) 0.9 %
10 SYRINGE (ML) INJECTION AS NEEDED
Status: DISCONTINUED | OUTPATIENT
Start: 2022-07-10 | End: 2022-07-10 | Stop reason: HOSPADM

## 2022-07-10 RX ORDER — ONDANSETRON 4 MG/1
4 TABLET, ORALLY DISINTEGRATING ORAL EVERY 8 HOURS PRN
Qty: 15 TABLET | Refills: 0 | Status: SHIPPED | OUTPATIENT
Start: 2022-07-10 | End: 2022-08-19 | Stop reason: SDUPTHER

## 2022-07-10 RX ORDER — HYDROMORPHONE HYDROCHLORIDE 1 MG/ML
0.5 INJECTION, SOLUTION INTRAMUSCULAR; INTRAVENOUS; SUBCUTANEOUS ONCE
Status: COMPLETED | OUTPATIENT
Start: 2022-07-10 | End: 2022-07-10

## 2022-07-10 RX ORDER — FAMOTIDINE 10 MG/ML
20 INJECTION, SOLUTION INTRAVENOUS ONCE
Status: COMPLETED | OUTPATIENT
Start: 2022-07-10 | End: 2022-07-10

## 2022-07-10 RX ADMIN — ONDANSETRON 4 MG: 2 INJECTION INTRAMUSCULAR; INTRAVENOUS at 10:17

## 2022-07-10 RX ADMIN — SODIUM CHLORIDE 1000 ML: 9 INJECTION, SOLUTION INTRAVENOUS at 10:13

## 2022-07-10 RX ADMIN — ONDANSETRON 4 MG: 2 INJECTION INTRAMUSCULAR; INTRAVENOUS at 19:44

## 2022-07-10 RX ADMIN — HYDROMORPHONE HYDROCHLORIDE 0.5 MG: 1 INJECTION, SOLUTION INTRAMUSCULAR; INTRAVENOUS; SUBCUTANEOUS at 19:44

## 2022-07-10 RX ADMIN — IOPAMIDOL 85 ML: 612 INJECTION, SOLUTION INTRAVENOUS at 19:03

## 2022-07-10 RX ADMIN — FAMOTIDINE 20 MG: 10 INJECTION INTRAVENOUS at 10:13

## 2022-07-10 NOTE — ED PROVIDER NOTES
Time: 8:02 AM EDT  Arrived by: private car  Chief Complaint: Abdominal pain  History provided by: Patient  History is limited by: N/A     History of Present Illness:  Patient is a 72 y.o. female that presents to the emergency department with abdominal pain for the past week. The pt has a hx of colitis and her pain is related to her colitis. Endorses mild nausea, but denies vomiting and diarrhea. No CP. She reports mild decreased appetite, but she can eat and drink. No dysuria or increased frequency, however the pt has been drinking more fluids than usual. The pt's symptoms are moderate in severity and have no other reported modifying factors.       History provided by:  Patient   used: No    Abdominal Pain  Pain location:  Generalized  Pain radiates to:  Does not radiate  Pain severity:  Moderate  Onset quality:  Unable to specify  Duration:  1 week  Timing:  Constant  Progression:  Worsening  Chronicity:  Recurrent  Context comment:  Hx of colitis  Relieved by:  Nothing  Worsened by:  Nothing  Ineffective treatments:  None tried  Associated symptoms: nausea    Associated symptoms: no chest pain, no chills, no cough, no diarrhea, no dysuria, no fever, no shortness of breath, no sore throat and no vomiting    Risk factors: being elderly and multiple surgeries    Risk factors: not obese and not pregnant        Similar Symptoms Previously: yes  Recently seen: Seen in ED on 6/24/22      Patient Care Team  Primary Care Provider: Brock Quach PA    Past Medical History:     Allergies   Allergen Reactions   • Adhesive Tape Hives and Itching     Blisters   • Latex Other (See Comments)     Blistering of skin     Past Medical History:   Diagnosis Date   • Diabetes (HCC)    • Essential hypertension    • Gallstones    • Heartburn    • History of pancreatic cancer    • No family history of colorectal cancer    • Pancreatic cancer (HCC)    • Sleep apnea      Past Surgical History:   Procedure  Laterality Date   • APPENDECTOMY     • COLONOSCOPY     • ERCP     • FOOT SURGERY     • GALLBLADDER SURGERY     • HERNIA REPAIR     • OOPHORECTOMY     • UPPER GASTROINTESTINAL ENDOSCOPY     • WHIPPLE PROCEDURE       Family History   Problem Relation Age of Onset   • Heart attack Mother    • Diabetes Mother    • Heart disease Mother    • Heart attack Father    • Colon cancer Neg Hx        Home Medications:  Prior to Admission medications    Medication Sig Start Date End Date Taking? Authorizing Provider   atorvastatin (LIPITOR) 20 MG tablet Take 20 mg by mouth Every Night. 4/14/21   Ganesh Crum MD   busPIRone (BUSPAR) 10 MG tablet Take 10 mg by mouth 2 (Two) Times a Day. 5/19/21   Ganesh Crum MD   carvedilol (COREG) 6.25 MG tablet carvedilol 6.25 mg oral tablet take 1 tablet (6.25 mg) by oral route 2 times per day with food   Suspended    Ganesh Crum MD Creon 6000-16180 units capsule delayed-release particles capsule Take 18,000 units of lipase by mouth 3 (Three) Times a Day. 5/26/21   Ganesh Crum MD   Dulaglutide (Trulicity) 0.75 MG/0.5ML solution pen-injector     Ganesh Crum MD   Farxiga 5 MG tablet tablet Take 5 mg by mouth Daily. 2/23/22   Ganesh Crum MD   fluconazole (DIFLUCAN) 150 MG tablet TAKE 1 TABLET BY MOUTH AS A ONE TIME DOSE, REPEAT IN 3 DAYS 12/30/21   Ganesh Crum MD   fluconazole (DIFLUCAN) 150 MG tablet Take 150 mg by mouth. 6/15/22   Ganesh Crum MD   fluticasone (FLONASE) 50 MCG/ACT nasal spray  5/12/21   Ganesh Crum MD   fluticasone (FLONASE) 50 MCG/ACT nasal spray 2 sprays into the nostril(s) as directed by provider Daily. 4/1/22   Ganesh Crum MD   insulin aspart (novoLOG) 100 UNIT/ML injection Inject 5-10 Units under the skin into the appropriate area as directed 3 (Three) Times a Day. 11/9/21   Ganesh Crum MD   Insulin Glargine (BASAGLAR KWIKPEN) 100 UNIT/ML injection pen INJECT 15 TO 25  UNITS SUBCUTANEOUSLY NIGHTLY USING SLIDING SCALE 2/10/22   Ganesh Crum MD   Levemir 100 UNIT/ML injection INJECT 10 UNITS SUBCUTANEOUSLY ONCE DAILY 4/21/21   Ganesh Crum MD   lisinopril-hydrochlorothiazide (PRINZIDE,ZESTORETIC) 20-12.5 MG per tablet Take 1 tablet by mouth Daily. 12/31/21   Ganesh Crum MD   loratadine (CLARITIN) 10 MG tablet Take 10 mg by mouth Daily. 5/22/21   Ganesh Crum MD   Mag64 64 MG DR tablet Take  by mouth Daily. 6/9/21   Ganesh Crum MD   Magnesium 65 MG tablet Take  by mouth.    Ganesh Crum MD   metFORMIN (GLUCOPHAGE) 1000 MG tablet  5/14/21   Ganesh Crum MD   montelukast (SINGULAIR) 10 MG tablet Take 10 mg by mouth Daily. 2/5/22   Ganesh Crum MD   nitrofurantoin, macrocrystal-monohydrate, (MACROBID) 100 MG capsule  6/15/22   Ganesh Crum MD   omeprazole (priLOSEC) 40 MG capsule  3/22/21   Ganesh Crum MD   omeprazole (priLOSEC) 40 MG capsule Take 40 mg by mouth Daily. 4/1/22   Ganesh Crum MD   OneTouch Ultra test strip USE 1 STRIP TO CHECK GLUCOSE ONCE DAILY AS DIRECTED 2/5/22   Ganesh Crum MD   oxyCODONE (Roxicodone) 5 MG immediate release tablet Take 1 tablet by mouth Every 4 (Four) Hours As Needed for Moderate Pain . 6/28/22   Eboni Acharya MD PhD   prazosin (MINIPRESS) 2 MG capsule Take 2 mg by mouth Every Night. 5/27/21   Ganesh Crum MD   sertraline (ZOLOFT) 100 MG tablet  5/22/21   Ganesh Crum MD   sertraline (ZOLOFT) 100 MG tablet Take 100 mg by mouth Daily. 4/1/22 9/28/22  Ganesh Crum MD   sodium chloride 1 g tablet Take 1 g by mouth 3 (Three) Times a Day.    Ganesh Crum MD   traZODone (DESYREL) 100 MG tablet  5/12/21   Ganesh Crum MD   Turmeric 400 MG capsule turmeric 400 mg oral capsule take 1 capsule by oral route daily   Active    Ganesh Crum MD        Social History:   Social History     Tobacco  "Use   • Smoking status: Never Smoker   • Smokeless tobacco: Never Used   Vaping Use   • Vaping Use: Never used   Substance Use Topics   • Alcohol use: Never   • Drug use: Never     Recent travel: no     Review of Systems:  Review of Systems   Constitutional: Positive for appetite change (mild decrease). Negative for chills and fever.   HENT: Negative for congestion, rhinorrhea and sore throat.    Eyes: Negative for pain and visual disturbance.   Respiratory: Negative for apnea, cough, chest tightness and shortness of breath.    Cardiovascular: Negative for chest pain and palpitations.   Gastrointestinal: Positive for abdominal pain and nausea. Negative for diarrhea and vomiting.   Genitourinary: Negative for difficulty urinating, dysuria and frequency.   Musculoskeletal: Negative for joint swelling and myalgias.   Skin: Negative for color change.   Neurological: Negative for seizures and headaches.   Psychiatric/Behavioral: Negative.    All other systems reviewed and are negative.       Physical Exam:  /74   Pulse 74   Temp 98.6 °F (37 °C) (Oral)   Resp 16   Ht 162.6 cm (64\")   Wt 63.8 kg (140 lb 10.5 oz)   SpO2 97%   BMI 24.14 kg/m²     Physical Exam  Vitals and nursing note reviewed.   Constitutional:       General: She is not in acute distress.     Appearance: Normal appearance. She is not toxic-appearing.   HENT:      Head: Normocephalic and atraumatic.      Jaw: There is normal jaw occlusion.   Eyes:      General: Lids are normal.      Extraocular Movements: Extraocular movements intact.      Conjunctiva/sclera: Conjunctivae normal.      Pupils: Pupils are equal, round, and reactive to light.   Cardiovascular:      Rate and Rhythm: Normal rate and regular rhythm.      Pulses: Normal pulses.      Heart sounds: Normal heart sounds.   Pulmonary:      Effort: Pulmonary effort is normal. No respiratory distress.      Breath sounds: Normal breath sounds. No wheezing or rhonchi.   Abdominal:      General: " Abdomen is flat.      Palpations: Abdomen is soft.      Tenderness: There is no abdominal tenderness. There is no guarding or rebound.   Musculoskeletal:         General: Normal range of motion.      Cervical back: Normal range of motion and neck supple.      Right lower leg: No edema.      Left lower leg: No edema.   Skin:     General: Skin is warm and dry.   Neurological:      Mental Status: She is alert and oriented to person, place, and time. Mental status is at baseline.   Psychiatric:         Mood and Affect: Mood normal.                Medications in the Emergency Department:  Medications   sodium chloride 0.9 % flush 10 mL (has no administration in time range)   ondansetron (ZOFRAN) injection 4 mg (4 mg Intravenous Given 7/10/22 1017)   famotidine (PEPCID) injection 20 mg (20 mg Intravenous Given 7/10/22 1013)   sodium chloride 0.9 % bolus 1,000 mL (1,000 mL Intravenous New Bag 7/10/22 1013)        Labs  Lab Results (last 24 hours)     Procedure Component Value Units Date/Time    CBC & Differential [[id number]]  (Abnormal) Collected: 07/10/22 0805    Specimen: Blood Updated: 07/10/22 0815    Narrative:      The following orders were created for panel order CBC & Differential.  Procedure                               Abnormality         Status                     ---------                               -----------         ------                     CBC Auto Differential[[id number]]        Abnormal            Final result                 Please view results for these tests on the individual orders.    Comprehensive Metabolic Panel [[id number]]  (Abnormal) Collected: 07/10/22 0805    Specimen: Blood Updated: 07/10/22 0849     Glucose 329 mg/dL      BUN 15 mg/dL      Creatinine 0.78 mg/dL      Sodium 134 mmol/L      Potassium 3.9 mmol/L      Comment: Slight hemolysis detected by analyzer. Results may be affected.        Chloride 101 mmol/L      CO2 19.6 mmol/L      Calcium 9.4 mg/dL      Total Protein 7.2 g/dL       Albumin 4.20 g/dL      ALT (SGPT) 29 U/L      AST (SGOT) 45 U/L      Comment: Slight hemolysis detected by analyzer. Results may be affected.        Alkaline Phosphatase 124 U/L      Total Bilirubin 0.6 mg/dL      Globulin 3.0 gm/dL      A/G Ratio 1.4 g/dL      BUN/Creatinine Ratio 19.2     Anion Gap 13.4 mmol/L      eGFR 80.3 mL/min/1.73      Comment: National Kidney Foundation and American Society of Nephrology (ASN) Task Force recommended calculation based on the Chronic Kidney Disease Epidemiology Collaboration (CKD-EPI) equation refit without adjustment for race.       Narrative:      GFR Normal >60  Chronic Kidney Disease <60  Kidney Failure <15      Lipase [[id number]]  (Abnormal) Collected: 07/10/22 0805    Specimen: Blood Updated: 07/10/22 0838     Lipase 5 U/L     Lactic Acid, Plasma [[id number]]  (Normal) Collected: 07/10/22 0805    Specimen: Blood Updated: 07/10/22 0833     Lactate 1.6 mmol/L     CBC Auto Differential [[id number]]  (Abnormal) Collected: 07/10/22 0805    Specimen: Blood Updated: 07/10/22 0815     WBC 5.85 10*3/mm3      RBC 3.79 10*6/mm3      Hemoglobin 11.4 g/dL      Hematocrit 34.3 %      MCV 90.5 fL      MCH 30.1 pg      MCHC 33.2 g/dL      RDW 13.2 %      RDW-SD 43.4 fl      MPV 10.0 fL      Platelets 124 10*3/mm3      Neutrophil % 70.6 %      Lymphocyte % 19.3 %      Monocyte % 7.9 %      Eosinophil % 1.2 %      Basophil % 0.7 %      Immature Grans % 0.3 %      Neutrophils, Absolute 4.13 10*3/mm3      Lymphocytes, Absolute 1.13 10*3/mm3      Monocytes, Absolute 0.46 10*3/mm3      Eosinophils, Absolute 0.07 10*3/mm3      Basophils, Absolute 0.04 10*3/mm3      Immature Grans, Absolute 0.02 10*3/mm3      nRBC 0.0 /100 WBC     Urinalysis With Microscopic If Indicated (No Culture) - Urine, Clean Catch [[id number]]  (Abnormal) Collected: 07/10/22 0905    Specimen: Urine, Clean Catch Updated: 07/10/22 0917     Color, UA Yellow     Appearance, UA Clear     pH, UA 5.5     Specific Gravity, UA  >1.030     Glucose, UA >=1000 mg/dL (3+)     Ketones, UA 15 mg/dL (1+)     Bilirubin, UA Negative     Blood, UA Negative     Protein, UA Negative     Leuk Esterase, UA Negative     Nitrite, UA Negative     Urobilinogen, UA 0.2 E.U./dL    Narrative:      Urine microscopic not indicated.           Imaging:  No Radiology Exams Resulted Within Past 24 Hours    Procedures:  Procedures    Progress  ED Course as of 07/10/22 1136   Sun Jul 10, 2022   1118 Pt rechecked. I discussed all pertinent lab and imaging findings at this time with plan for discharge. Pt is agreeable to plan. All questions answered.    [SB]      ED Course User Index  [SB] Rob Levin                            Medical Decision Making:  MDM  Number of Diagnoses or Management Options  Nausea  Diagnosis management comments: The patient´s CBC was reviewed and shows no abnormalities of critical concern. Of note, there is no anemia requiring a blood transfusion and the platelet count is acceptable.  The patient´s CMP was reviewed and shows no abnormalities of critical concern. Of note, the patient´s sodium and potassium are acceptable. The patient´s liver enzymes are unremarkable. The patient´s renal function (creatinine) is preserved. The patient has a normal anion gap.  Urinalysis is negative for hematuria, ketonuria and bacteriuria.  Lactic acid is 1.6.  The patient comes to the ED for evaluation of vomiting. Emesis is much improved in the ED. The patient was given antiemetics in the ED. The patient is resting comfortably and feels better, is alert and in no distress. Repeat examination is unremarkable and benign; in particular, there's no discomfort at McBurney's point. The history, exam, diagnostic testing, and current condition does not suggest acute appendicitis, bowel instruction, acute cholecystitis, bowel perforation, major gastrointestinal bleeding, severe diverticulitis, abdominal aortic aneurysm, mesenteric ischemia, volvulus, sepsis,  or other significant pathology that warrants further testing, continued ED treatment, admission, for surgical evaluation at this point. The vital signs have been stable. Bloodwork performed shows no signs of acute renal failure. The patient does not have uncontrollable pain, intractable vomiting, or other significant symptoms. The patient is now able to tolerate po intake in the ED and has passed a po challenge. The patient's condition is stable and appropriate for discharge from the emergency department.       Amount and/or Complexity of Data Reviewed  Clinical lab tests: reviewed  Independent visualization of images, tracings, or specimens: yes    Risk of Complications, Morbidity, and/or Mortality  Presenting problems: moderate  Management options: moderate    Patient Progress  Patient progress: stable       Final diagnoses:   Nausea        Disposition:  ED Disposition     ED Disposition   Discharge    Condition   Stable    Comment   --             Documentation assistance provided by Rob Levin acting as scribe for Kiki Barba MD  . Information recorded by the scribe was done at my direction and has been verified and validated by me.        Rob Levin  07/10/22 0922       Kiki Barba MD  07/10/22 2938

## 2022-07-10 NOTE — ED PROVIDER NOTES
EMERGENCY DEPARTMENT ENCOUNTER    Room Number:  16/16  Date of encounter:  7/18/2022  PCP: Brock Quach PA  Historian: Patient and family      HPI:  Chief Complaint: Abdominal pain, constipation  A complete HPI/ROS/PMH/PSH/SH/FH are unobtainable due to: N/A    Context: Yvrose Duncan is a 72 y.o. female who presents to the ED c/o abdominal pain and difficulty having bowel movements for the past week or so.  She has had decreased p.o. intake as well.  Family reports that she is weak.  Mild nausea but no vomiting.  No fevers or chills.  She had a BM several days ago, but it took over an hour to produce and her stool was very hard.  She has not noticed any blood in her stools.  She was recently treated for colitis with Augmentin (see discussion below)-she only took 1 or 2 doses of Flagyl that was subsequently prescribed by her oncologist and follow-up.  She is hungry, but she does not want to eat because of her difficulty having a bowel movement.  She also has pain in her low back.    Duration: Approximately a week  Onset: Gradual  Timing: Intermittent  Location: Lower back and abdomen  Radiation: No  Quality: Aching  Intensity/Severity: Moderate to severe  Progression: Continue  Associated Symptoms: Nausea  Aggravating Factors: Eating, movement  Alleviating Factors: Nothing  Previous Episodes: Yes  Treatment before arrival: Yes-she was seen at [facility] emergency department earlier today.    The patient was placed in a mask in triage, hand hygiene was performed before and after my interaction with the patient.  I wore a mask, safety glasses and gloves during my entire interaction with the patient.    PAST MEDICAL HISTORY  Active Ambulatory Problems     Diagnosis Date Noted   • Malignant neoplasm of head of pancreas (HCC) 01/21/2020   • Age-related nuclear cataract, bilateral 02/19/2020   • Anxiety 05/04/2020   • Diabetes mellitus (HCC) 01/18/2020   • Elevated LFTs 01/18/2020   • Exocrine  pancreatic insufficiency 05/04/2020   • Gallstones 06/30/2021   • Gram negative septicemia (HCC) 02/06/2020   • Heartburn 06/30/2021   • Essential (primary) hypertension 09/11/2018   • Hypomagnesemia 06/07/2021   • Hyponatremia 06/07/2021   • Myopia, bilateral 02/19/2020   • Portal vein thrombosis 02/05/2020   • Presbyopia 02/19/2020   • Severe sepsis (HCC) 02/06/2020   • Sleep apnea 06/30/2021   • Type 2 diabetes mellitus without complication (HCC) 09/11/2018   • Encounter for adjustment or management of vascular access device 11/17/2021   • History of pancreatic cancer 04/15/2020   • Hyperlipidemia LDL goal <70 03/30/2022   • Diverticulitis 07/05/2022     Resolved Ambulatory Problems     Diagnosis Date Noted   • No Resolved Ambulatory Problems     Past Medical History:   Diagnosis Date   • Diabetes (HCC)    • Essential hypertension    • No family history of colorectal cancer    • Pancreatic cancer (HCC)          PAST SURGICAL HISTORY  Past Surgical History:   Procedure Laterality Date   • APPENDECTOMY     • COLONOSCOPY     • ERCP     • FOOT SURGERY     • GALLBLADDER SURGERY     • HERNIA REPAIR     • OOPHORECTOMY     • UPPER GASTROINTESTINAL ENDOSCOPY     • WHIPPLE PROCEDURE           FAMILY HISTORY  Family History   Problem Relation Age of Onset   • Heart attack Mother    • Diabetes Mother    • Heart disease Mother    • Heart attack Father    • Colon cancer Neg Hx          SOCIAL HISTORY  Social History     Socioeconomic History   • Marital status:    Tobacco Use   • Smoking status: Never Smoker   • Smokeless tobacco: Never Used   Vaping Use   • Vaping Use: Never used   Substance and Sexual Activity   • Alcohol use: Never   • Drug use: Never   • Sexual activity: Not Currently         ALLERGIES  Adhesive tape and Latex        REVIEW OF SYSTEMS  Review of Systems   Constitutional: Positive for appetite change and fatigue. Negative for chills and fever.   Respiratory: Negative for shortness of breath.     Cardiovascular: Negative for chest pain.   Gastrointestinal: Positive for abdominal pain, constipation, nausea and rectal pain. Negative for vomiting.   Neurological: Positive for weakness.        All systems reviewed and negative except for those discussed in HPI.       PHYSICAL EXAM    I have reviewed the triage vital signs and nursing notes.    ED Triage Vitals [07/10/22 1517]   Temp Heart Rate Resp BP SpO2   97 °F (36.1 °C) 91 18 174/90 98 %      Temp src Heart Rate Source Patient Position BP Location FiO2 (%)   Tympanic -- -- -- --       Physical Exam   Constitutional: Pt. is oriented to person, place, and time and well-developed, well-nourished, and in no distress.   HENT: Normocephalic and atraumatic.   Neck: Normal range of motion. Neck supple. No JVD present.   Cardiovascular: Normal rate, regular rhythm and normal heart sounds. No murmur heard.  Pulmonary/Chest: Effort normal and breath sounds normal. No stridor. No respiratory distress. No wheezes, no rales.   Abdominal: Soft. Bowel sounds are normal. No distension. There is no tenderness. There is no rebound and no guarding.  Abdominal exam is benign.  Musculoskeletal: Normal range of motion. No edema, tenderness or deformity.  She has mild tenderness palpation of the lower right lumbar/sacral area.  Neurological: Pt. is alert and oriented to person, place, and time.  She has no focal neurologic deficits  Skin: Skin is warm and dry. No rash noted. Pt. is not diaphoretic. No erythema.   Psychiatric: Mood, affect and judgment normal.  She is pleasant and cooperative.  Nursing note and vitals reviewed.        LAB RESULTS  No results found for this or any previous visit (from the past 24 hour(s)).    Ordered the above labs and independently reviewed the results.        RADIOLOGY  No Radiology Exams Resulted Within Past 24 Hours    I ordered the above noted radiological studies. Reviewed by me and discussed with radiologist.  See dictation for official  radiology interpretation.      PROCEDURES    Procedures      MEDICATIONS GIVEN IN ER    Medications   iopamidol (ISOVUE-300) 61 % injection 100 mL (85 mL Intravenous Given by Other 7/10/22 1903)   HYDROmorphone (DILAUDID) injection 0.5 mg (0.5 mg Intravenous Given 7/10/22 1944)   ondansetron (ZOFRAN) injection 4 mg (4 mg Intravenous Given 7/10/22 1944)         PROGRESS, DATA ANALYSIS, CONSULTS, AND MEDICAL DECISION MAKING    Any/all labs have been independently reviewed by me.  Any/all radiology studies have been reviewed by me and discussed with radiologist dictating the report.   EKG's independently viewed and interpreted by me.  Discussion below represents my analysis of pertinent findings related to patient's condition, differential diagnosis, treatment plan and final disposition.    Number of Diagnoses or Management Options     Amount and/or Complexity of Data Reviewed  Clinical lab tests:  Yes  Tests in the radiology section of CPT®:  Yes  Tests in the medicine section of CPT®:  Yes  Review and summarize past medical records:  (Yes-see below)  Independent visualization of images, tracings, or specimens: (Yes-see below)      ED Course as of 07/18/22 1906   Sun Jul 10, 2022   1717 Prior record review-patient was seen earlier this date at [facility] emergency room for similar symptoms.  Labs were unremarkable.    Patient also has a history of pancreatic cancer and sees Dr. Acharya with oncology in [place].  She was last seen there on 6/28/2022.  She had a Whipple in 2020 and was treated with adjuvant chemotherapy.  She was diagnosed with colitis/diverticulitis-Flagyl and Augmentin were prescribed. [WC]   1937 Patient complaining of pain-Dilaudid and Zofran ordered.  Awaiting CT read. [WC]   1937 Immature Granulocyte Rel %: 0.5 [WC]   1953 CT of the abdomen pelvis was independently visualized by me and discussed with/interpreted by Dr. Calderón (radiology)-impression is as follows:  1. The  patient has history of a Whipple procedure. Unfortunately, prior  imaging is not available for. There is dilatation of the remainder of  the pancreatic duct, and there is confluent soft tissue which is noted  at the pancreaticojejunostomy. This is poorly assessed on a non pancreas  protocol CT. However, the patient recently had a PET which showed  abnormal FDG accumulation this area, suggesting recurrent disease.  Patient is also noted to have thrombus within the portal vein, as well  as within the superior mesenteric vein, as well as attenuation of the  confluence of the portal vein and superior mesenteric vein. There are  multiple shotty mesenteric lymph nodes noted within the right upper  quadrant. There appearance is concerning for additional malignant  involvement, given history and other findings.  2. The patient has 3 incisional hernias identified within the  intra-abdominal wall. These do contain bowel, although there is no  evidence of bowel obstruction. [WC]   2012 Patient states she is feeling much better.  Lab and CT results were discussed with her and her family.  Admission was offered, but she declines.  Evidently the portal vein thromboses are chronic per her report.  She would like to go home and follow-up with her oncologist as an outpatient. [WC]      ED Course User Index  [WC] Yair Wahl MD       AS OF 19:06 EDT VITALS:    BP - 170/90  HR - 71  TEMP - 97 °F (36.1 °C) (Tympanic)  02 SATS - 99%        DIAGNOSIS  Final diagnoses:   Portal vein thrombosis   Superior mesenteric vein thrombosis (HCC)   Generalized abdominal pain   History of pancreatic cancer   H/O Whipple procedure         DISPOSITION  Discharged           Yair Wahl MD  07/10/22 2014       Yair Wahl MD  07/18/22 3406

## 2022-07-10 NOTE — ED NOTES
Called to pt's room. Pt and family reporting pt complaining of discomfort and abdominal pain. MD Wahl notified. Will await orders.

## 2022-07-10 NOTE — ED TRIAGE NOTES
Patient to ed from  with complaints of weakness and abd pain. Hx of pancreatitic cancer and diverticulitis. Patient was seen at [facility] and discharged.

## 2022-07-11 ENCOUNTER — APPOINTMENT (OUTPATIENT)
Dept: MRI IMAGING | Facility: HOSPITAL | Age: 72
End: 2022-07-11

## 2022-07-11 ENCOUNTER — TELEPHONE (OUTPATIENT)
Dept: ONCOLOGY | Facility: HOSPITAL | Age: 72
End: 2022-07-11

## 2022-07-11 ENCOUNTER — HOSPITAL ENCOUNTER (EMERGENCY)
Facility: HOSPITAL | Age: 72
Discharge: HOME | End: 2022-07-11
Attending: EMERGENCY MEDICINE | Admitting: EMERGENCY MEDICINE

## 2022-07-11 VITALS
HEART RATE: 78 BPM | TEMPERATURE: 98 F | WEIGHT: 161 LBS | DIASTOLIC BLOOD PRESSURE: 79 MMHG | BODY MASS INDEX: 27.49 KG/M2 | SYSTOLIC BLOOD PRESSURE: 171 MMHG | OXYGEN SATURATION: 95 % | HEIGHT: 64 IN | RESPIRATION RATE: 18 BRPM

## 2022-07-11 DIAGNOSIS — M54.50 LUMBAR BACK PAIN: Primary | ICD-10-CM

## 2022-07-11 DIAGNOSIS — M48.061 FORAMINAL STENOSIS OF LUMBAR REGION: ICD-10-CM

## 2022-07-11 LAB
ALBUMIN SERPL-MCNC: 4.2 G/DL (ref 3.5–5.2)
ALBUMIN/GLOB SERPL: 1.3 G/DL
ALP SERPL-CCNC: 131 U/L (ref 39–117)
ALT SERPL W P-5'-P-CCNC: 30 U/L (ref 1–33)
ANION GAP SERPL CALCULATED.3IONS-SCNC: 20.7 MMOL/L (ref 5–15)
AST SERPL-CCNC: 35 U/L (ref 1–32)
BASOPHILS # BLD AUTO: 0.03 10*3/MM3 (ref 0–0.2)
BASOPHILS NFR BLD AUTO: 0.3 % (ref 0–1.5)
BILIRUB SERPL-MCNC: 0.8 MG/DL (ref 0–1.2)
BUN SERPL-MCNC: 12 MG/DL (ref 8–23)
BUN/CREAT SERPL: 16 (ref 7–25)
CALCIUM SPEC-SCNC: 9.6 MG/DL (ref 8.6–10.5)
CHLORIDE SERPL-SCNC: 98 MMOL/L (ref 98–107)
CO2 SERPL-SCNC: 16.3 MMOL/L (ref 22–29)
CREAT SERPL-MCNC: 0.75 MG/DL (ref 0.57–1)
DEPRECATED RDW RBC AUTO: 42.5 FL (ref 37–54)
EGFRCR SERPLBLD CKD-EPI 2021: 84.2 ML/MIN/1.73
EOSINOPHIL # BLD AUTO: 0.04 10*3/MM3 (ref 0–0.4)
EOSINOPHIL NFR BLD AUTO: 0.5 % (ref 0.3–6.2)
ERYTHROCYTE [DISTWIDTH] IN BLOOD BY AUTOMATED COUNT: 13.1 % (ref 12.3–15.4)
GLOBULIN UR ELPH-MCNC: 3.2 GM/DL
GLUCOSE SERPL-MCNC: 274 MG/DL (ref 65–99)
HCT VFR BLD AUTO: 33.7 % (ref 34–46.6)
HGB BLD-MCNC: 11.4 G/DL (ref 12–15.9)
IMM GRANULOCYTES # BLD AUTO: 0.04 10*3/MM3 (ref 0–0.05)
IMM GRANULOCYTES NFR BLD AUTO: 0.5 % (ref 0–0.5)
LIPASE SERPL-CCNC: 5 U/L (ref 13–60)
LYMPHOCYTES # BLD AUTO: 1.3 10*3/MM3 (ref 0.7–3.1)
LYMPHOCYTES NFR BLD AUTO: 14.7 % (ref 19.6–45.3)
MCH RBC QN AUTO: 30.5 PG (ref 26.6–33)
MCHC RBC AUTO-ENTMCNC: 33.8 G/DL (ref 31.5–35.7)
MCV RBC AUTO: 90.1 FL (ref 79–97)
MONOCYTES # BLD AUTO: 0.53 10*3/MM3 (ref 0.1–0.9)
MONOCYTES NFR BLD AUTO: 6 % (ref 5–12)
NEUTROPHILS NFR BLD AUTO: 6.88 10*3/MM3 (ref 1.7–7)
NEUTROPHILS NFR BLD AUTO: 78 % (ref 42.7–76)
NRBC BLD AUTO-RTO: 0 /100 WBC (ref 0–0.2)
PLATELET # BLD AUTO: 132 10*3/MM3 (ref 140–450)
PMV BLD AUTO: 10.3 FL (ref 6–12)
POTASSIUM SERPL-SCNC: 3.6 MMOL/L (ref 3.5–5.2)
PROT SERPL-MCNC: 7.4 G/DL (ref 6–8.5)
RBC # BLD AUTO: 3.74 10*6/MM3 (ref 3.77–5.28)
SODIUM SERPL-SCNC: 135 MMOL/L (ref 136–145)
WBC NRBC COR # BLD: 8.82 10*3/MM3 (ref 3.4–10.8)

## 2022-07-11 PROCEDURE — 99283 EMERGENCY DEPT VISIT LOW MDM: CPT

## 2022-07-11 PROCEDURE — 80053 COMPREHEN METABOLIC PANEL: CPT | Performed by: EMERGENCY MEDICINE

## 2022-07-11 PROCEDURE — 96374 THER/PROPH/DIAG INJ IV PUSH: CPT

## 2022-07-11 PROCEDURE — 85025 COMPLETE CBC W/AUTO DIFF WBC: CPT | Performed by: EMERGENCY MEDICINE

## 2022-07-11 PROCEDURE — 25010000002 HYDROMORPHONE 1 MG/ML SOLUTION: Performed by: EMERGENCY MEDICINE

## 2022-07-11 PROCEDURE — 83690 ASSAY OF LIPASE: CPT | Performed by: EMERGENCY MEDICINE

## 2022-07-11 PROCEDURE — 72148 MRI LUMBAR SPINE W/O DYE: CPT

## 2022-07-11 PROCEDURE — 96376 TX/PRO/DX INJ SAME DRUG ADON: CPT

## 2022-07-11 PROCEDURE — 96375 TX/PRO/DX INJ NEW DRUG ADDON: CPT

## 2022-07-11 PROCEDURE — 25010000002 ONDANSETRON PER 1 MG: Performed by: EMERGENCY MEDICINE

## 2022-07-11 PROCEDURE — 25010000002 METHYLPREDNISOLONE PER 125 MG: Performed by: EMERGENCY MEDICINE

## 2022-07-11 RX ORDER — ONDANSETRON 2 MG/ML
4 INJECTION INTRAMUSCULAR; INTRAVENOUS ONCE
Status: COMPLETED | OUTPATIENT
Start: 2022-07-11 | End: 2022-07-11

## 2022-07-11 RX ORDER — CYCLOBENZAPRINE HCL 10 MG
10 TABLET ORAL 3 TIMES DAILY
Qty: 20 TABLET | Refills: 0 | Status: SHIPPED | OUTPATIENT
Start: 2022-07-11 | End: 2022-09-19

## 2022-07-11 RX ORDER — PREDNISONE 50 MG/1
50 TABLET ORAL DAILY
Qty: 5 TABLET | Refills: 0 | Status: SHIPPED | OUTPATIENT
Start: 2022-07-11 | End: 2022-08-19

## 2022-07-11 RX ORDER — SODIUM CHLORIDE 0.9 % (FLUSH) 0.9 %
10 SYRINGE (ML) INJECTION AS NEEDED
Status: DISCONTINUED | OUTPATIENT
Start: 2022-07-11 | End: 2022-07-11 | Stop reason: HOSPADM

## 2022-07-11 RX ORDER — METHYLPREDNISOLONE SODIUM SUCCINATE 125 MG/2ML
125 INJECTION, POWDER, LYOPHILIZED, FOR SOLUTION INTRAMUSCULAR; INTRAVENOUS ONCE
Status: COMPLETED | OUTPATIENT
Start: 2022-07-11 | End: 2022-07-11

## 2022-07-11 RX ADMIN — METHYLPREDNISOLONE SODIUM SUCCINATE 125 MG: 125 INJECTION, POWDER, FOR SOLUTION INTRAMUSCULAR; INTRAVENOUS at 19:31

## 2022-07-11 RX ADMIN — HYDROMORPHONE HYDROCHLORIDE 1 MG: 1 INJECTION, SOLUTION INTRAMUSCULAR; INTRAVENOUS; SUBCUTANEOUS at 19:31

## 2022-07-11 RX ADMIN — ONDANSETRON 4 MG: 2 INJECTION INTRAMUSCULAR; INTRAVENOUS at 16:03

## 2022-07-11 RX ADMIN — HYDROMORPHONE HYDROCHLORIDE 1 MG: 1 INJECTION, SOLUTION INTRAMUSCULAR; INTRAVENOUS; SUBCUTANEOUS at 16:04

## 2022-07-11 RX ADMIN — Medication 10 ML: at 19:31

## 2022-07-11 RX ADMIN — SODIUM CHLORIDE 1000 ML: 9 INJECTION, SOLUTION INTRAVENOUS at 16:01

## 2022-07-11 NOTE — TELEPHONE ENCOUNTER
Jodie called and states that they have taken the pt to the ER for the third time in two days. Jodie states that the pt has anorexia, abd pain, back pain, shaking, AMS, nausea, diarrhea, and extreme weakness. Jodie states that they have been told in previous ER visits that the pt's colitis has cleared up and is not the issue. When questioned about the AMS Jodie states that the pt is seeing her dog that has been dead for some time. When questioned Jodie states that the pt has been taking Zofran and Pepcid as directed with out relief. The pt is not eating or drinking due to the abd pain and nausea. Jodie states that the pt is so weak that she is unable to ambulate. Jodie states that the pt is currently at [facility] ER. Jodie is trying to notify Dr Acharya of the pt's health status and issue's. Jodie is requesting to speak to Dr Acharya or Lizeth HUMMEL.

## 2022-07-11 NOTE — ED NOTES
Patient states she was here yesterday and that she is still hurting, states she has pain in her flank on right side, denies nausea and vomiting, states that she has had a decrease in appetite, stated she has had 6 loose bowel movements today.

## 2022-07-11 NOTE — ED PROVIDER NOTES
Time: 3:37 PM EDT  Arrived by: private car  Chief Complaint: Back Pain  History provided by: pt  History is limited by: N/A     History of Present Illness:  Patient is a 72 y.o. year old female who presents to the emergency department with  A chief complaint of back pain. Pt states she has been experiencing the pain for about 2 weeks. Pt states the pain is intermittent, increases at night, and denies the pain radiates to her leg. Pt has had a decreased appetite as well. Pt also reports hearing and seeing hallucinations. Pt has a history of pancreatic cancer and pt was diagnosed with colitis in June. Dr. Acharya is the doctor managing the pancreatic cancer. Pt has a history of diabetes. Pt. Takes oxycodone at home for pain.          Similar Symptoms Previously: Yes  Recently seen: Yes - Seen twice yesterday (7/10/22) For similar symptoms      Patient Care Team  Primary Care Provider: Brock Quach PA    Past Medical History:     Allergies   Allergen Reactions   • Adhesive Tape Hives and Itching     Blisters   • Latex Other (See Comments)     Blistering of skin     Past Medical History:   Diagnosis Date   • Diabetes (HCC)    • Essential hypertension    • Gallstones    • Heartburn    • History of pancreatic cancer    • No family history of colorectal cancer    • Pancreatic cancer (HCC)    • Sleep apnea      Past Surgical History:   Procedure Laterality Date   • APPENDECTOMY     • COLONOSCOPY     • ERCP     • FOOT SURGERY     • GALLBLADDER SURGERY     • HERNIA REPAIR     • OOPHORECTOMY     • UPPER GASTROINTESTINAL ENDOSCOPY     • WHIPPLE PROCEDURE       Family History   Problem Relation Age of Onset   • Heart attack Mother    • Diabetes Mother    • Heart disease Mother    • Heart attack Father    • Colon cancer Neg Hx        Home Medications:  Prior to Admission medications    Medication Sig Start Date End Date Taking? Authorizing Provider   atorvastatin (LIPITOR) 20 MG tablet Take 20 mg by mouth Every Night.  4/14/21   Ganesh Crum MD   busPIRone (BUSPAR) 10 MG tablet Take 10 mg by mouth 2 (Two) Times a Day. 5/19/21   Ganesh Crum MD   carvedilol (COREG) 6.25 MG tablet carvedilol 6.25 mg oral tablet take 1 tablet (6.25 mg) by oral route 2 times per day with food   Suspended    Ganesh Crum MD   Creon 6000-96699 units capsule delayed-release particles capsule Take 18,000 units of lipase by mouth 3 (Three) Times a Day. 5/26/21   Ganesh Crum MD   Dulaglutide (Trulicity) 0.75 MG/0.5ML solution pen-injector     Ganesh Crum MD   famotidine (PEPCID) 20 MG tablet Take 1 tablet by mouth 2 (Two) Times a Day. 7/10/22   Kiki Barba MD   Farxiga 5 MG tablet tablet Take 5 mg by mouth Daily. 2/23/22   Ganesh Crum MD   fluconazole (DIFLUCAN) 150 MG tablet TAKE 1 TABLET BY MOUTH AS A ONE TIME DOSE, REPEAT IN 3 DAYS 12/30/21   Ganesh Crum MD   fluconazole (DIFLUCAN) 150 MG tablet Take 150 mg by mouth. 6/15/22   Ganesh Crum MD   fluticasone (FLONASE) 50 MCG/ACT nasal spray  5/12/21   Ganesh Crum MD   fluticasone (FLONASE) 50 MCG/ACT nasal spray 2 sprays into the nostril(s) as directed by provider Daily. 4/1/22   Ganesh Crum MD   insulin aspart (novoLOG) 100 UNIT/ML injection Inject 5-10 Units under the skin into the appropriate area as directed 3 (Three) Times a Day. 11/9/21   Ganesh Crum MD   Insulin Glargine (BASAGLAR KWIKPEN) 100 UNIT/ML injection pen INJECT 15 TO 25 UNITS SUBCUTANEOUSLY NIGHTLY USING SLIDING SCALE 2/10/22   Ganesh Crum MD   Levemir 100 UNIT/ML injection INJECT 10 UNITS SUBCUTANEOUSLY ONCE DAILY 4/21/21   Ganesh Crum MD   lisinopril-hydrochlorothiazide (PRINZIDE,ZESTORETIC) 20-12.5 MG per tablet Take 1 tablet by mouth Daily. 12/31/21   Ganesh Crum MD   loratadine (CLARITIN) 10 MG tablet Take 10 mg by mouth Daily. 5/22/21   Provider, MD Ganesh   Mag64 64 MG DR tablet Take   by mouth Daily. 6/9/21   Ganesh Crum MD   Magnesium 65 MG tablet Take  by mouth.    Ganesh Crum MD   metFORMIN (GLUCOPHAGE) 1000 MG tablet  5/14/21   Ganesh Crum MD   montelukast (SINGULAIR) 10 MG tablet Take 10 mg by mouth Daily. 2/5/22   Ganesh Crum MD   nitrofurantoin, macrocrystal-monohydrate, (MACROBID) 100 MG capsule  6/15/22   Ganesh Crum MD   omeprazole (priLOSEC) 40 MG capsule  3/22/21   Ganesh Crum MD   omeprazole (priLOSEC) 40 MG capsule Take 40 mg by mouth Daily. 4/1/22   Ganesh Crum MD   ondansetron ODT (ZOFRAN-ODT) 4 MG disintegrating tablet Place 1 tablet on the tongue Every 8 (Eight) Hours As Needed for Nausea or Vomiting. 7/10/22   Kiki Barba MD   OneTouch Ultra test strip USE 1 STRIP TO CHECK GLUCOSE ONCE DAILY AS DIRECTED 2/5/22   Ganesh Crum MD   oxyCODONE (Roxicodone) 5 MG immediate release tablet Take 1 tablet by mouth Every 4 (Four) Hours As Needed for Moderate Pain . 6/28/22   Eboni Acharya MD PhD   prazosin (MINIPRESS) 2 MG capsule Take 2 mg by mouth Every Night. 5/27/21   Ganesh Crum MD   sertraline (ZOLOFT) 100 MG tablet  5/22/21   Ganesh Crum MD   sertraline (ZOLOFT) 100 MG tablet Take 100 mg by mouth Daily. 4/1/22 9/28/22  Ganesh Crum MD   sodium chloride 1 g tablet Take 1 g by mouth 3 (Three) Times a Day.    Ganesh Crum MD   traZODone (DESYREL) 100 MG tablet  5/12/21   Ganesh Crum MD   Turmeric 400 MG capsule turmeric 400 mg oral capsule take 1 capsule by oral route daily   Active    Ganesh Crum MD        Social History:   Social History     Tobacco Use   • Smoking status: Never Smoker   • Smokeless tobacco: Never Used   Vaping Use   • Vaping Use: Never used   Substance Use Topics   • Alcohol use: Never   • Drug use: Never     Recent travel: no     Review of Systems:  Review of Systems   Constitutional: Positive for appetite change (Pt  "reports decreased appetite). Negative for chills and fever.   HENT: Negative for sore throat.    Eyes: Negative for photophobia.   Respiratory: Negative for shortness of breath.    Cardiovascular: Negative for chest pain.   Gastrointestinal: Negative for abdominal pain, diarrhea, nausea and vomiting.   Genitourinary: Negative for dysuria.   Musculoskeletal: Positive for back pain. Negative for neck pain.   Skin: Negative for wound.   Neurological: Negative for headaches.   Psychiatric/Behavioral: Positive for hallucinations.   All other systems reviewed and are negative.       Physical Exam:  /79 (BP Location: Left arm, Patient Position: Lying)   Pulse 78   Temp 98 °F (36.7 °C) (Oral)   Resp 18   Ht 162.6 cm (64\")   Wt 73 kg (161 lb)   SpO2 95%   BMI 27.64 kg/m²     Physical Exam  Vitals and nursing note reviewed.   Constitutional:       General: She is not in acute distress.  HENT:      Head: Normocephalic and atraumatic.   Eyes:      Extraocular Movements: Extraocular movements intact.   Cardiovascular:      Rate and Rhythm: Normal rate and regular rhythm.   Pulmonary:      Effort: Pulmonary effort is normal. No respiratory distress.      Breath sounds: Normal breath sounds.   Abdominal:      General: Abdomen is flat.      Palpations: Abdomen is soft.      Tenderness: There is no abdominal tenderness.   Musculoskeletal:         General: Normal range of motion.      Cervical back: Normal range of motion and neck supple.      Lumbar back: Tenderness (In right lower paraspinal region) present.   Skin:     General: Skin is warm and dry.      Capillary Refill: Capillary refill takes less than 2 seconds.   Neurological:      Mental Status: She is alert and oriented to person, place, and time. Mental status is at baseline.                Medications in the Emergency Department:  Medications   ondansetron (ZOFRAN) injection 4 mg (4 mg Intravenous Given 7/11/22 1731)   HYDROmorphone (DILAUDID) injection 1 mg " (1 mg Intravenous Given 7/11/22 1604)   sodium chloride 0.9 % bolus 1,000 mL (0 mL Intravenous Stopped 7/11/22 1826)   HYDROmorphone (DILAUDID) injection 1 mg (1 mg Intravenous Given 7/11/22 1931)   methylPREDNISolone sodium succinate (SOLU-Medrol) injection 125 mg (125 mg Intravenous Given 7/11/22 1931)        Labs  Lab Results (last 24 hours)     Procedure Component Value Units Date/Time    CBC & Differential [[id number]]  (Abnormal) Collected: 07/11/22 1602    Specimen: Blood Updated: 07/11/22 1620    Narrative:      The following orders were created for panel order CBC & Differential.  Procedure                               Abnormality         Status                     ---------                               -----------         ------                     CBC Auto Differential[[id number]]        Abnormal            Final result               Scan Slide[[id number]]                                                                    Please view results for these tests on the individual orders.    Comprehensive Metabolic Panel [[id number]]  (Abnormal) Collected: 07/11/22 1602    Specimen: Blood Updated: 07/11/22 1637     Glucose 274 mg/dL      BUN 12 mg/dL      Creatinine 0.75 mg/dL      Sodium 135 mmol/L      Potassium 3.6 mmol/L      Chloride 98 mmol/L      CO2 16.3 mmol/L      Calcium 9.6 mg/dL      Total Protein 7.4 g/dL      Albumin 4.20 g/dL      ALT (SGPT) 30 U/L      AST (SGOT) 35 U/L      Alkaline Phosphatase 131 U/L      Total Bilirubin 0.8 mg/dL      Globulin 3.2 gm/dL      A/G Ratio 1.3 g/dL      BUN/Creatinine Ratio 16.0     Anion Gap 20.7 mmol/L      eGFR 84.2 mL/min/1.73      Comment: National Kidney Foundation and American Society of Nephrology (ASN) Task Force recommended calculation based on the Chronic Kidney Disease Epidemiology Collaboration (CKD-EPI) equation refit without adjustment for race.       Narrative:      GFR Normal >60  Chronic Kidney Disease <60  Kidney Failure <15      Lipase  [[id number]]  (Abnormal) Collected: 07/11/22 1602    Specimen: Blood Updated: 07/11/22 1637     Lipase 5 U/L     CBC Auto Differential [[id number]]  (Abnormal) Collected: 07/11/22 1602    Specimen: Blood Updated: 07/11/22 1620     WBC 8.82 10*3/mm3      RBC 3.74 10*6/mm3      Hemoglobin 11.4 g/dL      Hematocrit 33.7 %      MCV 90.1 fL      MCH 30.5 pg      MCHC 33.8 g/dL      RDW 13.1 %      RDW-SD 42.5 fl      MPV 10.3 fL      Platelets 132 10*3/mm3      Neutrophil % 78.0 %      Lymphocyte % 14.7 %      Monocyte % 6.0 %      Eosinophil % 0.5 %      Basophil % 0.3 %      Immature Grans % 0.5 %      Neutrophils, Absolute 6.88 10*3/mm3      Lymphocytes, Absolute 1.30 10*3/mm3      Monocytes, Absolute 0.53 10*3/mm3      Eosinophils, Absolute 0.04 10*3/mm3      Basophils, Absolute 0.03 10*3/mm3      Immature Grans, Absolute 0.04 10*3/mm3      nRBC 0.0 /100 WBC            Imaging:  MRI Lumbar Spine Without Contrast    Result Date: 7/11/2022  PROCEDURE: MRI LUMBAR SPINE WO CONTRAST  COMPARISON: [facility], PET, NM PET/CT SKULL BASE TO MID THIGH, 6/27/2022, 12:57.  [facility], CT, CT ABDOMEN PELVIS WO CONTRAST, 6/24/2022, 14:29.  INDICATIONS: lumbar pain  TECHNIQUE: Multiplanar multisequence images of the lumbar spine without contrast.   FINDINGS:  No evidence of fracture or acute osseous abnormality.  No evidence of osseous metastatic disease.  The conus medullaris has a normal appearance ending at the T12-L1 level.  The abdominal aorta has a normal caliber.  There is a partially visualized simple cyst in the right kidney.  Age-appropriate disc degeneration and facet OA are present throughout the lumbar spine.  No evidence of high-grade central canal stenosis stenosis.  There are areas of moderate foraminal stenosis.  IMPRESSION: Degenerative change.  No acute findings.  BOB WINTERS MD       Electronically Signed and Approved By: BOB WINTERS MD on 7/11/2022 at 17:17                Procedures:  Procedures    Progress                            Medical Decision Making:  MDM     Final diagnoses:   Lumbar back pain   Foraminal stenosis of lumbar region        Disposition:  ED Disposition     ED Disposition   Discharge    Condition   Stable    Comment   --             This medical record created using voice recognition software.           Yash Corbin  07/11/22 1557       Sarbjit Ch DO  07/11/22 5883

## 2022-07-12 NOTE — TELEPHONE ENCOUNTER
Spoke with patient. She reports feeling better today, and is eating and drinking better today. States pain is a little better, but she still has problems with memory. Advised patient that Dr. Acharya reviewed her recent CT scan with Dr. Casillas at  and it shows she has a blood clot in portal vein. We are sending prescription for Eliquis to her pharmacy.  Patient's grandaughter present for the conversation as well.

## 2022-07-13 ENCOUNTER — TELEPHONE (OUTPATIENT)
Dept: ONCOLOGY | Facility: HOSPITAL | Age: 72
End: 2022-07-13

## 2022-07-13 NOTE — TELEPHONE ENCOUNTER
Spoke with Jodie, patient's daughter. Patient has been in the ER x 3 in the last few days with increasing pain and confusion. Jodie states she thinks the patient was taking her pain meds instead of her Flagyl and that caused the increased confusion. She states that since this last ER visit, patient was able to sleep for 12 hrs and was able to get up and move around at home and is eating and drinking better. She is also less confused.  She is currently using a walker to move around at home and Jodie is going to talk to the PCP about physical therapy. Patient was given a dose of steroids in the ER as well which seems to be helping her symptoms. She reports the patient feels her pain is more back related and, while she does have some discomfort when she eats, she doesn't feel that is where her pain is mostly located. Offered to bring her in for IV fluids 1-2 times a week. Jodie will call back if she feels patient is not able to stay hydrated and we will set that up at a later time. For now, we will continue with the same plan of PET scan in August and follow up after.  Jodie will contact this office if anything changes in her condition.

## 2022-08-10 ENCOUNTER — TELEPHONE (OUTPATIENT)
Dept: ONCOLOGY | Facility: HOSPITAL | Age: 72
End: 2022-08-10

## 2022-08-10 ENCOUNTER — LAB (OUTPATIENT)
Dept: LAB | Facility: HOSPITAL | Age: 72
End: 2022-08-10

## 2022-08-10 DIAGNOSIS — C25.0 MALIGNANT NEOPLASM OF HEAD OF PANCREAS: Primary | ICD-10-CM

## 2022-08-10 DIAGNOSIS — C25.0 MALIGNANT NEOPLASM OF HEAD OF PANCREAS: ICD-10-CM

## 2022-08-10 LAB
ALBUMIN SERPL-MCNC: 3.8 G/DL (ref 3.5–5.2)
ALBUMIN/GLOB SERPL: 1.4 G/DL
ALP SERPL-CCNC: 164 U/L (ref 39–117)
ALT SERPL W P-5'-P-CCNC: 50 U/L (ref 1–33)
ANION GAP SERPL CALCULATED.3IONS-SCNC: 9.2 MMOL/L (ref 5–15)
AST SERPL-CCNC: 73 U/L (ref 1–32)
BASOPHILS # BLD AUTO: 0.04 10*3/MM3 (ref 0–0.2)
BASOPHILS NFR BLD AUTO: 0.8 % (ref 0–1.5)
BILIRUB SERPL-MCNC: 0.4 MG/DL (ref 0–1.2)
BUN SERPL-MCNC: 15 MG/DL (ref 8–23)
BUN/CREAT SERPL: 14.9 (ref 7–25)
CALCIUM SPEC-SCNC: 9 MG/DL (ref 8.6–10.5)
CANCER AG19-9 SERPL-ACNC: 255 U/ML
CHLORIDE SERPL-SCNC: 104 MMOL/L (ref 98–107)
CO2 SERPL-SCNC: 22.8 MMOL/L (ref 22–29)
CREAT SERPL-MCNC: 1.01 MG/DL (ref 0.57–1)
DEPRECATED RDW RBC AUTO: 41.5 FL (ref 37–54)
EGFRCR SERPLBLD CKD-EPI 2021: 58.9 ML/MIN/1.73
EOSINOPHIL # BLD AUTO: 0.09 10*3/MM3 (ref 0–0.4)
EOSINOPHIL NFR BLD AUTO: 1.9 % (ref 0.3–6.2)
ERYTHROCYTE [DISTWIDTH] IN BLOOD BY AUTOMATED COUNT: 12.3 % (ref 12.3–15.4)
GLOBULIN UR ELPH-MCNC: 2.8 GM/DL
GLUCOSE SERPL-MCNC: 342 MG/DL (ref 65–99)
HCT VFR BLD AUTO: 32.8 % (ref 34–46.6)
HGB BLD-MCNC: 10.4 G/DL (ref 12–15.9)
IMM GRANULOCYTES # BLD AUTO: 0.03 10*3/MM3 (ref 0–0.05)
IMM GRANULOCYTES NFR BLD AUTO: 0.6 % (ref 0–0.5)
LYMPHOCYTES # BLD AUTO: 1.37 10*3/MM3 (ref 0.7–3.1)
LYMPHOCYTES NFR BLD AUTO: 28.5 % (ref 19.6–45.3)
MCH RBC QN AUTO: 29.2 PG (ref 26.6–33)
MCHC RBC AUTO-ENTMCNC: 31.7 G/DL (ref 31.5–35.7)
MCV RBC AUTO: 92.1 FL (ref 79–97)
MONOCYTES # BLD AUTO: 0.41 10*3/MM3 (ref 0.1–0.9)
MONOCYTES NFR BLD AUTO: 8.5 % (ref 5–12)
NEUTROPHILS NFR BLD AUTO: 2.87 10*3/MM3 (ref 1.7–7)
NEUTROPHILS NFR BLD AUTO: 59.7 % (ref 42.7–76)
NRBC BLD AUTO-RTO: 0.2 /100 WBC (ref 0–0.2)
PLATELET # BLD AUTO: 135 10*3/MM3 (ref 140–450)
PMV BLD AUTO: 9.9 FL (ref 6–12)
POTASSIUM SERPL-SCNC: 4.4 MMOL/L (ref 3.5–5.2)
PROT SERPL-MCNC: 6.6 G/DL (ref 6–8.5)
RBC # BLD AUTO: 3.56 10*6/MM3 (ref 3.77–5.28)
SODIUM SERPL-SCNC: 136 MMOL/L (ref 136–145)
WBC NRBC COR # BLD: 4.81 10*3/MM3 (ref 3.4–10.8)

## 2022-08-10 PROCEDURE — 85025 COMPLETE CBC W/AUTO DIFF WBC: CPT

## 2022-08-10 PROCEDURE — 86301 IMMUNOASSAY TUMOR CA 19-9: CPT

## 2022-08-10 PROCEDURE — 36415 COLL VENOUS BLD VENIPUNCTURE: CPT

## 2022-08-10 PROCEDURE — 80053 COMPREHEN METABOLIC PANEL: CPT

## 2022-08-10 NOTE — TELEPHONE ENCOUNTER
Spoke with patient's daughter, Jodie. Patient is having multiple issues including periodic nausea either before she eats or after she eats, increased abdominal pain that is often severe and makes it difficult to walk. She reports abdominal pain after she eats and burning pain in low abdomen when she is trying to have a BM. Patient has an appointment to see Dr. Sofia about hernia repair and possible colonoscopy this Friday. Patient is also scheduled for repeat PET scan and follow up with Dr. Acharya next week. Discussed with Shonda Lui NP and we are ordering some basic labs today. We will make further decisions based on the results of those labs. Jodie voices appreciation for our assistance and will take her mom to get labs drawn today.

## 2022-08-12 ENCOUNTER — TELEPHONE (OUTPATIENT)
Dept: ONCOLOGY | Facility: HOSPITAL | Age: 72
End: 2022-08-12

## 2022-08-12 NOTE — TELEPHONE ENCOUNTER
Advised Jodie patient needed to go to the ER at [facility] for evaluation. Advised that Dr. Galarza is there and he can evaluate the patient up there if necessary.

## 2022-08-12 NOTE — TELEPHONE ENCOUNTER
"Jodie called and left a vm. Jodie states that the pt continues to have increasing pain in her ABD. Jodie states that she saw a lab come back today on the pt's \"My Chart\" that is abnormal. Jodie is concerned and asking if the pt needs to go to the hospital for her abnormal lab and increased pain.     Note: the pt has an apt with her sx, Dr Sofia, today at 1515.  "

## 2022-08-18 ENCOUNTER — HOSPITAL ENCOUNTER (OUTPATIENT)
Dept: PET IMAGING | Facility: HOSPITAL | Age: 72
Discharge: HOME | End: 2022-08-18

## 2022-08-18 DIAGNOSIS — C25.0 MALIGNANT NEOPLASM OF HEAD OF PANCREAS: ICD-10-CM

## 2022-08-18 PROCEDURE — 78815 PET IMAGE W/CT SKULL-THIGH: CPT

## 2022-08-18 PROCEDURE — A9552 F18 FDG: HCPCS | Performed by: INTERNAL MEDICINE

## 2022-08-18 PROCEDURE — 0 FLUDEOXYGLUCOSE F18 SOLUTION: Performed by: INTERNAL MEDICINE

## 2022-08-18 RX ADMIN — FLUDEOXYGLUCOSE F18 1 DOSE: 300 INJECTION INTRAVENOUS at 09:57

## 2022-08-19 ENCOUNTER — OFFICE VISIT (OUTPATIENT)
Dept: ONCOLOGY | Facility: HOSPITAL | Age: 72
End: 2022-08-19

## 2022-08-19 ENCOUNTER — HOSPITAL ENCOUNTER (OUTPATIENT)
Dept: ONCOLOGY | Facility: HOSPITAL | Age: 72
Setting detail: INFUSION SERIES
Discharge: HOME | End: 2022-08-19

## 2022-08-19 VITALS
HEART RATE: 80 BPM | RESPIRATION RATE: 18 BRPM | OXYGEN SATURATION: 99 % | BODY MASS INDEX: 23.61 KG/M2 | SYSTOLIC BLOOD PRESSURE: 127 MMHG | TEMPERATURE: 98.8 F | WEIGHT: 137.57 LBS | DIASTOLIC BLOOD PRESSURE: 71 MMHG

## 2022-08-19 DIAGNOSIS — R11.0 NAUSEA: ICD-10-CM

## 2022-08-19 DIAGNOSIS — C25.0 MALIGNANT NEOPLASM OF HEAD OF PANCREAS: ICD-10-CM

## 2022-08-19 DIAGNOSIS — G89.3 CANCER ASSOCIATED PAIN: Primary | ICD-10-CM

## 2022-08-19 DIAGNOSIS — Z45.2 ENCOUNTER FOR ADJUSTMENT OR MANAGEMENT OF VASCULAR ACCESS DEVICE: Primary | ICD-10-CM

## 2022-08-19 PROCEDURE — 25010000002 HEPARIN LOCK FLUSH PER 10 UNITS: Performed by: INTERNAL MEDICINE

## 2022-08-19 PROCEDURE — G0463 HOSPITAL OUTPT CLINIC VISIT: HCPCS | Performed by: INTERNAL MEDICINE

## 2022-08-19 PROCEDURE — 99215 OFFICE O/P EST HI 40 MIN: CPT | Performed by: INTERNAL MEDICINE

## 2022-08-19 PROCEDURE — 96523 IRRIG DRUG DELIVERY DEVICE: CPT

## 2022-08-19 RX ORDER — SODIUM CHLORIDE 0.9 % (FLUSH) 0.9 %
20 SYRINGE (ML) INJECTION AS NEEDED
Status: DISCONTINUED | OUTPATIENT
Start: 2022-08-19 | End: 2022-08-20 | Stop reason: HOSPADM

## 2022-08-19 RX ORDER — HEPARIN SODIUM (PORCINE) LOCK FLUSH IV SOLN 100 UNIT/ML 100 UNIT/ML
500 SOLUTION INTRAVENOUS AS NEEDED
Status: CANCELLED | OUTPATIENT
Start: 2022-08-19

## 2022-08-19 RX ORDER — ONDANSETRON 8 MG/1
8 TABLET, ORALLY DISINTEGRATING ORAL EVERY 8 HOURS PRN
Qty: 30 TABLET | Refills: 5 | Status: SHIPPED | OUTPATIENT
Start: 2022-08-19

## 2022-08-19 RX ORDER — HEPARIN SODIUM (PORCINE) LOCK FLUSH IV SOLN 100 UNIT/ML 100 UNIT/ML
500 SOLUTION INTRAVENOUS AS NEEDED
Status: DISCONTINUED | OUTPATIENT
Start: 2022-08-19 | End: 2022-08-20 | Stop reason: HOSPADM

## 2022-08-19 RX ORDER — PREDNISONE 10 MG/1
40 TABLET ORAL DAILY
Qty: 40 TABLET | Refills: 0 | Status: ON HOLD | OUTPATIENT
Start: 2022-08-19 | End: 2022-09-20

## 2022-08-19 RX ORDER — OXYCODONE HYDROCHLORIDE 5 MG/1
5 TABLET ORAL EVERY 4 HOURS PRN
Qty: 90 TABLET | Refills: 0 | Status: SHIPPED | OUTPATIENT
Start: 2022-08-19

## 2022-08-19 RX ORDER — SODIUM CHLORIDE 0.9 % (FLUSH) 0.9 %
20 SYRINGE (ML) INJECTION AS NEEDED
Status: CANCELLED | OUTPATIENT
Start: 2022-08-19

## 2022-08-19 RX ORDER — LORAZEPAM 0.5 MG/1
.25-.5 TABLET ORAL EVERY 8 HOURS PRN
Qty: 60 TABLET | Refills: 0 | Status: SHIPPED | OUTPATIENT
Start: 2022-08-19 | End: 2022-09-28 | Stop reason: SDUPTHER

## 2022-08-19 RX ADMIN — HEPARIN SODIUM (PORCINE) LOCK FLUSH IV SOLN 100 UNIT/ML 500 UNITS: 100 SOLUTION at 14:57

## 2022-08-19 RX ADMIN — Medication 20 ML: at 14:57

## 2022-08-19 NOTE — PROGRESS NOTES
Patient  Yvrose Duncan    Location  [facility] HEMATOLOGY & ONCOLOGY    Chief Complaint  Pancreatic Cancer    Referring Provider: Brock Quach, *  PCP: Brock Quach PA    Subjective          Oncology/Hematology History Overview Note   Ms. Duncan is a 70-year-old female with pancreatic cancer.     Presented with abdomnial pain and jaundice. 1/21/2020 patient underwent EUS at [place] by Dr. Acevedo, pancreatic head mass, 2.8 x 2.7 cm in size as well as a common bile duct stricture.  She had a stent placed in the common bile duct. Biopsy was negative. The procedure was complicated by pancreatitis, mesenteric vein thrombosis and then E coli sepsis.       Then on 3/5/2020 she had worsening abdominal pain and was readmitted to [facility] with ascending cholangitis secondary to a blocked stent.  On 3/6/2020 the patient underwent ERCP with stent exchange by Dr. Clancy. CT abdomen/pelvis showed cystic areas within the head of the pancreas that may relate to pancreatitis or possibly branch type IPMN.     Referred to Dr. Casillas at the [facility].  She was admitted for feeding tube placement and nutritional optimization and pain control.  Whipple procedure on 4/15/2020.  Pathology revealed invasive moderately to poorly differentiated adenocarcinoma in the pancreas as well as an 5 of 21 lymph nodes.  She also had perineural invasion present.  Pathologic stage was pT2pN2.      Cycle 1 of adjuvant chemotherapy with gemcitabine and Abraxane on 5/22/2020.  Cycle 1 day 8 on 5/29/2020.  Cycle 2-day 1 on 6/15/2020, started doing treatment on days 1 and 15 of a q. 28-day cycle, C2D15 on 6/29/20  Cycle 3-day 1 on 7/13/2020  Cycle 4-day 1 on 8/10/2020    Iron deficiency anemia: Patient was treated with Injectafer on June 8 and 15.     on 3/7/20 was elevated at 273 and improved to 27.3 on 12/17/20.    CT scan on 12/16/20: No evidence of metastatic disease in the chest, abdomen, or pelvis.   Postoperative changes from Whipple procedure are noted.  CT scan on 3/12/2021: Previous Whipple procedure. No definite evidence for active malignancy in the chest, abdomen, or pelvis.     Malignant neoplasm of head of pancreas (HCC)   1/21/2020 Initial Diagnosis    Malignant neoplasm of head of pancreas (CMS/HCC)     4/15/2020 Surgery    Surgery       Procedure:  Whipple by Dr. Casillas at [facility]: Pathology revealed invasive moderately to poorly differentiated adenocarcinoma in the pancreas as well as an 5 of 21 lymph nodes.  She also had perineural invasion present.  Pathologic stage was pT2pN2.         5/22/2020 - 8/10/2020 Chemotherapy    Gemcitabine and Abraxane x 4 cycles         HPI  The patient comes in today to follow-up after her PET scan and other labs. I explained that the Sade test showed there is residual cancer DNA in her blood.  She also has an increasing  at 255.  Her LFTs are mildly elevated and her total Bili is up to 1.1.  She has been having severe abdominal pain and diarrhea.  The pain is across the top and bottom of her abdomen. She also has new back pain.  She gets very nauseated every time she eats and has stopped eating much as a result. She has lots 10lbs in 2 months.  She is also experiencing significant anxiety.  Her  recently had a stroke and is coming home from the hospital tomorrow.  She said he has regained a lot of function but she is also been dealing with the stress of taking care of him.  Their daughter has taken over much of their care and is moving into the home.    I reviewed the PET scan images with the patient.  Her daughter was available by phone.  I explained that the area around the pancreas has not significant changed.  She still has ductal dilation and some hyperintensity.  I am very concerned about the findings in the liver.  There is no definitive mass but she has diffuse heterogeneity which sometimes can be a sign of diffuse liver  involvement that is more insidious and highly concerning.  This information along with the patient's recent labs make me very concerned that her liver involvement is extensive.    NM PET/CT Skull Base to Mid Thigh    Result Date: 8/18/2022  PROCEDURE: NM PET SKULL BASE TO MID THIGH  COMPARISON: [facility], CT, ABDOMEN/PELVIS WITH CONTRAST, 3/05/2020, 16:13.  [facility], CT, CT CHEST ABD PEL W CONTRAST, 3/12/2021, 11:18.  [facility], CT, CT CHEST ABD PEL W CONTRAST, 9/08/2020, 15:30.  [facility], CT, CT CHEST ABD PEL W CONTRAST, 5/30/2021, 14:26.  [facility], CT, CT CHEST WO CONTRAST DIAGNOSTIC, 4/13/2022, 10:00.  [facility], CT, CT ABDOMEN PELVIS WO CONTRAST, 6/24/2022, 14:29.  12:11.  [facility], CT, ABDOMEN/PELVIS WITH CONTRAST, 1/17/2020, 0:07.  [facility], PET, NM PET/CT SKULL BASE TO MID THIGH, 6/27/2022, 12:57.  [facility], CT, CT ABDOMEN PELVIS W WO CONTRAST, 4/07/2022, 11:06.  INDICATIONS: pancreatic cancer  TECHNIQUE: After obtaining the patient's consent, F-18 FDG was administered intravenously.  PET/CT imaging was performed from skull to thigh with multi-planar imaging without oral or intravenous contrast material, using a dedicated integrated PET/CT scanner.   RADIONUCLIDE:     11.46 MCI   F18 FDG- I.V. LABS:                          Blood Glucose 188 mg/dl UPTAKE TIME:        56 mins MEDIASTINAL BACKGROUND UPTAKE:  2.4  FINDINGS:  No abnormal activity in the neck.  Right IJ venous catheter is stable terminating in SVC.  There is a small hiatal hernia.  No hypermetabolic mediastinal or hilar adenopathy is seen.  Trace pericardial fluid.  No significant pleural effusion.  On image 83 there is a tiny 3 millimeter nodule in the left lower lobe too small to characterize and stable compared to previous chest CT.  No other nodules seen.  Few calcified nodules likely  granulomas.  Motion artifact in the upper abdomen.  Redemonstration of multiple ventral hernias.  At the level of the iliac crests there is a ventral hernia with increased ascites adjacent to the hernia sac within the abdominal wall.  There is bowel extending through to these hernias.  Findings similar to previous exam.  There is a heterogeneous appearance of liver with areas of hypodensity scattered throughout the liver.  In the left lobe of liver there is a greater amount of hypodensity including a patchy area of hypodensity measuring 8.2 x 3 centimeters in the left lobe.  Previous cholecystectomy.  Postoperative changes in the upper abdomen appears similar.  Right renal cyst similar to previous.  No obstructive uropathy.  Bladder is unremarkable.  The colon is not well-distended.  There may be some areas of colonic thickening versus incomplete distention.  There is mild metabolic activity in the colon especially the distal colon maximum SUV 3.8.  Postoperative changes of the stomach with gastrojejunostomy.  Postoperative changes of the pancreas with resection of the pancreatic head.  Lack of IV contrast limits evaluation.  Compared to prior PET scan there has increased ascites with ascites adjacent to liver, there is mild mesenteric stranding and edema in the upper abdomen and lower abdomen.  There is mild ascites in the pericolic gutters and in the pelvis also increased.  Lack of contrast limits evaluation.  There does appear to be pancreatic ductal dilation.  Focus of activity near the site of previous pancreatic resection with soft tissue demonstrates maximum SUV of 3.1 previously 3.9.  No abnormal osseous activity.          1. Motion artifact in the upper abdomen. 2. Compared to prior PET scan 6/24/2022, there is persistent soft tissue and mild metabolic activity in the region of previous pancreatic head resection with a maximum SUV of 3.1 previously 3.9.  Persistent pancreatic ductal dilation.  Motion  artifact and lack of contrast limits evaluation.  Findings are concerning for local recurrence.  Correlate with lab values.  Recommend follow-up CT with contrast using pancreatic mass protocol. 3. Compare to prior PET scan there is increased abdominal ascites adjacent to the liver, pericolic gutters, and in the pelvis.  There is a small amount of increased fluid in the abdominal wall adjacent to the ventral hernias.  May be increased mesenteric edema. 4. There is a change in the appearance of the liver with a heterogeneous appearance with areas of hypodensity scattered throughout the liver greater in the left lobe and caudate.  There is no focal metabolic activity in the liver but CT with contrast would be useful to further evaluate. 5. Possible areas of colonic thickening greater distally.  There is mild metabolic activity.  Colitis could be in the differential.     VANI EAGLE MD       Electronically Signed and Approved By: VANI EAGLE MD on 8/18/2022 at 15:24               Review of Systems   Constitutional: Positive for unexpected weight loss. Negative for appetite change, diaphoresis, fatigue, fever and unexpected weight gain.   HENT: Negative for hearing loss, mouth sores, sore throat, swollen glands, trouble swallowing and voice change.    Eyes: Negative for blurred vision.   Respiratory: Negative for cough, shortness of breath and wheezing.    Cardiovascular: Negative for chest pain and palpitations.   Gastrointestinal: Positive for abdominal pain, diarrhea, nausea and indigestion. Negative for blood in stool, constipation and vomiting.   Endocrine: Negative for cold intolerance and heat intolerance.   Genitourinary: Negative for difficulty urinating, dysuria, frequency, hematuria and urinary incontinence.   Musculoskeletal: Negative for arthralgias, back pain and myalgias.   Skin: Negative for rash, skin lesions and wound.   Neurological: Positive for weakness. Negative for dizziness, seizures,  numbness and headache.   Hematological: Does not bruise/bleed easily.   Psychiatric/Behavioral: Negative for depressed mood. The patient is nervous/anxious.    All other systems reviewed and are negative.      Past Medical History:   Diagnosis Date   • Diabetes (HCC)    • Essential hypertension    • Gallstones    • Heartburn    • History of pancreatic cancer    • No family history of colorectal cancer    • Pancreatic cancer (HCC)    • Sleep apnea      Past Surgical History:   Procedure Laterality Date   • APPENDECTOMY     • COLONOSCOPY     • ERCP     • FOOT SURGERY     • GALLBLADDER SURGERY     • HERNIA REPAIR     • OOPHORECTOMY     • UPPER GASTROINTESTINAL ENDOSCOPY     • WHIPPLE PROCEDURE       Social History     Socioeconomic History   • Marital status:    Tobacco Use   • Smoking status: Never Smoker   • Smokeless tobacco: Never Used   Vaping Use   • Vaping Use: Never used   Substance and Sexual Activity   • Alcohol use: Never   • Drug use: Never   • Sexual activity: Not Currently     Family History   Problem Relation Age of Onset   • Heart attack Mother    • Diabetes Mother    • Heart disease Mother    • Heart attack Father    • Colon cancer Neg Hx        Objective   Physical Exam  General: Alert, cooperative, no acute distress  Eyes: Anicteric sclera, PERRLA  Respiratory: normal respiratory effort  Cardiovascular: no lower extremity edema  Skin: Normal tone, no rash, no lesions  Psychiatric: Appropriate affect, intact judgment  Neurologic: No focal sensory or motor deficits, normal cognition   Musculoskeletal: Normal muscle strength and tone  Extremities: No clubbing, cyanosis, or deformities    There were no vitals filed for this visit.            PHQ-9 Total Score:         Result Review :   The following data was reviewed by: Marian Vincent MA on 08/19/2022:  Lab Results   Component Value Date    HGB 10.4 (L) 08/10/2022    HCT 32.8 (L) 08/10/2022    MCV 92.1 08/10/2022     (L) 08/10/2022    WBC  4.81 08/10/2022    NEUTROABS 6.4 (H) 08/12/2022    LYMPHSABS 1.37 08/10/2022    MONOSABS 0.41 08/10/2022    EOSABS 0.1 08/12/2022    BASOSABS 0.0 08/12/2022     Lab Results   Component Value Date    GLUCOSE 342 (H) 08/10/2022    BUN 15 08/10/2022    CREATININE 1.01 (H) 08/10/2022     08/10/2022    K 4.4 08/10/2022     08/10/2022    CO2 22.8 08/10/2022    CALCIUM 9.0 08/10/2022    PROTEINTOT 6.6 08/10/2022    ALBUMIN 3.80 08/10/2022    BILITOT 0.4 08/10/2022    ALKPHOS 164 (H) 08/10/2022    AST 73 (H) 08/10/2022    ALT 50 (H) 08/10/2022          Assessment and Plan    There are no diagnoses linked to this encounter.      Patient was given instructions and counseling regarding her condition or for health maintenance advice. Please see specific information pulled into the AVS if appropriate.     Recurrent Metastatic Pancreatic Cancer:  Based on the recent PET scan and labs I suspect the patient has diffuse liver involvement.  I recommend symptom management and treatment ASAP.  I would recommend restarting gemcitabine which she had more than 2 years ago.  I will talk with Dr. Casillas about his plans for feeding access.  She can also speak with him about the diarrhea and other concerns.     Nausea: I will start a 10 day course of prednisone and Ativan 0.25 - 0.5mg twice daily as needed.      Anxiety:  Ativan will also help    Cancer-related pain: Abdominal pain and back pain are directly related to the malignancy.  No evidence of lesions in the spine but the pain is likely referred from her liver involvement.    Diarrhea: Likely secondary to the underlying disease.  She is currently taking Imodium and Creon.  I will discuss adding Lomotil versus other options with Dr. Casillas.    Port malfunction: Previous port study showed the patient had a fibrin sheath along the port.  We will attempt to get the port unblocked.  If it cannot be used in the must be replaced as she has poor venous access.

## 2022-08-22 RX ORDER — ONDANSETRON HYDROCHLORIDE 8 MG/1
8 TABLET, FILM COATED ORAL 3 TIMES DAILY PRN
Qty: 30 TABLET | Refills: 5 | Status: CANCELLED | OUTPATIENT
Start: 2022-08-24

## 2022-08-23 PROBLEM — Z45.2 ENCOUNTER FOR ADJUSTMENT OR MANAGEMENT OF VASCULAR ACCESS DEVICE: Status: ACTIVE | Noted: 2022-08-23

## 2022-08-23 RX ORDER — SODIUM CHLORIDE 9 MG/ML
250 INJECTION, SOLUTION INTRAVENOUS ONCE
Status: CANCELLED | OUTPATIENT
Start: 2022-09-01

## 2022-08-23 RX ORDER — SODIUM CHLORIDE 9 MG/ML
250 INJECTION, SOLUTION INTRAVENOUS ONCE
Status: CANCELLED | OUTPATIENT
Start: 2022-08-25

## 2022-08-24 ENCOUNTER — TELEPHONE (OUTPATIENT)
Dept: ONCOLOGY | Facility: HOSPITAL | Age: 72
End: 2022-08-24

## 2022-08-24 ENCOUNTER — HOSPITAL ENCOUNTER (OUTPATIENT)
Dept: ONCOLOGY | Facility: HOSPITAL | Age: 72
Setting detail: INFUSION SERIES
Discharge: HOME | End: 2022-08-24

## 2022-08-24 ENCOUNTER — NURSE NAVIGATOR (OUTPATIENT)
Dept: ONCOLOGY | Facility: HOSPITAL | Age: 72
End: 2022-08-24

## 2022-08-24 DIAGNOSIS — C25.0 MALIGNANT NEOPLASM OF HEAD OF PANCREAS: Primary | ICD-10-CM

## 2022-08-24 DIAGNOSIS — Z45.2 ENCOUNTER FOR ADJUSTMENT OR MANAGEMENT OF VASCULAR ACCESS DEVICE: ICD-10-CM

## 2022-08-24 LAB
ALBUMIN SERPL-MCNC: 3.45 G/DL (ref 3.5–5.2)
ALBUMIN/GLOB SERPL: 1.6 G/DL
ALP SERPL-CCNC: 122 U/L (ref 39–117)
ALT SERPL W P-5'-P-CCNC: 39 U/L (ref 1–33)
ANION GAP SERPL CALCULATED.3IONS-SCNC: 13.7 MMOL/L (ref 5–15)
AST SERPL-CCNC: 53 U/L (ref 1–32)
BASOPHILS # BLD AUTO: 0 10*3/MM3 (ref 0–0.2)
BASOPHILS NFR BLD AUTO: 0 % (ref 0–1.5)
BILIRUB SERPL-MCNC: 0.6 MG/DL (ref 0–1.2)
BUN SERPL-MCNC: 13 MG/DL (ref 8–23)
BUN/CREAT SERPL: 17.6 (ref 7–25)
CALCIUM SPEC-SCNC: 8.1 MG/DL (ref 8.6–10.5)
CANCER AG19-9 SERPL-ACNC: 277 U/ML
CHLORIDE SERPL-SCNC: 99 MMOL/L (ref 98–107)
CO2 SERPL-SCNC: 18.3 MMOL/L (ref 22–29)
CREAT SERPL-MCNC: 0.74 MG/DL (ref 0.57–1)
DEPRECATED RDW RBC AUTO: 43.5 FL (ref 37–54)
EGFRCR SERPLBLD CKD-EPI 2021: 85.6 ML/MIN/1.73
EOSINOPHIL # BLD AUTO: 0.04 10*3/MM3 (ref 0–0.4)
EOSINOPHIL NFR BLD AUTO: 0.9 % (ref 0.3–6.2)
ERYTHROCYTE [DISTWIDTH] IN BLOOD BY AUTOMATED COUNT: 13.3 % (ref 12.3–15.4)
GLOBULIN UR ELPH-MCNC: 2.2 GM/DL
GLUCOSE SERPL-MCNC: 294 MG/DL (ref 65–99)
HCT VFR BLD AUTO: 30 % (ref 34–46.6)
HGB BLD-MCNC: 10.3 G/DL (ref 12–15.9)
IMM GRANULOCYTES # BLD AUTO: 0.01 10*3/MM3 (ref 0–0.05)
IMM GRANULOCYTES NFR BLD AUTO: 0.2 % (ref 0–0.5)
LDH SERPL-CCNC: 238 U/L (ref 135–214)
LYMPHOCYTES # BLD AUTO: 1.17 10*3/MM3 (ref 0.7–3.1)
LYMPHOCYTES NFR BLD AUTO: 26.5 % (ref 19.6–45.3)
MAGNESIUM SERPL-MCNC: 1.8 MG/DL (ref 1.6–2.4)
MCH RBC QN AUTO: 29.8 PG (ref 26.6–33)
MCHC RBC AUTO-ENTMCNC: 34.3 G/DL (ref 31.5–35.7)
MCV RBC AUTO: 86.7 FL (ref 79–97)
MONOCYTES # BLD AUTO: 0.31 10*3/MM3 (ref 0.1–0.9)
MONOCYTES NFR BLD AUTO: 7 % (ref 5–12)
NEUTROPHILS NFR BLD AUTO: 2.89 10*3/MM3 (ref 1.7–7)
NEUTROPHILS NFR BLD AUTO: 65.4 % (ref 42.7–76)
PLATELET # BLD AUTO: 89 10*3/MM3 (ref 140–450)
PMV BLD AUTO: 9.5 FL (ref 6–12)
POTASSIUM SERPL-SCNC: 2.7 MMOL/L (ref 3.5–5.2)
PROT SERPL-MCNC: 5.6 G/DL (ref 6–8.5)
RBC # BLD AUTO: 3.46 10*6/MM3 (ref 3.77–5.28)
SODIUM SERPL-SCNC: 131 MMOL/L (ref 136–145)
WBC NRBC COR # BLD: 4.42 10*3/MM3 (ref 3.4–10.8)

## 2022-08-24 PROCEDURE — 25010000002 ALTEPLASE 2 MG RECONSTITUTED SOLUTION: Performed by: INTERNAL MEDICINE

## 2022-08-24 PROCEDURE — 85025 COMPLETE CBC W/AUTO DIFF WBC: CPT | Performed by: INTERNAL MEDICINE

## 2022-08-24 PROCEDURE — 86301 IMMUNOASSAY TUMOR CA 19-9: CPT | Performed by: NURSE PRACTITIONER

## 2022-08-24 PROCEDURE — 83735 ASSAY OF MAGNESIUM: CPT | Performed by: INTERNAL MEDICINE

## 2022-08-24 PROCEDURE — 25010000002 HEPARIN LOCK FLUSH PER 10 UNITS: Performed by: INTERNAL MEDICINE

## 2022-08-24 PROCEDURE — 83615 LACTATE (LD) (LDH) ENZYME: CPT | Performed by: NURSE PRACTITIONER

## 2022-08-24 PROCEDURE — 80053 COMPREHEN METABOLIC PANEL: CPT | Performed by: INTERNAL MEDICINE

## 2022-08-24 PROCEDURE — 36593 DECLOT VASCULAR DEVICE: CPT

## 2022-08-24 RX ORDER — HEPARIN SODIUM (PORCINE) LOCK FLUSH IV SOLN 100 UNIT/ML 100 UNIT/ML
500 SOLUTION INTRAVENOUS AS NEEDED
Status: CANCELLED | OUTPATIENT
Start: 2022-08-24

## 2022-08-24 RX ORDER — SODIUM CHLORIDE 0.9 % (FLUSH) 0.9 %
20 SYRINGE (ML) INJECTION AS NEEDED
Status: DISCONTINUED | OUTPATIENT
Start: 2022-08-24 | End: 2022-08-25 | Stop reason: HOSPADM

## 2022-08-24 RX ORDER — SODIUM CHLORIDE 0.9 % (FLUSH) 0.9 %
20 SYRINGE (ML) INJECTION AS NEEDED
Status: CANCELLED | OUTPATIENT
Start: 2022-08-24

## 2022-08-24 RX ORDER — HEPARIN SODIUM (PORCINE) LOCK FLUSH IV SOLN 100 UNIT/ML 100 UNIT/ML
500 SOLUTION INTRAVENOUS AS NEEDED
Status: DISCONTINUED | OUTPATIENT
Start: 2022-08-24 | End: 2022-08-25 | Stop reason: HOSPADM

## 2022-08-24 RX ADMIN — HEPARIN 500 UNITS: 100 SYRINGE at 12:54

## 2022-08-24 RX ADMIN — ALTEPLASE: 2.2 INJECTION, POWDER, LYOPHILIZED, FOR SOLUTION INTRAVENOUS at 08:18

## 2022-08-24 RX ADMIN — Medication 20 ML: at 12:54

## 2022-08-24 RX ADMIN — ALTEPLASE: 2.2 INJECTION, POWDER, LYOPHILIZED, FOR SOLUTION INTRAVENOUS at 10:41

## 2022-08-24 NOTE — TELEPHONE ENCOUNTER
Caller: LIBIA STOCK    Relationship: Emergency Contact    Best call back number: [phone]      What was the call regarding: PATIENTS DAUGHTER CALLED WANTED TO KNOW WHAT THE INFUSION FOR TOMORROW WAS FOR AND SHE MAY NOT BE ABLE TO DO THAT, PATIENT CAN NOT DO EARLY MORNING     Do you require a callback: YES

## 2022-08-24 NOTE — PROGRESS NOTES
Chemotherapy teaching completed today in POD 1 while here for activase.  Patient is familiar with our facility, reminded her of where our bathrooms and refreshment area were located.  Went through what a typical infusion day will look like for her with her particular treatment regimen, including how long to anticipate being here (labs, doctor appointment and infusion time). Informed Ms. Duncan that members of our staff included: Nursing, Medical Assistants, Oncology Social Worker and Dietician.  Financial Counseling available via the hospital.  Patient was encouraged to let us know how we can help her through her process.      Information given:  Chemotherapy and You Booklet was given to patient.   Chemotherapy drug information printed and given to patient from [facility]    Discussed side effects of chemo: Changes in blood work such as anemia, neutropenia, and thrombocytopenia.  Pain, fatigue, nausea, vomiting, diarrhea, constipation and how to manage these symptoms reviewed.  Fever and infection prevention strategies discussed along with when to call the doctor or our nurse triage line.  Number for nurse triage provided.  Encouraged patient to place on her refrigerator door for easy access.  Potential for mouth sores, hair loss, possible skin & nail changes, and nutrition and appetite changes reviewed.  How to manage body fluids also discussed. Patient encouraged to inform her doctor and/or nurse at each infusion or doctor visit if she experiences any of these side effects so that we can have the opportunity to address them.  Recommended patient discuss all home medications (and changes), vitamins and other herbs/supplements with doctor prior to taking. Verified that patient has picked up premedications sent to pharmacy on the 19th.     Patient shared with me that her  had a stroke 3 weeks ago and is back home. They have been  for 54 years.  She informs that she has a 18 yo granddaughter that lives  with them but does not drive.  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 She has a daughter and son-in-law who work during the day but tries to come over to help afterwards. She states that [facility] out to her home to assess some things to help her with her  but they did not show. She is concerned with how she will get back and forth once chemo starts.  also reports that she will be getting a feeding tube in the near future.  I took the opportunity to discuss the option of palliative care as a supportive service to her while she undergoes chemotherapy.  Patient is very interested and requested a palliative care referral.  This history and request for service was communicated to Dr Edgar nurse, Lizeth and our Oncology Social Worker.  Referral placed.     Time was given for patient and/or family member to ask questions and answers to be given.  Future appointments to be given to patient upon discharged today.  Patient reminded to arrive at least 15-20 minutes prior to appointment for registration.    I thanked the patient for her time and the opportunity to care for her.  I encouraged her to reach out to me if I could be of any assistance.

## 2022-08-25 ENCOUNTER — DOCUMENTATION (OUTPATIENT)
Dept: ONCOLOGY | Facility: HOSPITAL | Age: 72
End: 2022-08-25

## 2022-08-25 ENCOUNTER — APPOINTMENT (OUTPATIENT)
Dept: ONCOLOGY | Facility: HOSPITAL | Age: 72
End: 2022-08-25

## 2022-08-25 ENCOUNTER — DOCUMENTATION (OUTPATIENT)
Dept: PHARMACY | Facility: HOSPITAL | Age: 72
End: 2022-08-25

## 2022-08-25 ENCOUNTER — HOSPITAL ENCOUNTER (OUTPATIENT)
Dept: ONCOLOGY | Facility: HOSPITAL | Age: 72
Setting detail: INFUSION SERIES
Discharge: HOME | End: 2022-08-25

## 2022-08-25 VITALS
TEMPERATURE: 97.5 F | SYSTOLIC BLOOD PRESSURE: 140 MMHG | HEART RATE: 65 BPM | OXYGEN SATURATION: 100 % | DIASTOLIC BLOOD PRESSURE: 62 MMHG | WEIGHT: 138.89 LBS | RESPIRATION RATE: 20 BRPM | HEIGHT: 64 IN | BODY MASS INDEX: 23.71 KG/M2

## 2022-08-25 DIAGNOSIS — C25.0 MALIGNANT NEOPLASM OF HEAD OF PANCREAS: ICD-10-CM

## 2022-08-25 DIAGNOSIS — Z45.2 ENCOUNTER FOR ADJUSTMENT OR MANAGEMENT OF VASCULAR ACCESS DEVICE: ICD-10-CM

## 2022-08-25 DIAGNOSIS — C25.0 MALIGNANT NEOPLASM OF HEAD OF PANCREAS: Primary | ICD-10-CM

## 2022-08-25 DIAGNOSIS — E87.6 HYPOKALEMIA: Primary | ICD-10-CM

## 2022-08-25 PROCEDURE — 96413 CHEMO IV INFUSION 1 HR: CPT

## 2022-08-25 PROCEDURE — 96366 THER/PROPH/DIAG IV INF ADDON: CPT

## 2022-08-25 PROCEDURE — 96367 TX/PROPH/DG ADDL SEQ IV INF: CPT

## 2022-08-25 PROCEDURE — 25010000002 HEPARIN LOCK FLUSH PER 10 UNITS: Performed by: INTERNAL MEDICINE

## 2022-08-25 PROCEDURE — 96375 TX/PRO/DX INJ NEW DRUG ADDON: CPT

## 2022-08-25 PROCEDURE — 25010000002 GEMCITABINE 1 GM/26.3ML SOLUTION 26.3 ML VIAL: Performed by: INTERNAL MEDICINE

## 2022-08-25 PROCEDURE — 0 POTASSIUM CHLORIDE 10 MEQ/100ML SOLUTION: Performed by: INTERNAL MEDICINE

## 2022-08-25 PROCEDURE — 25010000002 DEXAMETHASONE SODIUM PHOSPHATE 120 MG/30ML SOLUTION: Performed by: INTERNAL MEDICINE

## 2022-08-25 PROCEDURE — 96365 THER/PROPH/DIAG IV INF INIT: CPT

## 2022-08-25 RX ORDER — SODIUM CHLORIDE 9 MG/ML
250 INJECTION, SOLUTION INTRAVENOUS ONCE
Status: COMPLETED | OUTPATIENT
Start: 2022-08-25 | End: 2022-08-25

## 2022-08-25 RX ORDER — POTASSIUM CHLORIDE 750 MG/1
20 TABLET, EXTENDED RELEASE ORAL DAILY
Qty: 60 TABLET | Refills: 0 | Status: SHIPPED | OUTPATIENT
Start: 2022-08-25

## 2022-08-25 RX ORDER — HEPARIN SODIUM (PORCINE) LOCK FLUSH IV SOLN 100 UNIT/ML 100 UNIT/ML
500 SOLUTION INTRAVENOUS AS NEEDED
Status: CANCELLED | OUTPATIENT
Start: 2022-08-25

## 2022-08-25 RX ORDER — LEVOFLOXACIN 750 MG/1
750 TABLET ORAL DAILY
Qty: 7 TABLET | Refills: 0 | Status: SHIPPED | OUTPATIENT
Start: 2022-08-25 | End: 2022-09-01

## 2022-08-25 RX ORDER — HEPARIN SODIUM (PORCINE) LOCK FLUSH IV SOLN 100 UNIT/ML 100 UNIT/ML
500 SOLUTION INTRAVENOUS AS NEEDED
Status: DISCONTINUED | OUTPATIENT
Start: 2022-08-25 | End: 2022-08-26 | Stop reason: HOSPADM

## 2022-08-25 RX ORDER — SODIUM CHLORIDE 0.9 % (FLUSH) 0.9 %
20 SYRINGE (ML) INJECTION AS NEEDED
Status: DISCONTINUED | OUTPATIENT
Start: 2022-08-25 | End: 2022-08-26 | Stop reason: HOSPADM

## 2022-08-25 RX ORDER — POTASSIUM CHLORIDE 7.45 MG/ML
10 INJECTION INTRAVENOUS
Status: COMPLETED | OUTPATIENT
Start: 2022-08-25 | End: 2022-08-25

## 2022-08-25 RX ORDER — SODIUM CHLORIDE 0.9 % (FLUSH) 0.9 %
20 SYRINGE (ML) INJECTION AS NEEDED
Status: CANCELLED | OUTPATIENT
Start: 2022-08-25

## 2022-08-25 RX ORDER — METRONIDAZOLE 250 MG/1
500 TABLET ORAL 3 TIMES DAILY
Qty: 84 TABLET | Refills: 0 | Status: SHIPPED | OUTPATIENT
Start: 2022-08-25 | End: 2022-09-08

## 2022-08-25 RX ADMIN — POTASSIUM CHLORIDE 10 MEQ: 7.46 INJECTION, SOLUTION INTRAVENOUS at 10:12

## 2022-08-25 RX ADMIN — DEXAMETHASONE SODIUM PHOSPHATE 12 MG: 4 INJECTION, SOLUTION INTRA-ARTICULAR; INTRALESIONAL; INTRAMUSCULAR; INTRAVENOUS; SOFT TISSUE at 09:01

## 2022-08-25 RX ADMIN — SODIUM CHLORIDE 250 ML: 9 INJECTION, SOLUTION INTRAVENOUS at 10:10

## 2022-08-25 RX ADMIN — Medication 20 ML: at 12:35

## 2022-08-25 RX ADMIN — POTASSIUM CHLORIDE 10 MEQ: 7.46 INJECTION, SOLUTION INTRAVENOUS at 11:19

## 2022-08-25 RX ADMIN — SODIUM CHLORIDE 250 ML: 9 INJECTION, SOLUTION INTRAVENOUS at 09:00

## 2022-08-25 RX ADMIN — GEMCITABINE HYDROCHLORIDE 1350 MG: 1 INJECTION, SOLUTION INTRAVENOUS at 09:34

## 2022-08-25 RX ADMIN — HEPARIN 500 UNITS: 100 SYRINGE at 12:36

## 2022-08-25 NOTE — PROGRESS NOTES
Diagnosis: Pancreatic cancer    Reason for referral: New tx regimen/rounding    Comments: OSWs met with pt in [facility] center this morning. Pt presented in a very pleasant mood. Reintroduced myself and discussed OSW role/psychosocial services available. Pt and spouse reside in [place] and have been  for over 50 years. Spouse recently had a stroke and has returned back home from rehab. Spouse is improving and receives in [facility] and [facility]. Pt's 17 year old granddaughter is assisting with spouse's care and attends [employer] virtually. Pt's daughter, son-in-law and granddaughters (ages 17 and 21) will be temporarily moving on their property, building a shed behind their home to assist with pt and spouse's care. Pt provided her own transportation to tx today, however, anticipates she will require assistance in the future. Pt's daughter and son-in-law work during the day and 17 year old granddaughter doesn't drive. Pt reports she has a friend that has offered to assist with her transportation and sit with her during tx as needed. Discussed transportation resources today, including [employer] transportation and the expense of this service. Pt reports she is unable to afford this expense. Discussed opportunity to get pt referred to financial resources to either aid or compensate for this expense, including [employer], [employer], [employer], and [facility]. Pt in agreement to OSW submitting referral to the [place] [facility] today. Pt requested OSW to contact her daughter, Jodie, directly to discuss details of these resources to further assist with facilitating. Provided emotional support throughout, utilizing empathy and validating and normalizing identified emotions. Discussed opportunity for mental health services (counseling,psych) and/or cancer support services ([id number] support, support groups). Pt is currently  prescribed Buspar, Zoloft and Trazodone by PCP. Pt reports she has been on these medications for a long period of time and feels they may need increased. Pt believes her next visit with PCP is on 9/26, however, encouraged pt to contact PCP office to determine if medication adjustments may be made prior to her next visit or if her visit may be scheduled sooner. Pt v/u. Dr. Acharya recently started pt on Ativan for nausea/anxiety. In addition to pt's family mentioned above, pt has a great support system from several neighbors and friends who provide home cooked meals, sit with pt's spouse, pray with them, etc. Pt is the only girl to 7 brothers. Pt is closest with her youngest brother who resides in [place] and is currently in poor health himself. Pt has strong spiritual osiris and gives her glory to God and his plan. Pt hasn't been able to attend [employer] recently due to her health, however, continues to receive spiritual support from her support and [employer] community. Pt and spouse enjoy gardening and tammy. Pt reports she is a great cook. Pt chose a few caps today and was educated that the [facility] is available to her to obtain additional caps/scarves and/or wig. Pt was provided with information regarding palliative care during her chemo education visit yesterday with RN and pt has requested a referral for palliative care. OSW submitted this referral via telephone and fax this afternoon to [facility]. Provided pt with my business card, encouraging OSW support remains available. Pt expressed gratitude.    Update 9/2: Contacted pt's daughter via telephone this afternoon. Daughter reports [facility] is coming to see pt on 9/7/22. Discussed [employer] transportation and financial resources available to assist with the $18 roundtrip expense. Daughter reports if pt's txs can be moved to Wednesdays on her day off, transportation will not be a barrier. OSW consulted with infusion charge nurse and  and was  able to successfully get pt's txs moved to Wednesdays. Notified daughter and provided her with pt's updated tx schedule. Daughter reports pt has been experiencing high anxiety since her recurrence. Discussed with daughter the support opportunity available to pt and suggested pt contact PCP office to make adjustments to current anti-anxiety and depressant medications. Daughter reports she left a message with PCP office inquiring if pt can take ativan and buspar together. Daughter is awaiting return call. Encouraged OSW support remains available. Daughter expressed gratitude.    Services/Referrals Provided: Emotional support; transportation and financial resources - [employer] general public transportation, [place] [facility]; palliative care - [facility]; care coordination - tx schedule

## 2022-08-25 NOTE — PROGRESS NOTES
"Presents for Cycle #1 Day #1 gemcitabine 800 mg/m2 (empiric 20% dose reduction) for pancreatic CA.  Patient was previously treated with gemcitabine regimen about 2 years ago and had no major sequelae to my recollection.    Ht: 162.6 cm  Wt:63 kg  BSA: 1.68 m2    Dose verified using today's parameters and is within acceptable limits of variation and rounding.    Labs from 8/24/22 reviewed.  Of note, PLT=89K which is below the Epic comm parameter to proceed; \"OK to Treat\" order obtained from MD prior to initiating treatment.  Other notable labs include: K+=2.7 (will give 2 x 10 mEq runs today), Ca++=8.1, and mildly elevated hepatic transaminases.    UpToDate patient information printed and provided to patient and key information verbally highlighted including: Overview of regimen including infusion time; Recognition and management of allergic/infusion reactions; N/V management; \"call your doctor right away\" parameters.    All questions were answered in kind and no outstanding issues are noted at this time.  RTC in 1 week for C1D8 with labs prior.    "

## 2022-08-25 NOTE — PROGRESS NOTES
"Outpatient Nutrition Oncology Follow Up    Patient Name: Yvrose Duncan  YOB: 1950  MRN: [id number]  Assessment Date: 8/25/2022    CLINICAL NUTRITION ASSESSMENT  Dx: Metastatic Pancreatic CA      Type of Cancer Treatment  Gemcitabine        CLINICAL NUTRITION ASSESSMENT      Reason for Assessment  Assessment     H&P:    Past Medical History:   Diagnosis Date   • Diabetes (HCC)    • Essential hypertension    • Gallstones    • Heartburn    • History of pancreatic cancer    • No family history of colorectal cancer    • Pancreatic cancer (HCC)    • Sleep apnea         Current Problems:   Patient Active Problem List   Diagnosis Code   • Malignant neoplasm of head of pancreas (HCC) C25.0   • Age-related nuclear cataract, bilateral H25.13   • Anxiety F41.9   • Diabetes mellitus (HCC) E11.9   • Elevated LFTs R79.89   • Exocrine pancreatic insufficiency K86.81   • Gallstones K80.20   • Gram negative septicemia (HCC) A41.50   • Heartburn R12   • Essential (primary) hypertension I10   • Hypomagnesemia E83.42   • Hyponatremia E87.1   • Myopia, bilateral H52.13   • Portal vein thrombosis I81   • Presbyopia H52.4   • Severe sepsis (HCC) A41.9, R65.20   • Sleep apnea G47.30   • Type 2 diabetes mellitus without complication (HCC) E11.9   • Encounter for adjustment or management of vascular access device Z45.2   • History of pancreatic cancer Z85.07   • Hyperlipidemia LDL goal <70 E78.5   • Diverticulitis K57.92   • Encounter for adjustment or management of vascular access device Z45.2   • Encounter for adjustment or management of vascular access device Z45.2   • Hypokalemia E87.6         Anthropometrics     Row Name 08/25/22 1306 08/25/22 0839       Anthropometrics    Height -- 162.6 cm (64.02\")    Weight -- 63 kg (138 lb 14.2 oz)    Weight for Calculation 63 kg (138 lb 14.2 oz) --                BMI kg/m2   Body mass index is 23.83 kg/m².    Weight Hx  Wt Readings from Last 30 Encounters:   08/25/22 0839 63 kg " (138 lb 14.2 oz)   08/19/22 1344 62.4 kg (137 lb 9.1 oz)   07/11/22 1159 73 kg (161 lb)   07/10/22 1935 63.5 kg (140 lb)   07/10/22 0741 63.8 kg (140 lb 10.5 oz)   06/28/22 1131 66.2 kg (145 lb 15.1 oz)   06/24/22 1443 65.4 kg (144 lb 2.9 oz)   06/06/22 1050 66.7 kg (147 lb 0.8 oz)   04/29/22 0844 66.8 kg (147 lb 4.3 oz)   04/28/22 1409 68 kg (150 lb)   04/14/22 1154 66.5 kg (146 lb 9.7 oz)   04/08/22 0953 66.7 kg (147 lb 0.8 oz)   09/21/21 1411 73 kg (160 lb 15 oz)   07/01/21 1036 75.3 kg (166 lb 0.1 oz)   06/17/21 1043 76.1 kg (167 lb 12.8 oz)   04/12/21 0000 79.2 kg (174 lb 8 oz)   04/01/21 0000 77.6 kg (171 lb)   03/26/21 0832 97.5 kg (214 lb 15.2 oz)   12/17/20 0914 70.5 kg (155 lb 6.8 oz)   09/15/20 0000 67.7 kg (149 lb 4 oz)   09/14/20 1148 67.5 kg (148 lb 13 oz)   08/24/20 1043 68.9 kg (151 lb 14.4 oz)   08/10/20 1026 67.3 kg (148 lb 5.9 oz)   07/27/20 1033 67.8 kg (149 lb 7.6 oz)   07/13/20 1013 68.4 kg (150 lb 12.7 oz)   06/29/20 1048 68.6 kg (151 lb 3.8 oz)   06/15/20 1011 70.6 kg (155 lb 10.3 oz)   05/29/20 0910 71.1 kg (156 lb 12 oz)   05/22/20 0911 72 kg (158 lb 11.7 oz)   05/14/20 0931 72.3 kg (159 lb 6.3 oz)        Labs/Medications        Pertinent Labs Reviewed.   Results from last 7 days   Lab Units 08/24/22  1251   SODIUM mmol/L 131*   POTASSIUM mmol/L 2.7*   CHLORIDE mmol/L 99   CO2 mmol/L 18.3*   BUN mg/dL 13   CREATININE mg/dL 0.74   CALCIUM mg/dL 8.1*   BILIRUBIN mg/dL 0.6   ALK PHOS U/L 122*   ALT (SGPT) U/L 39*   AST (SGOT) U/L 53*   GLUCOSE mg/dL 294*     Results from last 7 days   Lab Units 08/24/22  1251   MAGNESIUM mg/dL 1.8   HEMOGLOBIN g/dL 10.3*   HEMATOCRIT % 30.0*     Coronavirus (COVID-19)   Date Value Ref Range Status   10/22/2020 NOT DETECTED NA Final     Comment:     The SARS-CoV-2 assay is a real-time, RT-PCR test intended  for the qualitative detection of nucleic acid from the  SARS-CoV-2 in respiratory specimens from individuals,  testing performed at [facility]  reference lab.       Lab Results   Component Value Date    HGBA1C 10.6 (H) 02/18/2022         Pertinent Medications BASAGLAR KWIKPEN, Dulaglutide, LORazepam, Magnesium, Turmeric, apixaban, atorvastatin, busPIRone, carvedilol, cyclobenzaprine, dapagliflozin, famotidine, fluconazole, fluticasone, glucose blood, insulin aspart, insulin detemir, levoFLOXacin, lisinopril-hydrochlorothiazide, loratadine, metFORMIN, metroNIDAZOLE, montelukast, nitrofurantoin (macrocrystal-monohydrate), omeprazole, ondansetron ODT, oxyCODONE, pancrelipase (Lip-Prot-Amyl), potassium chloride, prazosin, predniSONE, sertraline, sodium chloride, and traZODone     Physical Findings        Malnutrition Severity Assessment     Row Name 08/25/22 1306          Malnutrition Severity Assessment    Malnutrition Type Chronic Disease - Related Malnutrition            Muscle Loss    Temple Region Moderate - slight depression     Clavicle Bone Region Severe - protruding prominent bone            Fat Loss    Orbital Region  Moderate -  somewhat hollowness, slightly dark circles                  Current Nutrition Orders & Evaluation of Intake       Oral Nutrition     Current PO Diet 3 meals/day, occasional snacks, fair protein   Supplement      Enteral Nutrition     Current Formula    Schedule      Parenteral Nutrition     Current Prescription    Schedule      Nutrition Diagnosis        Nutrition Dx Problem 1 Severe malnutrition related to increased nutrient needs due to catabolic disease as evidenced by muscle wasting. and fat loss.       Nutrition Intervention       RD [facility] Nutritional Counseling  Written Materials: Nausea and Vomiting, Diarrhea, High Calorie / High Protein Diet, Oral Care, Smaller meal ideas  Nutrition F/U     Monitor/Evaluation       Monitor Per oncology nutrition protocol.     Comments:  Spoke with pt during infusion. Current weight indicates overall weight loss of 4.8% X 2-3 months with some weight fluctuations during that time. Pt  is experiencing significant diarrhea and N/V. She is s/p Whipple in 2020 and has been taking Creon / pancreatic enzymes for years now. Per review of pt's 24 hour food recall, pt consumes little protein and moderate fat intake. She also consumes snacks such as peanut butter and crackers or cheese and crackers and is not taking enzymes with this. Encouraged pt to speak with MD regarding this, but explained use of pancreatic enzymes. Provided pt with information regarding pancreatic enzymes, reason for use, s/sx of steatorrhea, etc. Pt verbalized understanding and does report tan colored stools but wasn't sure if stools appeared oily. When asked if she has spoken with MD regarding this, pt confirmed she has. Would recommend considering a fecal fat study / test to ensure pt is not experiencing steatorrhea / malabsorption. Pt also reports plan for feeding tube consult to aid nutrient intake. Please see EN recommendations below (this will meet 100% of nutrient needs, however, pt is still consuming PO). Pt has previously had a feeding tube but no longer has this. Reviewed possible side effects from tx that may impact nutrition status and appropriate MNT. Written Materials provided.     Recs: Consider fecal fat test / study if indicated given pt report of stool appearance.     If EN desired: Initiate Impact Peptide 1.5, (depending on what feeding tube is placed), infuse 5.5 containers a day to meet 100% of needs (provides: 2063 kcal, 130 gm of protein, and 1062 ml of free water per day).     Estimated Needs:   9127-6220 kcal/day (30-35 kcal/kg/day)  113-126 gm/day (1.8-2.0 gm/kg/day) of protein  5483-2099 ml of free water per day (1 ml/kcal)    Electronically signed by:  Malia Downey RD  08/25/22 13:07 EDT

## 2022-09-01 ENCOUNTER — HOSPITAL ENCOUNTER (OUTPATIENT)
Dept: ONCOLOGY | Facility: HOSPITAL | Age: 72
Setting detail: INFUSION SERIES
Discharge: HOME | End: 2022-09-01

## 2022-09-01 ENCOUNTER — DOCUMENTATION (OUTPATIENT)
Dept: ONCOLOGY | Facility: HOSPITAL | Age: 72
End: 2022-09-01

## 2022-09-01 VITALS
SYSTOLIC BLOOD PRESSURE: 112 MMHG | WEIGHT: 138.23 LBS | HEART RATE: 74 BPM | OXYGEN SATURATION: 100 % | RESPIRATION RATE: 18 BRPM | TEMPERATURE: 97.7 F | BODY MASS INDEX: 23.71 KG/M2 | DIASTOLIC BLOOD PRESSURE: 63 MMHG

## 2022-09-01 DIAGNOSIS — R30.0 DYSURIA: Primary | ICD-10-CM

## 2022-09-01 DIAGNOSIS — R30.0 DYSURIA: ICD-10-CM

## 2022-09-01 DIAGNOSIS — C25.0 MALIGNANT NEOPLASM OF HEAD OF PANCREAS: Primary | ICD-10-CM

## 2022-09-01 DIAGNOSIS — Z45.2 ENCOUNTER FOR ADJUSTMENT OR MANAGEMENT OF VASCULAR ACCESS DEVICE: ICD-10-CM

## 2022-09-01 LAB
ALBUMIN SERPL-MCNC: 3.6 G/DL (ref 3.5–5.2)
ALBUMIN/GLOB SERPL: 1.6 G/DL
ALP SERPL-CCNC: 116 U/L (ref 39–117)
ALT SERPL W P-5'-P-CCNC: 35 U/L (ref 1–33)
ANION GAP SERPL CALCULATED.3IONS-SCNC: 12.6 MMOL/L (ref 5–15)
AST SERPL-CCNC: 36 U/L (ref 1–32)
BACTERIA UR QL AUTO: ABNORMAL /HPF
BASOPHILS # BLD AUTO: 0 10*3/MM3 (ref 0–0.2)
BASOPHILS NFR BLD AUTO: 0 % (ref 0–1.5)
BILIRUB SERPL-MCNC: 0.7 MG/DL (ref 0–1.2)
BILIRUB UR QL STRIP: NEGATIVE
BUN SERPL-MCNC: 14 MG/DL (ref 8–23)
BUN/CREAT SERPL: 14.3 (ref 7–25)
CALCIUM SPEC-SCNC: 9.1 MG/DL (ref 8.6–10.5)
CANCER AG19-9 SERPL-ACNC: 345 U/ML
CHLORIDE SERPL-SCNC: 95 MMOL/L (ref 98–107)
CLARITY UR: CLEAR
CO2 SERPL-SCNC: 22.4 MMOL/L (ref 22–29)
COLOR UR: YELLOW
CREAT SERPL-MCNC: 0.98 MG/DL (ref 0.57–1)
DEPRECATED RDW RBC AUTO: 39.6 FL (ref 37–54)
EGFRCR SERPLBLD CKD-EPI 2021: 61.1 ML/MIN/1.73
EOSINOPHIL # BLD AUTO: 0.02 10*3/MM3 (ref 0–0.4)
EOSINOPHIL NFR BLD AUTO: 0.4 % (ref 0.3–6.2)
ERYTHROCYTE [DISTWIDTH] IN BLOOD BY AUTOMATED COUNT: 13 % (ref 12.3–15.4)
GLOBULIN UR ELPH-MCNC: 2.3 GM/DL
GLUCOSE SERPL-MCNC: 272 MG/DL (ref 65–99)
GLUCOSE UR STRIP-MCNC: NEGATIVE MG/DL
HCT VFR BLD AUTO: 30.9 % (ref 34–46.6)
HGB BLD-MCNC: 10.8 G/DL (ref 12–15.9)
HGB UR QL STRIP.AUTO: NEGATIVE
HYALINE CASTS UR QL AUTO: ABNORMAL /LPF
IMM GRANULOCYTES # BLD AUTO: 0.03 10*3/MM3 (ref 0–0.05)
IMM GRANULOCYTES NFR BLD AUTO: 0.6 % (ref 0–0.5)
KETONES UR QL STRIP: NEGATIVE
LEUKOCYTE ESTERASE UR QL STRIP.AUTO: NEGATIVE
LYMPHOCYTES # BLD AUTO: 1.69 10*3/MM3 (ref 0.7–3.1)
LYMPHOCYTES NFR BLD AUTO: 34.2 % (ref 19.6–45.3)
MCH RBC QN AUTO: 29.3 PG (ref 26.6–33)
MCHC RBC AUTO-ENTMCNC: 35 G/DL (ref 31.5–35.7)
MCV RBC AUTO: 83.7 FL (ref 79–97)
MONOCYTES # BLD AUTO: 0.35 10*3/MM3 (ref 0.1–0.9)
MONOCYTES NFR BLD AUTO: 7.1 % (ref 5–12)
NEUTROPHILS NFR BLD AUTO: 2.85 10*3/MM3 (ref 1.7–7)
NEUTROPHILS NFR BLD AUTO: 57.7 % (ref 42.7–76)
NITRITE UR QL STRIP: NEGATIVE
NRBC BLD AUTO-RTO: 0 /100 WBC (ref 0–0.2)
PH UR STRIP.AUTO: 8 [PH] (ref 5–8)
PLATELET # BLD AUTO: 75 10*3/MM3 (ref 140–450)
PMV BLD AUTO: 10 FL (ref 6–12)
POTASSIUM SERPL-SCNC: 3.9 MMOL/L (ref 3.5–5.2)
PROT SERPL-MCNC: 5.9 G/DL (ref 6–8.5)
PROT UR QL STRIP: NEGATIVE
RBC # BLD AUTO: 3.69 10*6/MM3 (ref 3.77–5.28)
RBC # UR STRIP: ABNORMAL /HPF
REF LAB TEST METHOD: ABNORMAL
SODIUM SERPL-SCNC: 130 MMOL/L (ref 136–145)
SP GR UR STRIP: 1.01 (ref 1–1.03)
SQUAMOUS #/AREA URNS HPF: ABNORMAL /HPF
UROBILINOGEN UR QL STRIP: NORMAL
WBC # UR STRIP: ABNORMAL /HPF
WBC NRBC COR # BLD: 4.94 10*3/MM3 (ref 3.4–10.8)

## 2022-09-01 PROCEDURE — 25010000002 HEPARIN LOCK FLUSH PER 10 UNITS: Performed by: INTERNAL MEDICINE

## 2022-09-01 PROCEDURE — 85025 COMPLETE CBC W/AUTO DIFF WBC: CPT | Performed by: INTERNAL MEDICINE

## 2022-09-01 PROCEDURE — 86301 IMMUNOASSAY TUMOR CA 19-9: CPT | Performed by: INTERNAL MEDICINE

## 2022-09-01 PROCEDURE — 25010000002 GEMCITABINE 1 GM/26.3ML SOLUTION 26.3 ML VIAL: Performed by: INTERNAL MEDICINE

## 2022-09-01 PROCEDURE — 81001 URINALYSIS AUTO W/SCOPE: CPT | Performed by: INTERNAL MEDICINE

## 2022-09-01 PROCEDURE — 96413 CHEMO IV INFUSION 1 HR: CPT

## 2022-09-01 PROCEDURE — 25010000002 DEXAMETHASONE SODIUM PHOSPHATE 120 MG/30ML SOLUTION: Performed by: INTERNAL MEDICINE

## 2022-09-01 PROCEDURE — 80053 COMPREHEN METABOLIC PANEL: CPT | Performed by: INTERNAL MEDICINE

## 2022-09-01 PROCEDURE — 96375 TX/PRO/DX INJ NEW DRUG ADDON: CPT

## 2022-09-01 RX ORDER — SODIUM CHLORIDE 0.9 % (FLUSH) 0.9 %
20 SYRINGE (ML) INJECTION AS NEEDED
Status: CANCELLED | OUTPATIENT
Start: 2022-09-01

## 2022-09-01 RX ORDER — HEPARIN SODIUM (PORCINE) LOCK FLUSH IV SOLN 100 UNIT/ML 100 UNIT/ML
500 SOLUTION INTRAVENOUS AS NEEDED
Status: CANCELLED | OUTPATIENT
Start: 2022-09-01

## 2022-09-01 RX ORDER — HEPARIN SODIUM (PORCINE) LOCK FLUSH IV SOLN 100 UNIT/ML 100 UNIT/ML
500 SOLUTION INTRAVENOUS AS NEEDED
Status: DISCONTINUED | OUTPATIENT
Start: 2022-09-01 | End: 2022-09-02 | Stop reason: HOSPADM

## 2022-09-01 RX ORDER — SODIUM CHLORIDE 0.9 % (FLUSH) 0.9 %
20 SYRINGE (ML) INJECTION AS NEEDED
Status: DISCONTINUED | OUTPATIENT
Start: 2022-09-01 | End: 2022-09-02 | Stop reason: HOSPADM

## 2022-09-01 RX ORDER — SODIUM CHLORIDE 9 MG/ML
250 INJECTION, SOLUTION INTRAVENOUS ONCE
Status: COMPLETED | OUTPATIENT
Start: 2022-09-01 | End: 2022-09-01

## 2022-09-01 RX ADMIN — Medication 20 ML: at 12:23

## 2022-09-01 RX ADMIN — HEPARIN 500 UNITS: 100 SYRINGE at 12:23

## 2022-09-01 RX ADMIN — GEMCITABINE HYDROCHLORIDE 1350 MG: 1 INJECTION, SOLUTION INTRAVENOUS at 11:46

## 2022-09-01 RX ADMIN — DEXAMETHASONE SODIUM PHOSPHATE 12 MG: 4 INJECTION, SOLUTION INTRA-ARTICULAR; INTRALESIONAL; INTRAMUSCULAR; INTRAVENOUS; SOFT TISSUE at 11:27

## 2022-09-01 RX ADMIN — SODIUM CHLORIDE 250 ML: 9 INJECTION, SOLUTION INTRAVENOUS at 11:27

## 2022-09-13 RX ORDER — SODIUM CHLORIDE 9 MG/ML
250 INJECTION, SOLUTION INTRAVENOUS ONCE
OUTPATIENT
Start: 2022-10-19

## 2022-09-13 RX ORDER — SODIUM CHLORIDE 9 MG/ML
250 INJECTION, SOLUTION INTRAVENOUS ONCE
Status: CANCELLED | OUTPATIENT
Start: 2022-09-21

## 2022-09-14 ENCOUNTER — HOSPITAL ENCOUNTER (OUTPATIENT)
Dept: ONCOLOGY | Facility: HOSPITAL | Age: 72
Setting detail: INFUSION SERIES
Discharge: HOME | End: 2022-09-14

## 2022-09-14 ENCOUNTER — OFFICE VISIT (OUTPATIENT)
Dept: ONCOLOGY | Facility: HOSPITAL | Age: 72
End: 2022-09-14

## 2022-09-14 VITALS
BODY MASS INDEX: 23.49 KG/M2 | DIASTOLIC BLOOD PRESSURE: 61 MMHG | TEMPERATURE: 98.7 F | HEART RATE: 92 BPM | RESPIRATION RATE: 18 BRPM | WEIGHT: 136.91 LBS | SYSTOLIC BLOOD PRESSURE: 126 MMHG | OXYGEN SATURATION: 100 %

## 2022-09-14 VITALS
RESPIRATION RATE: 20 BRPM | TEMPERATURE: 99.2 F | OXYGEN SATURATION: 100 % | SYSTOLIC BLOOD PRESSURE: 107 MMHG | WEIGHT: 136.91 LBS | HEART RATE: 85 BPM | HEIGHT: 64 IN | BODY MASS INDEX: 23.37 KG/M2 | DIASTOLIC BLOOD PRESSURE: 56 MMHG

## 2022-09-14 DIAGNOSIS — D64.9 ANEMIA, UNSPECIFIED TYPE: ICD-10-CM

## 2022-09-14 DIAGNOSIS — R11.2 NAUSEA AND VOMITING, UNSPECIFIED VOMITING TYPE: ICD-10-CM

## 2022-09-14 DIAGNOSIS — C25.0 MALIGNANT NEOPLASM OF HEAD OF PANCREAS: Primary | ICD-10-CM

## 2022-09-14 DIAGNOSIS — R63.4 ABNORMAL WEIGHT LOSS: ICD-10-CM

## 2022-09-14 DIAGNOSIS — Z85.07 HISTORY OF PANCREATIC CANCER: ICD-10-CM

## 2022-09-14 DIAGNOSIS — Z45.2 ENCOUNTER FOR ADJUSTMENT OR MANAGEMENT OF VASCULAR ACCESS DEVICE: ICD-10-CM

## 2022-09-14 DIAGNOSIS — G89.3 CANCER ASSOCIATED PAIN: ICD-10-CM

## 2022-09-14 DIAGNOSIS — E86.0 DEHYDRATION: ICD-10-CM

## 2022-09-14 LAB
ALBUMIN SERPL-MCNC: 3.4 G/DL (ref 3.5–5.2)
ALBUMIN/GLOB SERPL: 1.3 G/DL
ALP SERPL-CCNC: 135 U/L (ref 39–117)
ALT SERPL W P-5'-P-CCNC: 35 U/L (ref 1–33)
ANION GAP SERPL CALCULATED.3IONS-SCNC: 14 MMOL/L (ref 5–15)
AST SERPL-CCNC: 46 U/L (ref 1–32)
BASOPHILS # BLD AUTO: 0.01 10*3/MM3 (ref 0–0.2)
BASOPHILS NFR BLD AUTO: 0.2 % (ref 0–1.5)
BILIRUB SERPL-MCNC: 0.8 MG/DL (ref 0–1.2)
BUN SERPL-MCNC: 13 MG/DL (ref 8–23)
BUN/CREAT SERPL: 10.9 (ref 7–25)
CALCIUM SPEC-SCNC: 8.3 MG/DL (ref 8.6–10.5)
CHLORIDE SERPL-SCNC: 99 MMOL/L (ref 98–107)
CO2 SERPL-SCNC: 18 MMOL/L (ref 22–29)
CREAT SERPL-MCNC: 1.19 MG/DL (ref 0.57–1)
DEPRECATED RDW RBC AUTO: 43.2 FL (ref 37–54)
EGFRCR SERPLBLD CKD-EPI 2021: 48.4 ML/MIN/1.73
EOSINOPHIL # BLD AUTO: 0.01 10*3/MM3 (ref 0–0.4)
EOSINOPHIL NFR BLD AUTO: 0.2 % (ref 0.3–6.2)
ERYTHROCYTE [DISTWIDTH] IN BLOOD BY AUTOMATED COUNT: 14.6 % (ref 12.3–15.4)
FERRITIN SERPL-MCNC: 590.8 NG/ML (ref 13–150)
GLOBULIN UR ELPH-MCNC: 2.6 GM/DL
GLUCOSE SERPL-MCNC: 352 MG/DL (ref 65–99)
HCT VFR BLD AUTO: 27.4 % (ref 34–46.6)
HGB BLD-MCNC: 9.4 G/DL (ref 12–15.9)
IMM GRANULOCYTES # BLD AUTO: 0.04 10*3/MM3 (ref 0–0.05)
IMM GRANULOCYTES NFR BLD AUTO: 1 % (ref 0–0.5)
IRON 24H UR-MRATE: 59 MCG/DL (ref 37–145)
IRON SATN MFR SERPL: 23 % (ref 20–50)
LYMPHOCYTES # BLD AUTO: 0.49 10*3/MM3 (ref 0.7–3.1)
LYMPHOCYTES NFR BLD AUTO: 12 % (ref 19.6–45.3)
MCH RBC QN AUTO: 29.9 PG (ref 26.6–33)
MCHC RBC AUTO-ENTMCNC: 34.3 G/DL (ref 31.5–35.7)
MCV RBC AUTO: 87.3 FL (ref 79–97)
MONOCYTES # BLD AUTO: 0.25 10*3/MM3 (ref 0.1–0.9)
MONOCYTES NFR BLD AUTO: 6.1 % (ref 5–12)
NEUTROPHILS NFR BLD AUTO: 3.29 10*3/MM3 (ref 1.7–7)
NEUTROPHILS NFR BLD AUTO: 80.5 % (ref 42.7–76)
NRBC BLD AUTO-RTO: 0 /100 WBC (ref 0–0.2)
PLATELET # BLD AUTO: 233 10*3/MM3 (ref 140–450)
PMV BLD AUTO: 10 FL (ref 6–12)
POTASSIUM SERPL-SCNC: 4.2 MMOL/L (ref 3.5–5.2)
PROT SERPL-MCNC: 6 G/DL (ref 6–8.5)
RBC # BLD AUTO: 3.14 10*6/MM3 (ref 3.77–5.28)
SODIUM SERPL-SCNC: 131 MMOL/L (ref 136–145)
TIBC SERPL-MCNC: 259 MCG/DL (ref 298–536)
TRANSFERRIN SERPL-MCNC: 174 MG/DL (ref 200–360)
WBC NRBC COR # BLD: 4.09 10*3/MM3 (ref 3.4–10.8)

## 2022-09-14 PROCEDURE — 99215 OFFICE O/P EST HI 40 MIN: CPT | Performed by: INTERNAL MEDICINE

## 2022-09-14 PROCEDURE — 80053 COMPREHEN METABOLIC PANEL: CPT | Performed by: INTERNAL MEDICINE

## 2022-09-14 PROCEDURE — 84466 ASSAY OF TRANSFERRIN: CPT | Performed by: INTERNAL MEDICINE

## 2022-09-14 PROCEDURE — 96360 HYDRATION IV INFUSION INIT: CPT

## 2022-09-14 PROCEDURE — 85025 COMPLETE CBC W/AUTO DIFF WBC: CPT | Performed by: INTERNAL MEDICINE

## 2022-09-14 PROCEDURE — G0463 HOSPITAL OUTPT CLINIC VISIT: HCPCS | Performed by: INTERNAL MEDICINE

## 2022-09-14 PROCEDURE — 83540 ASSAY OF IRON: CPT | Performed by: INTERNAL MEDICINE

## 2022-09-14 PROCEDURE — 82728 ASSAY OF FERRITIN: CPT | Performed by: INTERNAL MEDICINE

## 2022-09-14 PROCEDURE — 25010000002 HEPARIN LOCK FLUSH PER 10 UNITS: Performed by: INTERNAL MEDICINE

## 2022-09-14 RX ORDER — SODIUM CHLORIDE 0.9 % (FLUSH) 0.9 %
20 SYRINGE (ML) INJECTION AS NEEDED
Status: CANCELLED | OUTPATIENT
Start: 2022-09-14

## 2022-09-14 RX ORDER — ALPRAZOLAM 0.25 MG/1
0.25 TABLET ORAL 2 TIMES DAILY PRN
COMMUNITY
Start: 2022-09-08

## 2022-09-14 RX ORDER — MORPHINE SULFATE 15 MG/1
15 TABLET, FILM COATED, EXTENDED RELEASE ORAL 2 TIMES DAILY
Qty: 60 TABLET | Refills: 0 | Status: SHIPPED | OUTPATIENT
Start: 2022-09-14

## 2022-09-14 RX ORDER — NALOXONE HYDROCHLORIDE 4 MG/.1ML
1 SPRAY NASAL AS NEEDED
COMMUNITY
Start: 2022-08-19

## 2022-09-14 RX ORDER — OLANZAPINE 5 MG/1
5 TABLET ORAL NIGHTLY
Qty: 30 TABLET | Refills: 3 | Status: SHIPPED | OUTPATIENT
Start: 2022-09-14

## 2022-09-14 RX ORDER — HEPARIN SODIUM (PORCINE) LOCK FLUSH IV SOLN 100 UNIT/ML 100 UNIT/ML
500 SOLUTION INTRAVENOUS AS NEEDED
Status: DISCONTINUED | OUTPATIENT
Start: 2022-09-14 | End: 2022-09-15 | Stop reason: HOSPADM

## 2022-09-14 RX ORDER — HEPARIN SODIUM (PORCINE) LOCK FLUSH IV SOLN 100 UNIT/ML 100 UNIT/ML
500 SOLUTION INTRAVENOUS AS NEEDED
Status: CANCELLED | OUTPATIENT
Start: 2022-09-14

## 2022-09-14 RX ORDER — SODIUM CHLORIDE 0.9 % (FLUSH) 0.9 %
20 SYRINGE (ML) INJECTION AS NEEDED
Status: DISCONTINUED | OUTPATIENT
Start: 2022-09-14 | End: 2022-09-15 | Stop reason: HOSPADM

## 2022-09-14 RX ADMIN — Medication 20 ML: at 16:35

## 2022-09-14 RX ADMIN — HEPARIN SODIUM (PORCINE) LOCK FLUSH IV SOLN 100 UNIT/ML 500 UNITS: 100 SOLUTION at 16:35

## 2022-09-14 RX ADMIN — SODIUM CHLORIDE 1000 ML: 9 INJECTION, SOLUTION INTRAVENOUS at 15:25

## 2022-09-14 NOTE — PROGRESS NOTES
"Outpatient Nutrition Oncology Follow Up    Patient Name: Yvrose Duncan  YOB: 1950  MRN: [id number]  Assessment Date: 9/14/2022    CLINICAL NUTRITION ASSESSMENT  Dx: Pancreatic CA      Type of Cancer Treatment  Gemcitabine       CLINICAL NUTRITION ASSESSMENT      Reason for Assessment  Follow-up protocol     H&P:    Past Medical History:   Diagnosis Date   • Diabetes (HCC)    • Essential hypertension    • Gallstones    • Heartburn    • History of pancreatic cancer    • No family history of colorectal cancer    • Pancreatic cancer (HCC)    • Sleep apnea         Current Problems:   Patient Active Problem List   Diagnosis Code   • Malignant neoplasm of head of pancreas (HCC) C25.0   • Age-related nuclear cataract, bilateral H25.13   • Anxiety F41.9   • Diabetes mellitus (HCC) E11.9   • Elevated LFTs R79.89   • Exocrine pancreatic insufficiency K86.81   • Gallstones K80.20   • Gram negative septicemia (HCC) A41.50   • Heartburn R12   • Essential (primary) hypertension I10   • Hypomagnesemia E83.42   • Hyponatremia E87.1   • Myopia, bilateral H52.13   • Portal vein thrombosis I81   • Presbyopia H52.4   • Severe sepsis (HCC) A41.9, R65.20   • Sleep apnea G47.30   • Type 2 diabetes mellitus without complication (HCC) E11.9   • Encounter for adjustment or management of vascular access device Z45.2   • History of pancreatic cancer Z85.07   • Hyperlipidemia LDL goal <70 E78.5   • Diverticulitis K57.92   • Encounter for adjustment or management of vascular access device Z45.2   • Encounter for adjustment or management of vascular access device Z45.2   • Hypokalemia E87.6   • Dehydration E86.0         Anthropometrics     Row Name 09/14/22 1335          Anthropometrics    Height 162.6 cm (64.02\")     Weight 62.1 kg (136 lb 14.5 oz)                 BMI kg/m2   Body mass index is 23.49 kg/m².    Weight Hx  Wt Readings from Last 30 Encounters:   09/14/22 1418 62.1 kg (136 lb 14.5 oz)   09/14/22 1335 62.1 kg (136 lb " 14.5 oz)   09/01/22 0841 62.7 kg (138 lb 3.7 oz)   08/25/22 0839 63 kg (138 lb 14.2 oz)   08/19/22 1344 62.4 kg (137 lb 9.1 oz)   07/11/22 1159 73 kg (161 lb)   07/10/22 1935 63.5 kg (140 lb)   07/10/22 0741 63.8 kg (140 lb 10.5 oz)   06/28/22 1131 66.2 kg (145 lb 15.1 oz)   06/24/22 1443 65.4 kg (144 lb 2.9 oz)   06/06/22 1050 66.7 kg (147 lb 0.8 oz)   04/29/22 0844 66.8 kg (147 lb 4.3 oz)   04/28/22 1409 68 kg (150 lb)   04/14/22 1154 66.5 kg (146 lb 9.7 oz)   04/08/22 0953 66.7 kg (147 lb 0.8 oz)   09/21/21 1411 73 kg (160 lb 15 oz)   07/01/21 1036 75.3 kg (166 lb 0.1 oz)   06/17/21 1043 76.1 kg (167 lb 12.8 oz)   04/12/21 0000 79.2 kg (174 lb 8 oz)   04/01/21 0000 77.6 kg (171 lb)   03/26/21 0832 97.5 kg (214 lb 15.2 oz)   12/17/20 0914 70.5 kg (155 lb 6.8 oz)   09/15/20 0000 67.7 kg (149 lb 4 oz)   09/14/20 1148 67.5 kg (148 lb 13 oz)   08/24/20 1043 68.9 kg (151 lb 14.4 oz)   08/10/20 1026 67.3 kg (148 lb 5.9 oz)   07/27/20 1033 67.8 kg (149 lb 7.6 oz)   07/13/20 1013 68.4 kg (150 lb 12.7 oz)   06/29/20 1048 68.6 kg (151 lb 3.8 oz)   06/15/20 1011 70.6 kg (155 lb 10.3 oz)        Labs/Medications        Pertinent Labs Reviewed.   Results from last 7 days   Lab Units 09/14/22  1325   SODIUM mmol/L 131*   POTASSIUM mmol/L 4.2   CHLORIDE mmol/L 99   CO2 mmol/L 18.0*   BUN mg/dL 13   CREATININE mg/dL 1.19*   CALCIUM mg/dL 8.3*   BILIRUBIN mg/dL 0.8   ALK PHOS U/L 135*   ALT (SGPT) U/L 35*   AST (SGOT) U/L 46*   GLUCOSE mg/dL 352*     Results from last 7 days   Lab Units 09/14/22  1325   HEMOGLOBIN g/dL 9.4*   HEMATOCRIT % 27.4*     Coronavirus (COVID-19)   Date Value Ref Range Status   10/22/2020 NOT DETECTED NA Final     Comment:     The SARS-CoV-2 assay is a real-time, RT-PCR test intended  for the qualitative detection of nucleic acid from the  SARS-CoV-2 in respiratory specimens from individuals,  testing performed at [facility] reference lab.       Lab Results   Component Value Date    HGBA1C 10.6 (H)  02/18/2022         Pertinent Medications ALPRAZolam, BASAGLAR KWIKPEN, Dulaglutide, LORazepam, Magnesium, OLANZapine, Turmeric, apixaban, atorvastatin, busPIRone, carvedilol, cyclobenzaprine, dapagliflozin, famotidine, fluconazole, fluticasone, glucose blood, insulin aspart, insulin detemir, lisinopril-hydrochlorothiazide, loratadine, metFORMIN, montelukast, naloxone, nitrofurantoin (macrocrystal-monohydrate), omeprazole, ondansetron ODT, oxyCODONE, pancrelipase (Lip-Prot-Amyl), potassium chloride, prazosin, predniSONE, sertraline, sodium chloride, and traZODone     Physical Findings        Malnutrition Severity Assessment     Row Name 09/14/22 1532          Malnutrition Severity Assessment    Malnutrition Type Chronic Disease - Related Malnutrition            Muscle Loss    Temple Region Moderate - slight depression     Clavicle Bone Region Severe - protruding prominent bone            Fat Loss    Orbital Region  Moderate -  somewhat hollowness, slightly dark circles                  Current Nutrition Orders & Evaluation of Intake       Oral Nutrition     Current PO Diet 5 smaller meals/day, good protein sources   Supplement      Enteral Nutrition     Current Formula    Schedule      Parenteral Nutrition     Current Prescription    Schedule      Nutrition Diagnosis        Nutrition Dx Problem 1 Moderate malnutrition related to increased nutrient needs due to catabolic disease as evidenced by hypermetabolic state., muscle wasting. and fat loss.       Nutrition Intervention       RD Action Nutritional Counseling  Nutrition F/U  Written Materials: Diarrhea MNT     Monitor/Evaluation       Monitor Per oncology nutrition protocol.     Comments:  Spoke with pt during infusion of IVF. Current weight indicates slight weight loss of 1.4% X 3 weeks. Pt reports she is consuming smaller but more frequent meals. She is experiencing diarrhea but denies any s/sx of steatorrhea. She continues to take her Creon with each small  meal. She believes diarrhea is from tx. Reviewed with pt MNT for diarrhea, including known GI irritants to avoid and soluble fiber food sources to aid GI distress. Also discussed current elevated BG levels. Pt reports she did not take her DM medication today which is why BG levels are high. Pt reports she normally takes this as scheduled but just forgot today. Encouraged pt to continue smaller / more frequent meals with good protein sources and a more Low residue diet to aid GI distress. Pt reports she has not had feeding tube placed (as previously discussed) and is not aware of any plans for feeding tube placement. She does report she is open to this if needed (pt has previously had a feeding tube). Provided written materials as well as RD contact information for further information. Will f/u per protocol.     Electronically signed by:  Malia Downey RD  09/14/22 15:32 EDT

## 2022-09-19 ENCOUNTER — HOSPITAL ENCOUNTER (INPATIENT)
Facility: HOSPITAL | Age: 72
LOS: 4 days | Discharge: HOME | End: 2022-09-23
Attending: EMERGENCY MEDICINE | Admitting: INTERNAL MEDICINE

## 2022-09-19 ENCOUNTER — APPOINTMENT (OUTPATIENT)
Dept: GENERAL RADIOLOGY | Facility: HOSPITAL | Age: 72
End: 2022-09-19

## 2022-09-19 DIAGNOSIS — Z85.07 HISTORY OF PANCREATIC CANCER: ICD-10-CM

## 2022-09-19 DIAGNOSIS — R26.2 DIFFICULTY WALKING: ICD-10-CM

## 2022-09-19 DIAGNOSIS — N39.0 URINARY TRACT INFECTION WITHOUT HEMATURIA, SITE UNSPECIFIED: ICD-10-CM

## 2022-09-19 DIAGNOSIS — E87.20 LACTIC ACIDOSIS: ICD-10-CM

## 2022-09-19 DIAGNOSIS — A41.9 SEPSIS WITHOUT ACUTE ORGAN DYSFUNCTION, DUE TO UNSPECIFIED ORGANISM: Primary | ICD-10-CM

## 2022-09-19 LAB
ALBUMIN SERPL-MCNC: 3.7 G/DL (ref 3.5–5.2)
ALBUMIN/GLOB SERPL: 1.2 G/DL
ALP SERPL-CCNC: 173 U/L (ref 39–117)
ALT SERPL W P-5'-P-CCNC: 46 U/L (ref 1–33)
ANION GAP SERPL CALCULATED.3IONS-SCNC: 14.4 MMOL/L (ref 5–15)
APTT PPP: 37.5 SECONDS (ref 24.2–34.2)
AST SERPL-CCNC: 92 U/L (ref 1–32)
BACTERIA UR QL AUTO: ABNORMAL /HPF
BASOPHILS # BLD AUTO: 0.05 10*3/MM3 (ref 0–0.2)
BASOPHILS NFR BLD AUTO: 0.5 % (ref 0–1.5)
BILIRUB SERPL-MCNC: 1.3 MG/DL (ref 0–1.2)
BILIRUB UR QL STRIP: NEGATIVE
BUN SERPL-MCNC: 14 MG/DL (ref 8–23)
BUN/CREAT SERPL: 14.6 (ref 7–25)
CALCIUM SPEC-SCNC: 9 MG/DL (ref 8.6–10.5)
CHLORIDE SERPL-SCNC: 102 MMOL/L (ref 98–107)
CLARITY UR: CLEAR
CO2 SERPL-SCNC: 19.6 MMOL/L (ref 22–29)
COLOR UR: YELLOW
CREAT SERPL-MCNC: 0.96 MG/DL (ref 0.57–1)
D-LACTATE SERPL-SCNC: 3.4 MMOL/L (ref 0.5–2)
DEPRECATED RDW RBC AUTO: 48.1 FL (ref 37–54)
EGFRCR SERPLBLD CKD-EPI 2021: 62.6 ML/MIN/1.73
EOSINOPHIL # BLD AUTO: 0.03 10*3/MM3 (ref 0–0.4)
EOSINOPHIL NFR BLD AUTO: 0.3 % (ref 0.3–6.2)
ERYTHROCYTE [DISTWIDTH] IN BLOOD BY AUTOMATED COUNT: 15.7 % (ref 12.3–15.4)
FLUAV AG NPH QL: NEGATIVE
FLUBV AG NPH QL IA: NEGATIVE
GLOBULIN UR ELPH-MCNC: 3 GM/DL
GLUCOSE SERPL-MCNC: 167 MG/DL (ref 65–99)
GLUCOSE UR STRIP-MCNC: NEGATIVE MG/DL
HCT VFR BLD AUTO: 32.3 % (ref 34–46.6)
HGB BLD-MCNC: 10.7 G/DL (ref 12–15.9)
HGB UR QL STRIP.AUTO: NEGATIVE
HOLD SPECIMEN: NORMAL
HOLD SPECIMEN: NORMAL
HYALINE CASTS UR QL AUTO: ABNORMAL /LPF
IMM GRANULOCYTES # BLD AUTO: 0.11 10*3/MM3 (ref 0–0.05)
IMM GRANULOCYTES NFR BLD AUTO: 1.1 % (ref 0–0.5)
INR PPP: 2.23 (ref 0.86–1.15)
KETONES UR QL STRIP: ABNORMAL
LEUKOCYTE ESTERASE UR QL STRIP.AUTO: NEGATIVE
LYMPHOCYTES # BLD AUTO: 0.62 10*3/MM3 (ref 0.7–3.1)
LYMPHOCYTES NFR BLD AUTO: 6.2 % (ref 19.6–45.3)
MAGNESIUM SERPL-MCNC: 1.7 MG/DL (ref 1.6–2.4)
MCH RBC QN AUTO: 29.5 PG (ref 26.6–33)
MCHC RBC AUTO-ENTMCNC: 33.1 G/DL (ref 31.5–35.7)
MCV RBC AUTO: 89 FL (ref 79–97)
MONOCYTES # BLD AUTO: 0.8 10*3/MM3 (ref 0.1–0.9)
MONOCYTES NFR BLD AUTO: 8 % (ref 5–12)
NEUTROPHILS NFR BLD AUTO: 8.35 10*3/MM3 (ref 1.7–7)
NEUTROPHILS NFR BLD AUTO: 83.9 % (ref 42.7–76)
NITRITE UR QL STRIP: POSITIVE
NRBC BLD AUTO-RTO: 0 /100 WBC (ref 0–0.2)
PH UR STRIP.AUTO: 5.5 [PH] (ref 5–8)
PLATELET # BLD AUTO: 298 10*3/MM3 (ref 140–450)
PMV BLD AUTO: 9.3 FL (ref 6–12)
POTASSIUM SERPL-SCNC: 4.2 MMOL/L (ref 3.5–5.2)
PROT SERPL-MCNC: 6.7 G/DL (ref 6–8.5)
PROT UR QL STRIP: NEGATIVE
PROTHROMBIN TIME: 25.1 SECONDS (ref 11.8–14.9)
QT INTERVAL: 307 MS
RBC # BLD AUTO: 3.63 10*6/MM3 (ref 3.77–5.28)
RBC # UR STRIP: ABNORMAL /HPF
REF LAB TEST METHOD: ABNORMAL
SODIUM SERPL-SCNC: 136 MMOL/L (ref 136–145)
SP GR UR STRIP: 1.02 (ref 1–1.03)
SQUAMOUS #/AREA URNS HPF: ABNORMAL /HPF
TROPONIN T SERPL-MCNC: <0.01 NG/ML (ref 0–0.03)
UROBILINOGEN UR QL STRIP: ABNORMAL
WBC # UR STRIP: ABNORMAL /HPF
WBC NRBC COR # BLD: 9.96 10*3/MM3 (ref 3.4–10.8)
WHOLE BLOOD HOLD COAG: NORMAL
WHOLE BLOOD HOLD SPECIMEN: NORMAL

## 2022-09-19 PROCEDURE — 36415 COLL VENOUS BLD VENIPUNCTURE: CPT

## 2022-09-19 PROCEDURE — 25010000002 VANCOMYCIN 5 G RECONSTITUTED SOLUTION: Performed by: EMERGENCY MEDICINE

## 2022-09-19 PROCEDURE — 84484 ASSAY OF TROPONIN QUANT: CPT | Performed by: EMERGENCY MEDICINE

## 2022-09-19 PROCEDURE — 71045 X-RAY EXAM CHEST 1 VIEW: CPT

## 2022-09-19 PROCEDURE — 25010000002 ONDANSETRON PER 1 MG: Performed by: EMERGENCY MEDICINE

## 2022-09-19 PROCEDURE — 99285 EMERGENCY DEPT VISIT HI MDM: CPT

## 2022-09-19 PROCEDURE — 85610 PROTHROMBIN TIME: CPT | Performed by: EMERGENCY MEDICINE

## 2022-09-19 PROCEDURE — 80053 COMPREHEN METABOLIC PANEL: CPT | Performed by: EMERGENCY MEDICINE

## 2022-09-19 PROCEDURE — 93005 ELECTROCARDIOGRAM TRACING: CPT

## 2022-09-19 PROCEDURE — 87040 BLOOD CULTURE FOR BACTERIA: CPT | Performed by: EMERGENCY MEDICINE

## 2022-09-19 PROCEDURE — P9612 CATHETERIZE FOR URINE SPEC: HCPCS

## 2022-09-19 PROCEDURE — 83735 ASSAY OF MAGNESIUM: CPT | Performed by: EMERGENCY MEDICINE

## 2022-09-19 PROCEDURE — 85730 THROMBOPLASTIN TIME PARTIAL: CPT | Performed by: EMERGENCY MEDICINE

## 2022-09-19 PROCEDURE — 85025 COMPLETE CBC W/AUTO DIFF WBC: CPT | Performed by: EMERGENCY MEDICINE

## 2022-09-19 PROCEDURE — 83605 ASSAY OF LACTIC ACID: CPT | Performed by: EMERGENCY MEDICINE

## 2022-09-19 PROCEDURE — 81001 URINALYSIS AUTO W/SCOPE: CPT | Performed by: EMERGENCY MEDICINE

## 2022-09-19 PROCEDURE — 93005 ELECTROCARDIOGRAM TRACING: CPT | Performed by: EMERGENCY MEDICINE

## 2022-09-19 PROCEDURE — 25010000002 PIPERACILLIN SOD-TAZOBACTAM PER 1 G: Performed by: EMERGENCY MEDICINE

## 2022-09-19 PROCEDURE — U0004 COV-19 TEST NON-CDC HGH THRU: HCPCS | Performed by: EMERGENCY MEDICINE

## 2022-09-19 PROCEDURE — 87804 INFLUENZA ASSAY W/OPTIC: CPT | Performed by: EMERGENCY MEDICINE

## 2022-09-19 RX ORDER — ACETAMINOPHEN 325 MG/1
650 TABLET ORAL ONCE
Status: COMPLETED | OUTPATIENT
Start: 2022-09-19 | End: 2022-09-19

## 2022-09-19 RX ORDER — ONDANSETRON 2 MG/ML
4 INJECTION INTRAMUSCULAR; INTRAVENOUS ONCE
Status: COMPLETED | OUTPATIENT
Start: 2022-09-19 | End: 2022-09-19

## 2022-09-19 RX ORDER — SODIUM CHLORIDE 0.9 % (FLUSH) 0.9 %
10 SYRINGE (ML) INJECTION AS NEEDED
Status: DISCONTINUED | OUTPATIENT
Start: 2022-09-19 | End: 2022-09-23 | Stop reason: HOSPADM

## 2022-09-19 RX ORDER — PANTOPRAZOLE SODIUM 40 MG/10ML
40 INJECTION, POWDER, LYOPHILIZED, FOR SOLUTION INTRAVENOUS ONCE
Status: COMPLETED | OUTPATIENT
Start: 2022-09-19 | End: 2022-09-19

## 2022-09-19 RX ADMIN — ACETAMINOPHEN 650 MG: 325 TABLET ORAL at 21:02

## 2022-09-19 RX ADMIN — ONDANSETRON 4 MG: 2 INJECTION INTRAMUSCULAR; INTRAVENOUS at 21:44

## 2022-09-19 RX ADMIN — VANCOMYCIN HYDROCHLORIDE 1250 MG: 5 INJECTION, POWDER, LYOPHILIZED, FOR SOLUTION INTRAVENOUS at 21:49

## 2022-09-19 RX ADMIN — TAZOBACTAM SODIUM AND PIPERACILLIN SODIUM 3.38 G: 375; 3 INJECTION, SOLUTION INTRAVENOUS at 21:32

## 2022-09-19 RX ADMIN — PANTOPRAZOLE SODIUM 40 MG: 40 INJECTION, POWDER, FOR SOLUTION INTRAVENOUS at 21:45

## 2022-09-19 RX ADMIN — SODIUM CHLORIDE 1000 ML: 9 INJECTION, SOLUTION INTRAVENOUS at 21:43

## 2022-09-20 ENCOUNTER — APPOINTMENT (OUTPATIENT)
Dept: CT IMAGING | Facility: HOSPITAL | Age: 72
End: 2022-09-20

## 2022-09-20 LAB
ANION GAP SERPL CALCULATED.3IONS-SCNC: 12.8 MMOL/L (ref 5–15)
BASOPHILS # BLD AUTO: 0.03 10*3/MM3 (ref 0–0.2)
BASOPHILS NFR BLD AUTO: 0.2 % (ref 0–1.5)
BUN SERPL-MCNC: 18 MG/DL (ref 8–23)
BUN/CREAT SERPL: 17.8 (ref 7–25)
CALCIUM SPEC-SCNC: 7.7 MG/DL (ref 8.6–10.5)
CHLORIDE SERPL-SCNC: 104 MMOL/L (ref 98–107)
CO2 SERPL-SCNC: 18.2 MMOL/L (ref 22–29)
CREAT SERPL-MCNC: 1.01 MG/DL (ref 0.57–1)
D-LACTATE SERPL-SCNC: 3.2 MMOL/L (ref 0.5–2)
D-LACTATE SERPL-SCNC: 3.6 MMOL/L (ref 0.5–2)
D-LACTATE SERPL-SCNC: 4.2 MMOL/L (ref 0.5–2)
D-LACTATE SERPL-SCNC: 4.7 MMOL/L (ref 0.5–2)
D-LACTATE SERPL-SCNC: 5 MMOL/L (ref 0.5–2)
DEPRECATED RDW RBC AUTO: 48.1 FL (ref 37–54)
EGFRCR SERPLBLD CKD-EPI 2021: 58.9 ML/MIN/1.73
EOSINOPHIL # BLD AUTO: 0 10*3/MM3 (ref 0–0.4)
EOSINOPHIL NFR BLD AUTO: 0 % (ref 0.3–6.2)
ERYTHROCYTE [DISTWIDTH] IN BLOOD BY AUTOMATED COUNT: 15.8 % (ref 12.3–15.4)
GLUCOSE BLDC GLUCOMTR-MCNC: 175 MG/DL (ref 70–99)
GLUCOSE BLDC GLUCOMTR-MCNC: 190 MG/DL (ref 70–99)
GLUCOSE BLDC GLUCOMTR-MCNC: 212 MG/DL (ref 70–99)
GLUCOSE SERPL-MCNC: 228 MG/DL (ref 65–99)
HCT VFR BLD AUTO: 24.6 % (ref 34–46.6)
HGB BLD-MCNC: 8.4 G/DL (ref 12–15.9)
IMM GRANULOCYTES # BLD AUTO: 0.1 10*3/MM3 (ref 0–0.05)
IMM GRANULOCYTES NFR BLD AUTO: 0.7 % (ref 0–0.5)
LYMPHOCYTES # BLD AUTO: 0.9 10*3/MM3 (ref 0.7–3.1)
LYMPHOCYTES NFR BLD AUTO: 6.5 % (ref 19.6–45.3)
MCH RBC QN AUTO: 30.2 PG (ref 26.6–33)
MCHC RBC AUTO-ENTMCNC: 34.1 G/DL (ref 31.5–35.7)
MCV RBC AUTO: 88.5 FL (ref 79–97)
MONOCYTES # BLD AUTO: 1.59 10*3/MM3 (ref 0.1–0.9)
MONOCYTES NFR BLD AUTO: 11.5 % (ref 5–12)
MRSA DNA SPEC QL NAA+PROBE: ABNORMAL
NEUTROPHILS NFR BLD AUTO: 11.15 10*3/MM3 (ref 1.7–7)
NEUTROPHILS NFR BLD AUTO: 81.1 % (ref 42.7–76)
NRBC BLD AUTO-RTO: 0 /100 WBC (ref 0–0.2)
PLATELET # BLD AUTO: 176 10*3/MM3 (ref 140–450)
PMV BLD AUTO: 9.2 FL (ref 6–12)
POTASSIUM SERPL-SCNC: 4.5 MMOL/L (ref 3.5–5.2)
RBC # BLD AUTO: 2.78 10*6/MM3 (ref 3.77–5.28)
SARS-COV-2 RNA PNL SPEC NAA+PROBE: NOT DETECTED
SODIUM SERPL-SCNC: 135 MMOL/L (ref 136–145)
WBC NRBC COR # BLD: 13.77 10*3/MM3 (ref 3.4–10.8)

## 2022-09-20 PROCEDURE — 83605 ASSAY OF LACTIC ACID: CPT | Performed by: EMERGENCY MEDICINE

## 2022-09-20 PROCEDURE — 25010000002 HEPARIN (PORCINE) PER 1000 UNITS: Performed by: FAMILY MEDICINE

## 2022-09-20 PROCEDURE — 25010000002 PIPERACILLIN SOD-TAZOBACTAM PER 1 G: Performed by: FAMILY MEDICINE

## 2022-09-20 PROCEDURE — 80048 BASIC METABOLIC PNL TOTAL CA: CPT | Performed by: FAMILY MEDICINE

## 2022-09-20 PROCEDURE — 87505 NFCT AGENT DETECTION GI: CPT | Performed by: INTERNAL MEDICINE

## 2022-09-20 PROCEDURE — 87641 MR-STAPH DNA AMP PROBE: CPT | Performed by: FAMILY MEDICINE

## 2022-09-20 PROCEDURE — 83605 ASSAY OF LACTIC ACID: CPT | Performed by: FAMILY MEDICINE

## 2022-09-20 PROCEDURE — 85025 COMPLETE CBC W/AUTO DIFF WBC: CPT | Performed by: FAMILY MEDICINE

## 2022-09-20 PROCEDURE — 0 IOPAMIDOL PER 1 ML: Performed by: INTERNAL MEDICINE

## 2022-09-20 PROCEDURE — 63710000001 INSULIN LISPRO (HUMAN) PER 5 UNITS: Performed by: FAMILY MEDICINE

## 2022-09-20 PROCEDURE — 99223 1ST HOSP IP/OBS HIGH 75: CPT | Performed by: FAMILY MEDICINE

## 2022-09-20 PROCEDURE — 74177 CT ABD & PELVIS W/CONTRAST: CPT

## 2022-09-20 PROCEDURE — 82962 GLUCOSE BLOOD TEST: CPT

## 2022-09-20 PROCEDURE — 25010000002 ONDANSETRON PER 1 MG: Performed by: FAMILY MEDICINE

## 2022-09-20 RX ORDER — INSULIN LISPRO 100 [IU]/ML
0-9 INJECTION, SOLUTION INTRAVENOUS; SUBCUTANEOUS
Status: DISCONTINUED | OUTPATIENT
Start: 2022-09-20 | End: 2022-09-23 | Stop reason: HOSPADM

## 2022-09-20 RX ORDER — SODIUM CHLORIDE 0.9 % (FLUSH) 0.9 %
10 SYRINGE (ML) INJECTION AS NEEDED
Status: DISCONTINUED | OUTPATIENT
Start: 2022-09-20 | End: 2022-09-23 | Stop reason: HOSPADM

## 2022-09-20 RX ORDER — SERTRALINE HYDROCHLORIDE 100 MG/1
100 TABLET, FILM COATED ORAL DAILY
Status: DISCONTINUED | OUTPATIENT
Start: 2022-09-20 | End: 2022-09-21

## 2022-09-20 RX ORDER — CHOLECALCIFEROL (VITAMIN D3) 125 MCG
5 CAPSULE ORAL NIGHTLY PRN
Status: DISCONTINUED | OUTPATIENT
Start: 2022-09-20 | End: 2022-09-23 | Stop reason: HOSPADM

## 2022-09-20 RX ORDER — ALPRAZOLAM 0.25 MG/1
0.25 TABLET ORAL 2 TIMES DAILY PRN
Status: DISCONTINUED | OUTPATIENT
Start: 2022-09-20 | End: 2022-09-23 | Stop reason: HOSPADM

## 2022-09-20 RX ORDER — ONDANSETRON 2 MG/ML
4 INJECTION INTRAMUSCULAR; INTRAVENOUS EVERY 6 HOURS PRN
Status: DISCONTINUED | OUTPATIENT
Start: 2022-09-20 | End: 2022-09-23 | Stop reason: HOSPADM

## 2022-09-20 RX ORDER — OXYCODONE HYDROCHLORIDE 5 MG/1
5 TABLET ORAL EVERY 4 HOURS PRN
Status: DISCONTINUED | OUTPATIENT
Start: 2022-09-20 | End: 2022-09-23 | Stop reason: HOSPADM

## 2022-09-20 RX ORDER — POLYETHYLENE GLYCOL 3350 17 G/17G
17 POWDER, FOR SOLUTION ORAL DAILY PRN
Status: DISCONTINUED | OUTPATIENT
Start: 2022-09-20 | End: 2022-09-20

## 2022-09-20 RX ORDER — BISACODYL 10 MG
10 SUPPOSITORY, RECTAL RECTAL DAILY PRN
Status: DISCONTINUED | OUTPATIENT
Start: 2022-09-20 | End: 2022-09-20

## 2022-09-20 RX ORDER — AMOXICILLIN 250 MG
2 CAPSULE ORAL 2 TIMES DAILY
Status: DISCONTINUED | OUTPATIENT
Start: 2022-09-20 | End: 2022-09-20

## 2022-09-20 RX ORDER — SODIUM CHLORIDE 0.9 % (FLUSH) 0.9 %
10 SYRINGE (ML) INJECTION EVERY 12 HOURS SCHEDULED
Status: DISCONTINUED | OUTPATIENT
Start: 2022-09-20 | End: 2022-09-23 | Stop reason: HOSPADM

## 2022-09-20 RX ORDER — BISACODYL 5 MG/1
5 TABLET, DELAYED RELEASE ORAL DAILY PRN
Status: DISCONTINUED | OUTPATIENT
Start: 2022-09-20 | End: 2022-09-20

## 2022-09-20 RX ORDER — BUSPIRONE HYDROCHLORIDE 10 MG/1
10 TABLET ORAL 2 TIMES DAILY
Status: DISCONTINUED | OUTPATIENT
Start: 2022-09-20 | End: 2022-09-23 | Stop reason: HOSPADM

## 2022-09-20 RX ORDER — SODIUM CHLORIDE 9 MG/ML
40 INJECTION, SOLUTION INTRAVENOUS AS NEEDED
Status: DISCONTINUED | OUTPATIENT
Start: 2022-09-20 | End: 2022-09-23 | Stop reason: HOSPADM

## 2022-09-20 RX ORDER — ACETAMINOPHEN 325 MG/1
650 TABLET ORAL EVERY 4 HOURS PRN
Status: DISCONTINUED | OUTPATIENT
Start: 2022-09-20 | End: 2022-09-23 | Stop reason: HOSPADM

## 2022-09-20 RX ORDER — ACETAMINOPHEN 650 MG/1
650 SUPPOSITORY RECTAL EVERY 4 HOURS PRN
Status: DISCONTINUED | OUTPATIENT
Start: 2022-09-20 | End: 2022-09-23 | Stop reason: HOSPADM

## 2022-09-20 RX ORDER — ACETAMINOPHEN 160 MG/5ML
650 SOLUTION ORAL EVERY 4 HOURS PRN
Status: DISCONTINUED | OUTPATIENT
Start: 2022-09-20 | End: 2022-09-23 | Stop reason: HOSPADM

## 2022-09-20 RX ORDER — HEPARIN SODIUM 5000 [USP'U]/ML
5000 INJECTION, SOLUTION INTRAVENOUS; SUBCUTANEOUS EVERY 8 HOURS SCHEDULED
Status: DISCONTINUED | OUTPATIENT
Start: 2022-09-20 | End: 2022-09-23 | Stop reason: HOSPADM

## 2022-09-20 RX ORDER — NICOTINE POLACRILEX 4 MG
15 LOZENGE BUCCAL
Status: DISCONTINUED | OUTPATIENT
Start: 2022-09-20 | End: 2022-09-23 | Stop reason: HOSPADM

## 2022-09-20 RX ORDER — DEXTROSE MONOHYDRATE 25 G/50ML
25 INJECTION, SOLUTION INTRAVENOUS
Status: DISCONTINUED | OUTPATIENT
Start: 2022-09-20 | End: 2022-09-23 | Stop reason: HOSPADM

## 2022-09-20 RX ADMIN — INSULIN LISPRO 2 UNITS: 100 INJECTION, SOLUTION INTRAVENOUS; SUBCUTANEOUS at 16:34

## 2022-09-20 RX ADMIN — TAZOBACTAM SODIUM AND PIPERACILLIN SODIUM 3.38 G: 375; 3 INJECTION, SOLUTION INTRAVENOUS at 06:33

## 2022-09-20 RX ADMIN — Medication 5 MG: at 20:54

## 2022-09-20 RX ADMIN — HEPARIN SODIUM 5000 UNITS: 5000 INJECTION INTRAVENOUS; SUBCUTANEOUS at 05:35

## 2022-09-20 RX ADMIN — BUSPIRONE HYDROCHLORIDE 10 MG: 10 TABLET ORAL at 20:55

## 2022-09-20 RX ADMIN — OXYCODONE HYDROCHLORIDE 5 MG: 5 TABLET ORAL at 21:34

## 2022-09-20 RX ADMIN — TAZOBACTAM SODIUM AND PIPERACILLIN SODIUM 3.38 G: 375; 3 INJECTION, SOLUTION INTRAVENOUS at 14:29

## 2022-09-20 RX ADMIN — SODIUM CHLORIDE, POTASSIUM CHLORIDE, SODIUM LACTATE AND CALCIUM CHLORIDE 1000 ML: 600; 310; 30; 20 INJECTION, SOLUTION INTRAVENOUS at 10:26

## 2022-09-20 RX ADMIN — SODIUM CHLORIDE, POTASSIUM CHLORIDE, SODIUM LACTATE AND CALCIUM CHLORIDE 1950 ML: 600; 310; 30; 20 INJECTION, SOLUTION INTRAVENOUS at 05:36

## 2022-09-20 RX ADMIN — Medication 10 ML: at 08:54

## 2022-09-20 RX ADMIN — PANCRELIPASE 18000 UNITS OF LIPASE: 30000; 6000; 19000 CAPSULE, DELAYED RELEASE PELLETS ORAL at 11:49

## 2022-09-20 RX ADMIN — ALPRAZOLAM 0.25 MG: 0.25 TABLET ORAL at 20:54

## 2022-09-20 RX ADMIN — OXYCODONE HYDROCHLORIDE 5 MG: 5 TABLET ORAL at 17:02

## 2022-09-20 RX ADMIN — ALPRAZOLAM 0.25 MG: 0.25 TABLET ORAL at 10:26

## 2022-09-20 RX ADMIN — PANCRELIPASE 18000 UNITS OF LIPASE: 30000; 6000; 19000 CAPSULE, DELAYED RELEASE PELLETS ORAL at 20:55

## 2022-09-20 RX ADMIN — SERTRALINE HYDROCHLORIDE 100 MG: 100 TABLET ORAL at 11:48

## 2022-09-20 RX ADMIN — Medication 10 ML: at 20:57

## 2022-09-20 RX ADMIN — ONDANSETRON 4 MG: 2 INJECTION INTRAMUSCULAR; INTRAVENOUS at 17:04

## 2022-09-20 RX ADMIN — HEPARIN SODIUM 5000 UNITS: 5000 INJECTION INTRAVENOUS; SUBCUTANEOUS at 20:55

## 2022-09-20 RX ADMIN — BUSPIRONE HYDROCHLORIDE 10 MG: 10 TABLET ORAL at 11:48

## 2022-09-20 RX ADMIN — TAZOBACTAM SODIUM AND PIPERACILLIN SODIUM 3.38 G: 375; 3 INJECTION, SOLUTION INTRAVENOUS at 20:57

## 2022-09-20 RX ADMIN — PANCRELIPASE 18000 UNITS OF LIPASE: 30000; 6000; 19000 CAPSULE, DELAYED RELEASE PELLETS ORAL at 16:35

## 2022-09-20 RX ADMIN — Medication 10 ML: at 01:31

## 2022-09-20 RX ADMIN — SODIUM CHLORIDE, POTASSIUM CHLORIDE, SODIUM LACTATE AND CALCIUM CHLORIDE 1000 ML: 600; 310; 30; 20 INJECTION, SOLUTION INTRAVENOUS at 16:35

## 2022-09-20 RX ADMIN — INSULIN LISPRO 4 UNITS: 100 INJECTION, SOLUTION INTRAVENOUS; SUBCUTANEOUS at 08:53

## 2022-09-20 RX ADMIN — INSULIN LISPRO 2 UNITS: 100 INJECTION, SOLUTION INTRAVENOUS; SUBCUTANEOUS at 12:09

## 2022-09-20 RX ADMIN — HEPARIN SODIUM 5000 UNITS: 5000 INJECTION INTRAVENOUS; SUBCUTANEOUS at 14:29

## 2022-09-20 RX ADMIN — IOPAMIDOL 100 ML: 755 INJECTION, SOLUTION INTRAVENOUS at 21:33

## 2022-09-21 ENCOUNTER — APPOINTMENT (OUTPATIENT)
Dept: ONCOLOGY | Facility: HOSPITAL | Age: 72
End: 2022-09-21

## 2022-09-21 LAB
ALBUMIN SERPL-MCNC: 2.5 G/DL (ref 3.5–5.2)
ALBUMIN/GLOB SERPL: 1.2 G/DL
ALP SERPL-CCNC: 162 U/L (ref 39–117)
ALT SERPL W P-5'-P-CCNC: 116 U/L (ref 1–33)
ANION GAP SERPL CALCULATED.3IONS-SCNC: 8.8 MMOL/L (ref 5–15)
AST SERPL-CCNC: 135 U/L (ref 1–32)
BILIRUB SERPL-MCNC: 0.9 MG/DL (ref 0–1.2)
BUN SERPL-MCNC: 18 MG/DL (ref 8–23)
BUN/CREAT SERPL: 20 (ref 7–25)
C COLI+JEJ+UPSA DNA STL QL NAA+NON-PROBE: NOT DETECTED
CALCIUM SPEC-SCNC: 7.7 MG/DL (ref 8.6–10.5)
CHLORIDE SERPL-SCNC: 102 MMOL/L (ref 98–107)
CO2 SERPL-SCNC: 21.2 MMOL/L (ref 22–29)
CREAT SERPL-MCNC: 0.9 MG/DL (ref 0.57–1)
D-LACTATE SERPL-SCNC: 1.2 MMOL/L (ref 0.5–2)
D-LACTATE SERPL-SCNC: 1.7 MMOL/L (ref 0.5–2)
DEPRECATED RDW RBC AUTO: 49.1 FL (ref 37–54)
EC STX1+STX2 GENES STL QL NAA+NON-PROBE: NOT DETECTED
EGFRCR SERPLBLD CKD-EPI 2021: 67.6 ML/MIN/1.73
ERYTHROCYTE [DISTWIDTH] IN BLOOD BY AUTOMATED COUNT: 16 % (ref 12.3–15.4)
GLOBULIN UR ELPH-MCNC: 2.1 GM/DL
GLUCOSE BLDC GLUCOMTR-MCNC: 149 MG/DL (ref 70–99)
GLUCOSE BLDC GLUCOMTR-MCNC: 153 MG/DL (ref 70–99)
GLUCOSE BLDC GLUCOMTR-MCNC: 193 MG/DL (ref 70–99)
GLUCOSE SERPL-MCNC: 178 MG/DL (ref 65–99)
HCT VFR BLD AUTO: 23.5 % (ref 34–46.6)
HGB BLD-MCNC: 8.1 G/DL (ref 12–15.9)
MAGNESIUM SERPL-MCNC: 1.5 MG/DL (ref 1.6–2.4)
MCH RBC QN AUTO: 30.2 PG (ref 26.6–33)
MCHC RBC AUTO-ENTMCNC: 34.5 G/DL (ref 31.5–35.7)
MCV RBC AUTO: 87.7 FL (ref 79–97)
PLATELET # BLD AUTO: 158 10*3/MM3 (ref 140–450)
PMV BLD AUTO: 9.3 FL (ref 6–12)
POTASSIUM SERPL-SCNC: 3.8 MMOL/L (ref 3.5–5.2)
PROT SERPL-MCNC: 4.6 G/DL (ref 6–8.5)
RBC # BLD AUTO: 2.68 10*6/MM3 (ref 3.77–5.28)
S ENT+BONG DNA STL QL NAA+NON-PROBE: NOT DETECTED
SHIGELLA SP+EIEC IPAH ST NAA+NON-PROBE: NOT DETECTED
SODIUM SERPL-SCNC: 132 MMOL/L (ref 136–145)
WBC NRBC COR # BLD: 13.37 10*3/MM3 (ref 3.4–10.8)

## 2022-09-21 PROCEDURE — 83605 ASSAY OF LACTIC ACID: CPT | Performed by: FAMILY MEDICINE

## 2022-09-21 PROCEDURE — 85027 COMPLETE CBC AUTOMATED: CPT | Performed by: INTERNAL MEDICINE

## 2022-09-21 PROCEDURE — 99232 SBSQ HOSP IP/OBS MODERATE 35: CPT | Performed by: INTERNAL MEDICINE

## 2022-09-21 PROCEDURE — 83605 ASSAY OF LACTIC ACID: CPT | Performed by: EMERGENCY MEDICINE

## 2022-09-21 PROCEDURE — 25010000002 PIPERACILLIN SOD-TAZOBACTAM PER 1 G: Performed by: FAMILY MEDICINE

## 2022-09-21 PROCEDURE — 83735 ASSAY OF MAGNESIUM: CPT | Performed by: INTERNAL MEDICINE

## 2022-09-21 PROCEDURE — 80053 COMPREHEN METABOLIC PANEL: CPT | Performed by: INTERNAL MEDICINE

## 2022-09-21 PROCEDURE — 97161 PT EVAL LOW COMPLEX 20 MIN: CPT

## 2022-09-21 PROCEDURE — 63710000001 INSULIN LISPRO (HUMAN) PER 5 UNITS: Performed by: FAMILY MEDICINE

## 2022-09-21 PROCEDURE — 25010000002 HEPARIN (PORCINE) PER 1000 UNITS: Performed by: FAMILY MEDICINE

## 2022-09-21 PROCEDURE — 82962 GLUCOSE BLOOD TEST: CPT

## 2022-09-21 PROCEDURE — 25010000002 MAGNESIUM SULFATE 2 GM/50ML SOLUTION: Performed by: INTERNAL MEDICINE

## 2022-09-21 RX ORDER — SERTRALINE HYDROCHLORIDE 100 MG/1
100 TABLET, FILM COATED ORAL NIGHTLY
Status: DISCONTINUED | OUTPATIENT
Start: 2022-09-22 | End: 2022-09-23 | Stop reason: HOSPADM

## 2022-09-21 RX ORDER — MAGNESIUM SULFATE HEPTAHYDRATE 40 MG/ML
2 INJECTION, SOLUTION INTRAVENOUS ONCE
Status: COMPLETED | OUTPATIENT
Start: 2022-09-21 | End: 2022-09-21

## 2022-09-21 RX ORDER — TRAZODONE HYDROCHLORIDE 100 MG/1
100 TABLET ORAL NIGHTLY
Status: DISCONTINUED | OUTPATIENT
Start: 2022-09-21 | End: 2022-09-23 | Stop reason: HOSPADM

## 2022-09-21 RX ORDER — TRAZODONE HYDROCHLORIDE 100 MG/1
100 TABLET ORAL NIGHTLY
COMMUNITY

## 2022-09-21 RX ADMIN — TRAZODONE HYDROCHLORIDE 100 MG: 100 TABLET ORAL at 21:24

## 2022-09-21 RX ADMIN — HEPARIN SODIUM 5000 UNITS: 5000 INJECTION INTRAVENOUS; SUBCUTANEOUS at 20:22

## 2022-09-21 RX ADMIN — Medication 10 ML: at 20:22

## 2022-09-21 RX ADMIN — PANCRELIPASE 18000 UNITS OF LIPASE: 30000; 6000; 19000 CAPSULE, DELAYED RELEASE PELLETS ORAL at 17:36

## 2022-09-21 RX ADMIN — PANCRELIPASE 18000 UNITS OF LIPASE: 30000; 6000; 19000 CAPSULE, DELAYED RELEASE PELLETS ORAL at 20:22

## 2022-09-21 RX ADMIN — TAZOBACTAM SODIUM AND PIPERACILLIN SODIUM 3.38 G: 375; 3 INJECTION, SOLUTION INTRAVENOUS at 14:36

## 2022-09-21 RX ADMIN — SERTRALINE HYDROCHLORIDE 100 MG: 100 TABLET ORAL at 08:37

## 2022-09-21 RX ADMIN — PANCRELIPASE 18000 UNITS OF LIPASE: 30000; 6000; 19000 CAPSULE, DELAYED RELEASE PELLETS ORAL at 08:37

## 2022-09-21 RX ADMIN — INSULIN LISPRO 2 UNITS: 100 INJECTION, SOLUTION INTRAVENOUS; SUBCUTANEOUS at 08:12

## 2022-09-21 RX ADMIN — HEPARIN SODIUM 5000 UNITS: 5000 INJECTION INTRAVENOUS; SUBCUTANEOUS at 05:44

## 2022-09-21 RX ADMIN — BUSPIRONE HYDROCHLORIDE 10 MG: 10 TABLET ORAL at 08:37

## 2022-09-21 RX ADMIN — MAGNESIUM SULFATE HEPTAHYDRATE 2 G: 40 INJECTION, SOLUTION INTRAVENOUS at 08:38

## 2022-09-21 RX ADMIN — INSULIN LISPRO 2 UNITS: 100 INJECTION, SOLUTION INTRAVENOUS; SUBCUTANEOUS at 17:36

## 2022-09-21 RX ADMIN — BUSPIRONE HYDROCHLORIDE 10 MG: 10 TABLET ORAL at 20:22

## 2022-09-21 RX ADMIN — Medication 10 ML: at 11:33

## 2022-09-21 RX ADMIN — HEPARIN SODIUM 5000 UNITS: 5000 INJECTION INTRAVENOUS; SUBCUTANEOUS at 14:37

## 2022-09-21 RX ADMIN — TAZOBACTAM SODIUM AND PIPERACILLIN SODIUM 3.38 G: 375; 3 INJECTION, SOLUTION INTRAVENOUS at 05:44

## 2022-09-22 LAB
ALBUMIN SERPL-MCNC: 2.5 G/DL (ref 3.5–5.2)
ALBUMIN/GLOB SERPL: 1 G/DL
ALP SERPL-CCNC: 142 U/L (ref 39–117)
ALT SERPL W P-5'-P-CCNC: 80 U/L (ref 1–33)
ANION GAP SERPL CALCULATED.3IONS-SCNC: 7.8 MMOL/L (ref 5–15)
AST SERPL-CCNC: 57 U/L (ref 1–32)
BILIRUB SERPL-MCNC: 0.8 MG/DL (ref 0–1.2)
BUN SERPL-MCNC: 16 MG/DL (ref 8–23)
BUN/CREAT SERPL: 18.8 (ref 7–25)
CALCIUM SPEC-SCNC: 8.1 MG/DL (ref 8.6–10.5)
CHLORIDE SERPL-SCNC: 103 MMOL/L (ref 98–107)
CO2 SERPL-SCNC: 20.2 MMOL/L (ref 22–29)
CREAT SERPL-MCNC: 0.85 MG/DL (ref 0.57–1)
DEPRECATED RDW RBC AUTO: 49.2 FL (ref 37–54)
EGFRCR SERPLBLD CKD-EPI 2021: 72.4 ML/MIN/1.73
ERYTHROCYTE [DISTWIDTH] IN BLOOD BY AUTOMATED COUNT: 15.9 % (ref 12.3–15.4)
GLOBULIN UR ELPH-MCNC: 2.5 GM/DL
GLUCOSE BLDC GLUCOMTR-MCNC: 120 MG/DL (ref 70–99)
GLUCOSE BLDC GLUCOMTR-MCNC: 188 MG/DL (ref 70–99)
GLUCOSE SERPL-MCNC: 191 MG/DL (ref 65–99)
HCT VFR BLD AUTO: 23.3 % (ref 34–46.6)
HGB BLD-MCNC: 8 G/DL (ref 12–15.9)
MAGNESIUM SERPL-MCNC: 1.9 MG/DL (ref 1.6–2.4)
MCH RBC QN AUTO: 30 PG (ref 26.6–33)
MCHC RBC AUTO-ENTMCNC: 34.3 G/DL (ref 31.5–35.7)
MCV RBC AUTO: 87.3 FL (ref 79–97)
PLATELET # BLD AUTO: 151 10*3/MM3 (ref 140–450)
PMV BLD AUTO: 9.2 FL (ref 6–12)
POTASSIUM SERPL-SCNC: 3.9 MMOL/L (ref 3.5–5.2)
PROT SERPL-MCNC: 5 G/DL (ref 6–8.5)
RBC # BLD AUTO: 2.67 10*6/MM3 (ref 3.77–5.28)
SODIUM SERPL-SCNC: 131 MMOL/L (ref 136–145)
WBC NRBC COR # BLD: 11.91 10*3/MM3 (ref 3.4–10.8)

## 2022-09-22 PROCEDURE — 82962 GLUCOSE BLOOD TEST: CPT

## 2022-09-22 PROCEDURE — 99232 SBSQ HOSP IP/OBS MODERATE 35: CPT | Performed by: INTERNAL MEDICINE

## 2022-09-22 PROCEDURE — 63710000001 INSULIN LISPRO (HUMAN) PER 5 UNITS: Performed by: FAMILY MEDICINE

## 2022-09-22 PROCEDURE — 25010000002 ONDANSETRON PER 1 MG: Performed by: FAMILY MEDICINE

## 2022-09-22 PROCEDURE — 25010000002 HEPARIN (PORCINE) PER 1000 UNITS: Performed by: FAMILY MEDICINE

## 2022-09-22 PROCEDURE — 85027 COMPLETE CBC AUTOMATED: CPT | Performed by: INTERNAL MEDICINE

## 2022-09-22 PROCEDURE — 83735 ASSAY OF MAGNESIUM: CPT | Performed by: INTERNAL MEDICINE

## 2022-09-22 PROCEDURE — 80053 COMPREHEN METABOLIC PANEL: CPT | Performed by: INTERNAL MEDICINE

## 2022-09-22 RX ADMIN — Medication 10 ML: at 21:02

## 2022-09-22 RX ADMIN — INSULIN LISPRO 2 UNITS: 100 INJECTION, SOLUTION INTRAVENOUS; SUBCUTANEOUS at 09:12

## 2022-09-22 RX ADMIN — TRAZODONE HYDROCHLORIDE 100 MG: 100 TABLET ORAL at 21:02

## 2022-09-22 RX ADMIN — INSULIN LISPRO 2 UNITS: 100 INJECTION, SOLUTION INTRAVENOUS; SUBCUTANEOUS at 17:52

## 2022-09-22 RX ADMIN — HEPARIN SODIUM 5000 UNITS: 5000 INJECTION INTRAVENOUS; SUBCUTANEOUS at 05:21

## 2022-09-22 RX ADMIN — PANCRELIPASE 18000 UNITS OF LIPASE: 30000; 6000; 19000 CAPSULE, DELAYED RELEASE PELLETS ORAL at 18:45

## 2022-09-22 RX ADMIN — BUSPIRONE HYDROCHLORIDE 10 MG: 10 TABLET ORAL at 21:02

## 2022-09-22 RX ADMIN — ALPRAZOLAM 0.25 MG: 0.25 TABLET ORAL at 09:38

## 2022-09-22 RX ADMIN — ALPRAZOLAM 0.25 MG: 0.25 TABLET ORAL at 21:02

## 2022-09-22 RX ADMIN — ONDANSETRON 4 MG: 2 INJECTION INTRAMUSCULAR; INTRAVENOUS at 09:38

## 2022-09-22 RX ADMIN — Medication 10 ML: at 09:16

## 2022-09-22 RX ADMIN — BUSPIRONE HYDROCHLORIDE 10 MG: 10 TABLET ORAL at 09:15

## 2022-09-22 RX ADMIN — HEPARIN SODIUM 5000 UNITS: 5000 INJECTION INTRAVENOUS; SUBCUTANEOUS at 21:01

## 2022-09-22 RX ADMIN — PANCRELIPASE 18000 UNITS OF LIPASE: 30000; 6000; 19000 CAPSULE, DELAYED RELEASE PELLETS ORAL at 09:13

## 2022-09-22 RX ADMIN — PANCRELIPASE 18000 UNITS OF LIPASE: 30000; 6000; 19000 CAPSULE, DELAYED RELEASE PELLETS ORAL at 21:02

## 2022-09-22 RX ADMIN — SERTRALINE HYDROCHLORIDE 100 MG: 100 TABLET ORAL at 21:02

## 2022-09-22 RX ADMIN — HEPARIN SODIUM 5000 UNITS: 5000 INJECTION INTRAVENOUS; SUBCUTANEOUS at 15:11

## 2022-09-23 ENCOUNTER — TELEPHONE (OUTPATIENT)
Dept: ONCOLOGY | Facility: HOSPITAL | Age: 72
End: 2022-09-23

## 2022-09-23 ENCOUNTER — READMISSION MANAGEMENT (OUTPATIENT)
Dept: CALL CENTER | Facility: HOSPITAL | Age: 72
End: 2022-09-23

## 2022-09-23 VITALS
TEMPERATURE: 97.7 F | OXYGEN SATURATION: 100 % | BODY MASS INDEX: 23.88 KG/M2 | RESPIRATION RATE: 18 BRPM | HEIGHT: 65 IN | HEART RATE: 95 BPM | WEIGHT: 143.3 LBS | DIASTOLIC BLOOD PRESSURE: 68 MMHG | SYSTOLIC BLOOD PRESSURE: 133 MMHG

## 2022-09-23 LAB
ALBUMIN SERPL-MCNC: 2.7 G/DL (ref 3.5–5.2)
ALBUMIN/GLOB SERPL: 1.1 G/DL
ALP SERPL-CCNC: 143 U/L (ref 39–117)
ALT SERPL W P-5'-P-CCNC: 63 U/L (ref 1–33)
ANION GAP SERPL CALCULATED.3IONS-SCNC: 10.5 MMOL/L (ref 5–15)
AST SERPL-CCNC: 38 U/L (ref 1–32)
BILIRUB SERPL-MCNC: 0.7 MG/DL (ref 0–1.2)
BUN SERPL-MCNC: 15 MG/DL (ref 8–23)
BUN/CREAT SERPL: 19.2 (ref 7–25)
CALCIUM SPEC-SCNC: 8 MG/DL (ref 8.6–10.5)
CHLORIDE SERPL-SCNC: 102 MMOL/L (ref 98–107)
CO2 SERPL-SCNC: 19.5 MMOL/L (ref 22–29)
CREAT SERPL-MCNC: 0.78 MG/DL (ref 0.57–1)
DEPRECATED RDW RBC AUTO: 50.2 FL (ref 37–54)
EGFRCR SERPLBLD CKD-EPI 2021: 80.3 ML/MIN/1.73
ERYTHROCYTE [DISTWIDTH] IN BLOOD BY AUTOMATED COUNT: 16.2 % (ref 12.3–15.4)
GLOBULIN UR ELPH-MCNC: 2.5 GM/DL
GLUCOSE BLDC GLUCOMTR-MCNC: 164 MG/DL (ref 70–99)
GLUCOSE BLDC GLUCOMTR-MCNC: 190 MG/DL (ref 70–99)
GLUCOSE SERPL-MCNC: 234 MG/DL (ref 65–99)
HCT VFR BLD AUTO: 23.7 % (ref 34–46.6)
HGB BLD-MCNC: 8 G/DL (ref 12–15.9)
MAGNESIUM SERPL-MCNC: 1.8 MG/DL (ref 1.6–2.4)
MCH RBC QN AUTO: 29.7 PG (ref 26.6–33)
MCHC RBC AUTO-ENTMCNC: 33.8 G/DL (ref 31.5–35.7)
MCV RBC AUTO: 88.1 FL (ref 79–97)
PLATELET # BLD AUTO: 167 10*3/MM3 (ref 140–450)
PMV BLD AUTO: 9.5 FL (ref 6–12)
POTASSIUM SERPL-SCNC: 3.9 MMOL/L (ref 3.5–5.2)
PROT SERPL-MCNC: 5.2 G/DL (ref 6–8.5)
RBC # BLD AUTO: 2.69 10*6/MM3 (ref 3.77–5.28)
SODIUM SERPL-SCNC: 132 MMOL/L (ref 136–145)
WBC NRBC COR # BLD: 11.11 10*3/MM3 (ref 3.4–10.8)

## 2022-09-23 PROCEDURE — 99239 HOSP IP/OBS DSCHRG MGMT >30: CPT | Performed by: INTERNAL MEDICINE

## 2022-09-23 PROCEDURE — 80053 COMPREHEN METABOLIC PANEL: CPT | Performed by: INTERNAL MEDICINE

## 2022-09-23 PROCEDURE — 63710000001 INSULIN LISPRO (HUMAN) PER 5 UNITS: Performed by: FAMILY MEDICINE

## 2022-09-23 PROCEDURE — 82962 GLUCOSE BLOOD TEST: CPT

## 2022-09-23 PROCEDURE — 25010000002 HEPARIN (PORCINE) PER 1000 UNITS: Performed by: FAMILY MEDICINE

## 2022-09-23 PROCEDURE — 85027 COMPLETE CBC AUTOMATED: CPT | Performed by: INTERNAL MEDICINE

## 2022-09-23 PROCEDURE — 83735 ASSAY OF MAGNESIUM: CPT | Performed by: INTERNAL MEDICINE

## 2022-09-23 RX ADMIN — INSULIN LISPRO 2 UNITS: 100 INJECTION, SOLUTION INTRAVENOUS; SUBCUTANEOUS at 08:51

## 2022-09-23 RX ADMIN — HEPARIN SODIUM 5000 UNITS: 5000 INJECTION INTRAVENOUS; SUBCUTANEOUS at 05:43

## 2022-09-23 RX ADMIN — Medication 10 ML: at 08:52

## 2022-09-23 RX ADMIN — BUSPIRONE HYDROCHLORIDE 10 MG: 10 TABLET ORAL at 08:51

## 2022-09-23 RX ADMIN — PANCRELIPASE 18000 UNITS OF LIPASE: 30000; 6000; 19000 CAPSULE, DELAYED RELEASE PELLETS ORAL at 08:51

## 2022-09-23 RX ADMIN — INSULIN LISPRO 2 UNITS: 100 INJECTION, SOLUTION INTRAVENOUS; SUBCUTANEOUS at 11:54

## 2022-09-23 RX ADMIN — HEPARIN SODIUM 5000 UNITS: 5000 INJECTION INTRAVENOUS; SUBCUTANEOUS at 14:03

## 2022-09-24 LAB
BACTERIA SPEC AEROBE CULT: NORMAL

## 2022-09-24 NOTE — OUTREACH NOTE
Prep Survey    Flowsheet Row Responses   Worship facility patient discharged from? [facility]   Is LACE score < 7 ? No   Emergency Room discharge w/ pulse ox? No   Eligibility Not Eligible   What are the reasons patient is not eligible? [facility]   Does the patient have one of the following disease processes/diagnoses(primary or secondary)? Other  [Severe sepsis, Enterocolitis]   Prep survey completed? Yes          JUAN MARTÍNEZ - Registered Nurse

## 2022-09-28 ENCOUNTER — HOSPITAL ENCOUNTER (OUTPATIENT)
Dept: ONCOLOGY | Facility: HOSPITAL | Age: 72
Setting detail: INFUSION SERIES
Discharge: HOME | End: 2022-09-28

## 2022-09-28 ENCOUNTER — OFFICE VISIT (OUTPATIENT)
Dept: ONCOLOGY | Facility: HOSPITAL | Age: 72
End: 2022-09-28

## 2022-09-28 VITALS
HEART RATE: 95 BPM | SYSTOLIC BLOOD PRESSURE: 113 MMHG | DIASTOLIC BLOOD PRESSURE: 71 MMHG | RESPIRATION RATE: 18 BRPM | WEIGHT: 157.63 LBS | TEMPERATURE: 98.8 F | BODY MASS INDEX: 26.23 KG/M2 | OXYGEN SATURATION: 99 %

## 2022-09-28 DIAGNOSIS — C25.0 MALIGNANT NEOPLASM OF HEAD OF PANCREAS: ICD-10-CM

## 2022-09-28 DIAGNOSIS — Z85.07 HISTORY OF PANCREATIC CANCER: Primary | ICD-10-CM

## 2022-09-28 DIAGNOSIS — Z45.2 ENCOUNTER FOR ADJUSTMENT OR MANAGEMENT OF VASCULAR ACCESS DEVICE: ICD-10-CM

## 2022-09-28 PROBLEM — K74.60 HEPATIC CIRRHOSIS (HCC): Status: ACTIVE | Noted: 2022-09-28

## 2022-09-28 LAB
ALBUMIN SERPL-MCNC: 3.1 G/DL (ref 3.5–5.2)
ALBUMIN/GLOB SERPL: 1.2 G/DL
ALP SERPL-CCNC: 171 U/L (ref 39–117)
ALT SERPL W P-5'-P-CCNC: 64 U/L (ref 1–33)
ANION GAP SERPL CALCULATED.3IONS-SCNC: 12.7 MMOL/L (ref 5–15)
AST SERPL-CCNC: 71 U/L (ref 1–32)
BASOPHILS # BLD AUTO: 0.05 10*3/MM3 (ref 0–0.2)
BASOPHILS NFR BLD AUTO: 0.5 % (ref 0–1.5)
BILIRUB SERPL-MCNC: 1 MG/DL (ref 0–1.2)
BUN SERPL-MCNC: 14 MG/DL (ref 8–23)
BUN/CREAT SERPL: 19.2 (ref 7–25)
CALCIUM SPEC-SCNC: 7.9 MG/DL (ref 8.6–10.5)
CANCER AG19-9 SERPL-ACNC: 381 U/ML
CHLORIDE SERPL-SCNC: 101 MMOL/L (ref 98–107)
CO2 SERPL-SCNC: 20.3 MMOL/L (ref 22–29)
CREAT SERPL-MCNC: 0.73 MG/DL (ref 0.57–1)
DEPRECATED RDW RBC AUTO: 57.1 FL (ref 37–54)
EGFRCR SERPLBLD CKD-EPI 2021: 87 ML/MIN/1.73
EOSINOPHIL # BLD AUTO: 0.15 10*3/MM3 (ref 0–0.4)
EOSINOPHIL NFR BLD AUTO: 1.4 % (ref 0.3–6.2)
ERYTHROCYTE [DISTWIDTH] IN BLOOD BY AUTOMATED COUNT: 17.8 % (ref 12.3–15.4)
GLOBULIN UR ELPH-MCNC: 2.6 GM/DL
GLUCOSE SERPL-MCNC: 211 MG/DL (ref 65–99)
HCT VFR BLD AUTO: 26.7 % (ref 34–46.6)
HGB BLD-MCNC: 9 G/DL (ref 12–15.9)
IMM GRANULOCYTES # BLD AUTO: 0.31 10*3/MM3 (ref 0–0.05)
IMM GRANULOCYTES NFR BLD AUTO: 2.9 % (ref 0–0.5)
LYMPHOCYTES # BLD AUTO: 0.99 10*3/MM3 (ref 0.7–3.1)
LYMPHOCYTES NFR BLD AUTO: 9.4 % (ref 19.6–45.3)
MCH RBC QN AUTO: 30.6 PG (ref 26.6–33)
MCHC RBC AUTO-ENTMCNC: 33.7 G/DL (ref 31.5–35.7)
MCV RBC AUTO: 90.8 FL (ref 79–97)
MONOCYTES # BLD AUTO: 0.7 10*3/MM3 (ref 0.1–0.9)
MONOCYTES NFR BLD AUTO: 6.6 % (ref 5–12)
NEUTROPHILS NFR BLD AUTO: 79.2 % (ref 42.7–76)
NEUTROPHILS NFR BLD AUTO: 8.35 10*3/MM3 (ref 1.7–7)
NRBC BLD AUTO-RTO: 0 /100 WBC (ref 0–0.2)
PLATELET # BLD AUTO: 131 10*3/MM3 (ref 140–450)
PMV BLD AUTO: 9.5 FL (ref 6–12)
POTASSIUM SERPL-SCNC: 4.3 MMOL/L (ref 3.5–5.2)
PROT SERPL-MCNC: 5.7 G/DL (ref 6–8.5)
RBC # BLD AUTO: 2.94 10*6/MM3 (ref 3.77–5.28)
SODIUM SERPL-SCNC: 134 MMOL/L (ref 136–145)
WBC NRBC COR # BLD: 10.55 10*3/MM3 (ref 3.4–10.8)

## 2022-09-28 PROCEDURE — G0463 HOSPITAL OUTPT CLINIC VISIT: HCPCS | Performed by: INTERNAL MEDICINE

## 2022-09-28 PROCEDURE — 85025 COMPLETE CBC W/AUTO DIFF WBC: CPT | Performed by: INTERNAL MEDICINE

## 2022-09-28 PROCEDURE — 80053 COMPREHEN METABOLIC PANEL: CPT | Performed by: INTERNAL MEDICINE

## 2022-09-28 PROCEDURE — 86301 IMMUNOASSAY TUMOR CA 19-9: CPT | Performed by: INTERNAL MEDICINE

## 2022-09-28 PROCEDURE — 36591 DRAW BLOOD OFF VENOUS DEVICE: CPT

## 2022-09-28 PROCEDURE — 99214 OFFICE O/P EST MOD 30 MIN: CPT | Performed by: INTERNAL MEDICINE

## 2022-09-28 RX ORDER — FUROSEMIDE 20 MG/1
TABLET ORAL
COMMUNITY
Start: 2022-09-25 | End: 2022-10-02

## 2022-09-28 RX ORDER — SODIUM CHLORIDE 0.9 % (FLUSH) 0.9 %
20 SYRINGE (ML) INJECTION AS NEEDED
Status: CANCELLED | OUTPATIENT
Start: 2022-09-28

## 2022-09-28 RX ORDER — HEPARIN SODIUM (PORCINE) LOCK FLUSH IV SOLN 100 UNIT/ML 100 UNIT/ML
500 SOLUTION INTRAVENOUS AS NEEDED
Status: CANCELLED | OUTPATIENT
Start: 2022-09-28

## 2022-09-28 RX ORDER — INSULIN ASPART 100 [IU]/ML
INJECTION, SOLUTION INTRAVENOUS; SUBCUTANEOUS
COMMUNITY
Start: 2022-09-26

## 2022-09-28 RX ORDER — SODIUM CHLORIDE 0.9 % (FLUSH) 0.9 %
20 SYRINGE (ML) INJECTION AS NEEDED
Status: DISCONTINUED | OUTPATIENT
Start: 2022-09-28 | End: 2022-09-29 | Stop reason: HOSPADM

## 2022-09-28 RX ORDER — LISINOPRIL 20 MG/1
20 TABLET ORAL
COMMUNITY
Start: 2022-09-25

## 2022-09-28 RX ORDER — CYCLOBENZAPRINE HCL 10 MG
10 TABLET ORAL 3 TIMES DAILY
COMMUNITY
Start: 2022-07-11

## 2022-09-28 RX ORDER — HYDROCODONE BITARTRATE AND ACETAMINOPHEN 5; 325 MG/1; MG/1
1 TABLET ORAL
COMMUNITY
Start: 2022-06-27

## 2022-09-28 RX ORDER — FAMOTIDINE 20 MG/1
TABLET, FILM COATED ORAL
COMMUNITY
Start: 2022-09-24

## 2022-09-28 RX ORDER — HEPARIN SODIUM (PORCINE) LOCK FLUSH IV SOLN 100 UNIT/ML 100 UNIT/ML
500 SOLUTION INTRAVENOUS AS NEEDED
Status: DISCONTINUED | OUTPATIENT
Start: 2022-09-28 | End: 2022-09-29 | Stop reason: HOSPADM

## 2022-09-28 RX ADMIN — Medication 20 ML: at 16:35

## 2022-10-03 ENCOUNTER — TRANSCRIBE ORDERS (OUTPATIENT)
Dept: DIABETES SERVICES | Facility: HOSPITAL | Age: 72
End: 2022-10-03

## 2022-10-03 DIAGNOSIS — Z79.4 TYPE 2 DIABETES MELLITUS WITH HYPERGLYCEMIA, WITH LONG-TERM CURRENT USE OF INSULIN: Primary | ICD-10-CM

## 2022-10-03 DIAGNOSIS — E11.65 TYPE 2 DIABETES MELLITUS WITH HYPERGLYCEMIA, WITH LONG-TERM CURRENT USE OF INSULIN: Primary | ICD-10-CM

## 2022-10-03 PROBLEM — R11.2 NAUSEA & VOMITING: Status: ACTIVE | Noted: 2022-10-03

## 2022-10-03 PROBLEM — R63.4 ABNORMAL WEIGHT LOSS: Status: ACTIVE | Noted: 2022-10-03

## 2022-10-05 ENCOUNTER — OFFICE VISIT (OUTPATIENT)
Dept: ONCOLOGY | Facility: HOSPITAL | Age: 72
End: 2022-10-05

## 2022-10-05 ENCOUNTER — HOSPITAL ENCOUNTER (OUTPATIENT)
Dept: ONCOLOGY | Facility: HOSPITAL | Age: 72
Setting detail: INFUSION SERIES
Discharge: HOME | End: 2022-10-05

## 2022-10-05 VITALS
TEMPERATURE: 97.8 F | SYSTOLIC BLOOD PRESSURE: 118 MMHG | HEIGHT: 65 IN | WEIGHT: 156.97 LBS | DIASTOLIC BLOOD PRESSURE: 56 MMHG | HEART RATE: 91 BPM | BODY MASS INDEX: 26.15 KG/M2 | RESPIRATION RATE: 20 BRPM | OXYGEN SATURATION: 100 %

## 2022-10-05 VITALS
HEIGHT: 65 IN | HEART RATE: 91 BPM | TEMPERATURE: 97.8 F | SYSTOLIC BLOOD PRESSURE: 118 MMHG | WEIGHT: 156.97 LBS | DIASTOLIC BLOOD PRESSURE: 56 MMHG | RESPIRATION RATE: 20 BRPM | BODY MASS INDEX: 26.15 KG/M2 | OXYGEN SATURATION: 100 %

## 2022-10-05 DIAGNOSIS — C25.0 MALIGNANT NEOPLASM OF HEAD OF PANCREAS: Primary | ICD-10-CM

## 2022-10-05 DIAGNOSIS — Z45.2 ENCOUNTER FOR ADJUSTMENT OR MANAGEMENT OF VASCULAR ACCESS DEVICE: ICD-10-CM

## 2022-10-05 DIAGNOSIS — E86.0 DEHYDRATION: ICD-10-CM

## 2022-10-05 LAB
ALBUMIN SERPL-MCNC: 2.96 G/DL (ref 3.5–5.2)
ALBUMIN/GLOB SERPL: 1.2 G/DL
ALP SERPL-CCNC: 160 U/L (ref 39–117)
ALT SERPL W P-5'-P-CCNC: 30 U/L (ref 1–33)
AMMONIA BLD-SCNC: 48 UMOL/L (ref 11–51)
ANION GAP SERPL CALCULATED.3IONS-SCNC: 6.2 MMOL/L (ref 5–15)
AST SERPL-CCNC: 46 U/L (ref 1–32)
BASOPHILS # BLD AUTO: 0.02 10*3/MM3 (ref 0–0.2)
BASOPHILS NFR BLD AUTO: 0.3 % (ref 0–1.5)
BILIRUB SERPL-MCNC: 0.9 MG/DL (ref 0–1.2)
BUN SERPL-MCNC: 14 MG/DL (ref 8–23)
BUN/CREAT SERPL: 15.2 (ref 7–25)
CALCIUM SPEC-SCNC: 8.2 MG/DL (ref 8.6–10.5)
CHLORIDE SERPL-SCNC: 98 MMOL/L (ref 98–107)
CO2 SERPL-SCNC: 25.8 MMOL/L (ref 22–29)
CREAT SERPL-MCNC: 0.92 MG/DL (ref 0.57–1)
DEPRECATED RDW RBC AUTO: 61 FL (ref 37–54)
EGFRCR SERPLBLD CKD-EPI 2021: 65.9 ML/MIN/1.73
EOSINOPHIL # BLD AUTO: 0.16 10*3/MM3 (ref 0–0.4)
EOSINOPHIL NFR BLD AUTO: 2.2 % (ref 0.3–6.2)
ERYTHROCYTE [DISTWIDTH] IN BLOOD BY AUTOMATED COUNT: 18.3 % (ref 12.3–15.4)
GLOBULIN UR ELPH-MCNC: 2.5 GM/DL
GLUCOSE SERPL-MCNC: 210 MG/DL (ref 65–99)
HCT VFR BLD AUTO: 26.5 % (ref 34–46.6)
HGB BLD-MCNC: 8.5 G/DL (ref 12–15.9)
IMM GRANULOCYTES # BLD AUTO: 0.05 10*3/MM3 (ref 0–0.05)
IMM GRANULOCYTES NFR BLD AUTO: 0.7 % (ref 0–0.5)
LYMPHOCYTES # BLD AUTO: 1.1 10*3/MM3 (ref 0.7–3.1)
LYMPHOCYTES NFR BLD AUTO: 15.4 % (ref 19.6–45.3)
MCH RBC QN AUTO: 29.7 PG (ref 26.6–33)
MCHC RBC AUTO-ENTMCNC: 32.1 G/DL (ref 31.5–35.7)
MCV RBC AUTO: 92.7 FL (ref 79–97)
MONOCYTES # BLD AUTO: 0.58 10*3/MM3 (ref 0.1–0.9)
MONOCYTES NFR BLD AUTO: 8.1 % (ref 5–12)
NEUTROPHILS NFR BLD AUTO: 5.24 10*3/MM3 (ref 1.7–7)
NEUTROPHILS NFR BLD AUTO: 73.3 % (ref 42.7–76)
PLATELET # BLD AUTO: 173 10*3/MM3 (ref 140–450)
PMV BLD AUTO: 9.4 FL (ref 6–12)
POTASSIUM SERPL-SCNC: 4.1 MMOL/L (ref 3.5–5.2)
PROT SERPL-MCNC: 5.5 G/DL (ref 6–8.5)
RBC # BLD AUTO: 2.86 10*6/MM3 (ref 3.77–5.28)
SODIUM SERPL-SCNC: 130 MMOL/L (ref 136–145)
WBC NRBC COR # BLD: 7.15 10*3/MM3 (ref 3.4–10.8)

## 2022-10-05 PROCEDURE — 25010000002 DEXAMETHASONE SODIUM PHOSPHATE 120 MG/30ML SOLUTION: Performed by: INTERNAL MEDICINE

## 2022-10-05 PROCEDURE — 82140 ASSAY OF AMMONIA: CPT | Performed by: INTERNAL MEDICINE

## 2022-10-05 PROCEDURE — 25010000002 HEPARIN LOCK FLUSH PER 10 UNITS: Performed by: INTERNAL MEDICINE

## 2022-10-05 PROCEDURE — 96360 HYDRATION IV INFUSION INIT: CPT

## 2022-10-05 PROCEDURE — 96375 TX/PRO/DX INJ NEW DRUG ADDON: CPT

## 2022-10-05 PROCEDURE — 25010000002 GEMCITABINE 1 GM/26.3ML SOLUTION 26.3 ML VIAL: Performed by: INTERNAL MEDICINE

## 2022-10-05 PROCEDURE — 80053 COMPREHEN METABOLIC PANEL: CPT | Performed by: INTERNAL MEDICINE

## 2022-10-05 PROCEDURE — 85025 COMPLETE CBC W/AUTO DIFF WBC: CPT | Performed by: INTERNAL MEDICINE

## 2022-10-05 PROCEDURE — 96361 HYDRATE IV INFUSION ADD-ON: CPT

## 2022-10-05 PROCEDURE — 96413 CHEMO IV INFUSION 1 HR: CPT

## 2022-10-05 PROCEDURE — 99215 OFFICE O/P EST HI 40 MIN: CPT | Performed by: INTERNAL MEDICINE

## 2022-10-05 RX ORDER — HEPARIN SODIUM (PORCINE) LOCK FLUSH IV SOLN 100 UNIT/ML 100 UNIT/ML
500 SOLUTION INTRAVENOUS AS NEEDED
Status: DISCONTINUED | OUTPATIENT
Start: 2022-10-05 | End: 2022-10-06 | Stop reason: HOSPADM

## 2022-10-05 RX ORDER — HEPARIN SODIUM (PORCINE) LOCK FLUSH IV SOLN 100 UNIT/ML 100 UNIT/ML
500 SOLUTION INTRAVENOUS AS NEEDED
OUTPATIENT
Start: 2022-10-05

## 2022-10-05 RX ORDER — SODIUM CHLORIDE 9 MG/ML
250 INJECTION, SOLUTION INTRAVENOUS ONCE
Status: COMPLETED | OUTPATIENT
Start: 2022-10-05 | End: 2022-10-05

## 2022-10-05 RX ORDER — SODIUM CHLORIDE 0.9 % (FLUSH) 0.9 %
20 SYRINGE (ML) INJECTION AS NEEDED
OUTPATIENT
Start: 2022-10-05

## 2022-10-05 RX ORDER — SODIUM CHLORIDE 0.9 % (FLUSH) 0.9 %
20 SYRINGE (ML) INJECTION AS NEEDED
Status: DISCONTINUED | OUTPATIENT
Start: 2022-10-05 | End: 2022-10-06 | Stop reason: HOSPADM

## 2022-10-05 RX ADMIN — SODIUM CHLORIDE 1000 ML: 9 INJECTION, SOLUTION INTRAVENOUS at 13:00

## 2022-10-05 RX ADMIN — SODIUM CHLORIDE 250 ML: 9 INJECTION, SOLUTION INTRAVENOUS at 11:53

## 2022-10-05 RX ADMIN — DEXAMETHASONE SODIUM PHOSPHATE 12 MG: 4 INJECTION, SOLUTION INTRA-ARTICULAR; INTRALESIONAL; INTRAMUSCULAR; INTRAVENOUS; SOFT TISSUE at 11:54

## 2022-10-05 RX ADMIN — GEMCITABINE HYDROCHLORIDE 1350 MG: 1 INJECTION, SOLUTION INTRAVENOUS at 12:29

## 2022-10-05 RX ADMIN — Medication 20 ML: at 13:51

## 2022-10-05 RX ADMIN — HEPARIN 500 UNITS: 100 SYRINGE at 13:51

## 2022-10-05 NOTE — PROGRESS NOTES
Patient  Yvrose Duncan    Location  [facility] HEMATOLOGY & ONCOLOGY    Chief Complaint  Pancreatic Cancer    Referring Provider: Brock Quach, *  PCP: Brock Quach PA    Subjective          Oncology/Hematology History Overview Note   Ms. Duncan is a 70-year-old female with pancreatic cancer.     Presented with abdomnial pain and jaundice. 1/21/2020 patient underwent EUS at [facility] by Dr. Acevedo, pancreatic head mass, 2.8 x 2.7 cm in size as well as a common bile duct stricture.  She had a stent placed in the common bile duct. Biopsy was negative. The procedure was complicated by pancreatitis, mesenteric vein thrombosis and then E coli sepsis.       Then on 3/5/2020 she had worsening abdominal pain and was readmitted to [facility] with ascending cholangitis secondary to a blocked stent.  On 3/6/2020 the patient underwent ERCP with stent exchange by Dr. Clancy. CT abdomen/pelvis showed cystic areas within the head of the pancreas that may relate to pancreatitis or possibly branch type IPMN.     Referred to Dr. Casillas at the [facility].  She was admitted for feeding tube placement and nutritional optimization and pain control.  Whipple procedure on 4/15/2020.  Pathology revealed invasive moderately to poorly differentiated adenocarcinoma in the pancreas as well as an 5 of 21 lymph nodes.  She also had perineural invasion present.  Pathologic stage was pT2pN2.      Cycle 1 of adjuvant chemotherapy with gemcitabine and Abraxane on 5/22/2020.  Cycle 1 day 8 on 5/29/2020.  Cycle 2-day 1 on 6/15/2020, started doing treatment on days 1 and 15 of a q. 28-day cycle, C2D15 on 6/29/20  Cycle 3-day 1 on 7/13/2020  Cycle 4-day 1 on 8/10/2020    Iron deficiency anemia: Patient was treated with Injectafer on June 8 and 15.     on 3/7/20 was elevated at 273 and improved to 27.3 on 12/17/20.    CT scan on 12/16/20: No evidence of metastatic disease in the chest, abdomen, or pelvis.  " Postoperative changes from Whipple procedure are noted.  CT scan on 3/12/2021: Previous Whipple procedure. No definite evidence for active malignancy in the chest, abdomen, or pelvis.     Malignant neoplasm of head of pancreas (HCC)   1/21/2020 Initial Diagnosis    Malignant neoplasm of head of pancreas (CMS/HCC)     4/15/2020 Surgery    Surgery       Procedure:  Whipple by Dr. Casillas at [facility]: Pathology revealed invasive moderately to poorly differentiated adenocarcinoma in the pancreas as well as an 5 of 21 lymph nodes.  She also had perineural invasion present.  Pathologic stage was pT2pN2.         5/22/2020 - 8/10/2020 Chemotherapy    Gemcitabine and Abraxane x 4 cycles     8/25/2022 -  Chemotherapy    OP PANCREATIC Gemcitabine         HPI  She comes in again with her daughter for toxicity check prior to the next cycle of chemotherapy.  The last couple of weeks her chemotherapy has been on hold.  She still has diarrhea and nausea.  Her daughter says she is not getting any stronger and if anything probably is weakening more.  She continues to have difficulty with clear thoughts.  Now she is experiencing \"horrible reflux\".  She is also having significant pain on both sides of her abdomen and sometimes in the middle/epigastric area.    Review of Systems   Constitutional: Positive for fatigue. Negative for appetite change, diaphoresis, fever, unexpected weight gain and unexpected weight loss.   HENT: Negative for hearing loss, sore throat and voice change.    Eyes: Negative for blurred vision, double vision, pain, redness and visual disturbance.   Respiratory: Positive for cough. Negative for shortness of breath and wheezing.    Cardiovascular: Negative for chest pain, palpitations and leg swelling.   Gastrointestinal: Positive for abdominal pain (no pain today, last night 10/04/2022 9/10).   Endocrine: Negative for cold intolerance, heat intolerance, polydipsia and polyuria.   Genitourinary: " Negative for decreased urine volume, difficulty urinating, frequency and urinary incontinence.   Musculoskeletal: Negative for arthralgias, back pain, joint swelling and myalgias.   Skin: Negative for color change, rash, skin lesions and wound.   Neurological: Negative for dizziness, seizures, numbness and headache.   Hematological: Negative for adenopathy. Does not bruise/bleed easily.   Psychiatric/Behavioral: Negative for depressed mood. The patient is not nervous/anxious.        Past Medical History:   Diagnosis Date   • Diabetes (HCC)    • Essential hypertension    • Gallstones    • Heartburn    • History of pancreatic cancer    • No family history of colorectal cancer    • Pancreatic cancer (HCC)    • Sleep apnea      Past Surgical History:   Procedure Laterality Date   • APPENDECTOMY     • COLONOSCOPY     • ERCP     • FOOT SURGERY     • GALLBLADDER SURGERY     • HERNIA REPAIR     • OOPHORECTOMY     • UPPER GASTROINTESTINAL ENDOSCOPY     • WHIPPLE PROCEDURE       Social History     Socioeconomic History   • Marital status:    Tobacco Use   • Smoking status: Never Smoker   • Smokeless tobacco: Never Used   Vaping Use   • Vaping Use: Never used   Substance and Sexual Activity   • Alcohol use: Never   • Drug use: Never   • Sexual activity: Not Currently     Family History   Problem Relation Age of Onset   • Heart attack Mother    • Diabetes Mother    • Heart disease Mother    • Heart attack Father    • Colon cancer Neg Hx        Objective   Physical Exam  General: Alert, cooperative, no acute distress but frail and weak  Eyes: Anicteric sclera, PERRLA  Respiratory: normal respiratory effort  Cardiovascular: no lower extremity edema  Skin: Normal tone, no rash, no lesions  Psychiatric: Appropriate affect, intact judgment  Neurologic: No focal sensory or motor deficits, normal cognition most of the time.  Occasionally her thinking is slow but she has normal conversation.   Abdomen: Distended abdomen with  tenderness in the midline and both flanks  Musculoskeletal: Reduced muscle strength and tone, in a wheelchair  Extremities: No clubbing, cyanosis, or deformities    There were no vitals filed for this visit.            PHQ-9 Total Score:         Result Review :   The following data was reviewed by: Flavia Vásquez MA on 10/05/2022:  Lab Results   Component Value Date    HGB 9.0 (L) 09/28/2022    HCT 26.7 (L) 09/28/2022    MCV 90.8 09/28/2022     (L) 09/28/2022    WBC 10.55 09/28/2022    NEUTROABS 8.35 (H) 09/28/2022    LYMPHSABS 0.99 09/28/2022    MONOSABS 0.70 09/28/2022    EOSABS 0.15 09/28/2022    BASOSABS 0.05 09/28/2022     Lab Results   Component Value Date    GLUCOSE 211 (H) 09/28/2022    BUN 14 09/28/2022    CREATININE 0.73 09/28/2022     (L) 09/28/2022    K 4.3 09/28/2022     09/28/2022    CO2 20.3 (L) 09/28/2022    CALCIUM 7.9 (L) 09/28/2022    PROTEINTOT 5.7 (L) 09/28/2022    ALBUMIN 3.10 (L) 09/28/2022    BILITOT 1.0 09/28/2022    ALKPHOS 171 (H) 09/28/2022    AST 71 (H) 09/28/2022    ALT 64 (H) 09/28/2022          Assessment and Plan    There are no diagnoses linked to this encounter.      Recurrent Metastatic Pancreatic Cancer: The patient continues to have severe, life-threatening complications from her disease including nausea and vomiting, failure to thrive and malnutrition.  She will start C2 of chemotherapy today. I believe her symptoms are due mostly to disease and not to treatment toxicity. I have consulted palliative care for help in management of symptoms. The patient wishes to proceed with treatment.  She knows that her health is in a very precarious situation that her cancer is likely complicated by underlying cirrhosis and related complications.  She will return to clinic in 2 weeks for toxicity check prior to the next dose of chemotherapy.    Nausea and vomiting: She can continue Ativan, Zofran and olanzapine.      Cancer-related pain: Abdominal pain and back pain are  directly related to the malignancy. She is on 15 mg MS Contin every 12 hours and will switch back to hydrocodone as needed.  Oxycodone makes her more delirious.  I will also refer her to radiation oncology to discuss palliative treatment.    Delirium: The patient's daughter is very worried about her lack of clear thinking.  I believe this is related to the multitude of serious illnesses.  I expect that her clarity of thinking will wax and wane.    Patient was given instructions and counseling regarding her condition or for health maintenance advice. Please see specific information pulled into the AVS if appropriate.

## 2022-10-07 ENCOUNTER — TELEPHONE (OUTPATIENT)
Dept: ONCOLOGY | Facility: HOSPITAL | Age: 72
End: 2022-10-07

## 2022-10-07 NOTE — TELEPHONE ENCOUNTER
[facility]. OSW provided [facility] with patient's emergency contact's phone number as the point of contact.

## 2022-10-07 NOTE — TELEPHONE ENCOUNTER
Jodie called and states that the pt had TX on Wednsday and started having wheezing, cough, and swollen eyes on Thursday. Jodie is trying to reach Palliative Care for a Hospice referral but has not had success reaching Palliative Care. This nurse contacted Dr Acharya and she is aware of the pt's s/s.

## 2022-10-11 NOTE — TELEPHONE ENCOUNTER
Update 10/11: Confirmed with [facility] that pt admitted with hospice care on 10/7/22. Notified staff.

## 2023-10-30 NOTE — ED TRIAGE NOTES
Patient states she was here yesterday for abdominal pain and nausea and vomiting, after she was discharged here she went to [facility] where she states they was going to admit her but she declined. Patient states they was going to admit here for her decreased appetite and hx of WHIPPLE procedure. patient states after she declined admission she went home where family states her pain has increased and weakness. Patient states she still does not have a appetite.    No